# Patient Record
Sex: FEMALE | Race: BLACK OR AFRICAN AMERICAN | NOT HISPANIC OR LATINO | Employment: OTHER | URBAN - METROPOLITAN AREA
[De-identification: names, ages, dates, MRNs, and addresses within clinical notes are randomized per-mention and may not be internally consistent; named-entity substitution may affect disease eponyms.]

---

## 2017-01-01 ENCOUNTER — INFUSION (OUTPATIENT)
Dept: ONCOLOGY | Facility: HOSPITAL | Age: 77
End: 2017-01-01

## 2017-01-01 ENCOUNTER — TELEPHONE (OUTPATIENT)
Dept: ONCOLOGY | Facility: CLINIC | Age: 77
End: 2017-01-01

## 2017-01-01 ENCOUNTER — HOSPITAL ENCOUNTER (OUTPATIENT)
Dept: CARDIOLOGY | Facility: HOSPITAL | Age: 77
Discharge: HOME | End: 2017-08-09
Admitting: NURSE PRACTITIONER

## 2017-01-01 ENCOUNTER — DOCUMENTATION (OUTPATIENT)
Dept: NUTRITION | Facility: HOSPITAL | Age: 77
End: 2017-01-01

## 2017-01-01 ENCOUNTER — OFFICE VISIT (OUTPATIENT)
Dept: ONCOLOGY | Facility: CLINIC | Age: 77
End: 2017-01-01

## 2017-01-01 ENCOUNTER — HOSPITAL ENCOUNTER (OUTPATIENT)
Dept: CT IMAGING | Facility: HOSPITAL | Age: 77
Discharge: HOME | End: 2017-10-20
Attending: INTERNAL MEDICINE | Admitting: INTERNAL MEDICINE

## 2017-01-01 ENCOUNTER — APPOINTMENT (OUTPATIENT)
Dept: ONCOLOGY | Facility: HOSPITAL | Age: 77
End: 2017-01-01

## 2017-01-01 ENCOUNTER — HOSPITAL ENCOUNTER (EMERGENCY)
Facility: HOSPITAL | Age: 77
Discharge: HOME | End: 2017-10-15
Attending: EMERGENCY MEDICINE | Admitting: EMERGENCY MEDICINE

## 2017-01-01 ENCOUNTER — HOSPITAL ENCOUNTER (OUTPATIENT)
Dept: CT IMAGING | Facility: HOSPITAL | Age: 77
Discharge: HOME | End: 2017-05-26
Attending: INTERNAL MEDICINE | Admitting: INTERNAL MEDICINE

## 2017-01-01 ENCOUNTER — HOSPITAL ENCOUNTER (OUTPATIENT)
Dept: CT IMAGING | Facility: HOSPITAL | Age: 77
Discharge: HOME | End: 2017-08-09

## 2017-01-01 ENCOUNTER — APPOINTMENT (OUTPATIENT)
Dept: GENERAL RADIOLOGY | Facility: HOSPITAL | Age: 77
End: 2017-01-01

## 2017-01-01 ENCOUNTER — HOSPITAL ENCOUNTER (OUTPATIENT)
Facility: HOSPITAL | Age: 77
Setting detail: OBSERVATION
Discharge: HOME | End: 2017-07-07
Attending: EMERGENCY MEDICINE | Admitting: INTERNAL MEDICINE

## 2017-01-01 VITALS
WEIGHT: 139 LBS | DIASTOLIC BLOOD PRESSURE: 72 MMHG | HEIGHT: 64 IN | RESPIRATION RATE: 16 BRPM | HEART RATE: 97 BPM | SYSTOLIC BLOOD PRESSURE: 145 MMHG | TEMPERATURE: 97.9 F | BODY MASS INDEX: 23.73 KG/M2

## 2017-01-01 VITALS
RESPIRATION RATE: 18 BRPM | WEIGHT: 171 LBS | TEMPERATURE: 98.8 F | SYSTOLIC BLOOD PRESSURE: 134 MMHG | DIASTOLIC BLOOD PRESSURE: 60 MMHG | BODY MASS INDEX: 29.19 KG/M2 | HEART RATE: 102 BPM | HEIGHT: 64 IN

## 2017-01-01 VITALS
WEIGHT: 153 LBS | TEMPERATURE: 98.2 F | BODY MASS INDEX: 26.12 KG/M2 | DIASTOLIC BLOOD PRESSURE: 63 MMHG | SYSTOLIC BLOOD PRESSURE: 117 MMHG | HEIGHT: 64 IN | RESPIRATION RATE: 18 BRPM | HEART RATE: 106 BPM

## 2017-01-01 VITALS
WEIGHT: 172 LBS | SYSTOLIC BLOOD PRESSURE: 129 MMHG | DIASTOLIC BLOOD PRESSURE: 61 MMHG | TEMPERATURE: 98.2 F | HEART RATE: 112 BPM | BODY MASS INDEX: 29.37 KG/M2 | RESPIRATION RATE: 18 BRPM | HEIGHT: 64 IN

## 2017-01-01 VITALS
TEMPERATURE: 98.1 F | SYSTOLIC BLOOD PRESSURE: 144 MMHG | WEIGHT: 165 LBS | HEIGHT: 64 IN | HEART RATE: 102 BPM | RESPIRATION RATE: 18 BRPM | DIASTOLIC BLOOD PRESSURE: 75 MMHG | BODY MASS INDEX: 28.17 KG/M2

## 2017-01-01 VITALS
BODY MASS INDEX: 24.75 KG/M2 | SYSTOLIC BLOOD PRESSURE: 112 MMHG | RESPIRATION RATE: 16 BRPM | WEIGHT: 145 LBS | HEIGHT: 64 IN | DIASTOLIC BLOOD PRESSURE: 60 MMHG | HEART RATE: 108 BPM | TEMPERATURE: 98 F

## 2017-01-01 VITALS
BODY MASS INDEX: 28.32 KG/M2 | TEMPERATURE: 98.3 F | DIASTOLIC BLOOD PRESSURE: 70 MMHG | WEIGHT: 165 LBS | SYSTOLIC BLOOD PRESSURE: 149 MMHG | RESPIRATION RATE: 19 BRPM | HEART RATE: 88 BPM

## 2017-01-01 VITALS
WEIGHT: 165 LBS | SYSTOLIC BLOOD PRESSURE: 148 MMHG | BODY MASS INDEX: 28.17 KG/M2 | TEMPERATURE: 97.6 F | RESPIRATION RATE: 18 BRPM | HEART RATE: 105 BPM | DIASTOLIC BLOOD PRESSURE: 71 MMHG | HEIGHT: 64 IN

## 2017-01-01 VITALS
RESPIRATION RATE: 18 BRPM | WEIGHT: 173 LBS | TEMPERATURE: 97.8 F | HEART RATE: 110 BPM | SYSTOLIC BLOOD PRESSURE: 125 MMHG | BODY MASS INDEX: 29.53 KG/M2 | HEIGHT: 64 IN | DIASTOLIC BLOOD PRESSURE: 59 MMHG

## 2017-01-01 VITALS
DIASTOLIC BLOOD PRESSURE: 63 MMHG | BODY MASS INDEX: 28 KG/M2 | TEMPERATURE: 97.7 F | HEART RATE: 100 BPM | RESPIRATION RATE: 18 BRPM | WEIGHT: 164 LBS | SYSTOLIC BLOOD PRESSURE: 129 MMHG | HEIGHT: 64 IN

## 2017-01-01 VITALS
HEIGHT: 64 IN | WEIGHT: 146.8 LBS | TEMPERATURE: 98.7 F | DIASTOLIC BLOOD PRESSURE: 65 MMHG | RESPIRATION RATE: 16 BRPM | HEART RATE: 98 BPM | SYSTOLIC BLOOD PRESSURE: 115 MMHG | BODY MASS INDEX: 25.06 KG/M2

## 2017-01-01 VITALS
TEMPERATURE: 97.2 F | BODY MASS INDEX: 29.19 KG/M2 | RESPIRATION RATE: 16 BRPM | SYSTOLIC BLOOD PRESSURE: 135 MMHG | WEIGHT: 171 LBS | HEIGHT: 64 IN | HEART RATE: 104 BPM | DIASTOLIC BLOOD PRESSURE: 57 MMHG

## 2017-01-01 VITALS
SYSTOLIC BLOOD PRESSURE: 144 MMHG | RESPIRATION RATE: 16 BRPM | WEIGHT: 162 LBS | TEMPERATURE: 98.2 F | HEIGHT: 64 IN | DIASTOLIC BLOOD PRESSURE: 68 MMHG | BODY MASS INDEX: 27.66 KG/M2 | HEART RATE: 105 BPM

## 2017-01-01 VITALS
WEIGHT: 140 LBS | HEART RATE: 118 BPM | BODY MASS INDEX: 25.27 KG/M2 | RESPIRATION RATE: 18 BRPM | HEIGHT: 64 IN | HEART RATE: 107 BPM | RESPIRATION RATE: 22 BRPM | SYSTOLIC BLOOD PRESSURE: 133 MMHG | HEIGHT: 64 IN | BODY MASS INDEX: 23.9 KG/M2 | TEMPERATURE: 97.3 F | WEIGHT: 148 LBS | DIASTOLIC BLOOD PRESSURE: 83 MMHG | OXYGEN SATURATION: 95 % | DIASTOLIC BLOOD PRESSURE: 68 MMHG | SYSTOLIC BLOOD PRESSURE: 177 MMHG | TEMPERATURE: 99.1 F

## 2017-01-01 VITALS
DIASTOLIC BLOOD PRESSURE: 69 MMHG | SYSTOLIC BLOOD PRESSURE: 151 MMHG | RESPIRATION RATE: 20 BRPM | WEIGHT: 172 LBS | TEMPERATURE: 97.7 F | HEIGHT: 64 IN | BODY MASS INDEX: 29.37 KG/M2 | HEART RATE: 93 BPM

## 2017-01-01 VITALS
WEIGHT: 136 LBS | BODY MASS INDEX: 23.22 KG/M2 | DIASTOLIC BLOOD PRESSURE: 86 MMHG | RESPIRATION RATE: 16 BRPM | HEIGHT: 64 IN | SYSTOLIC BLOOD PRESSURE: 181 MMHG | HEART RATE: 92 BPM | TEMPERATURE: 97.6 F

## 2017-01-01 VITALS
HEART RATE: 88 BPM | BODY MASS INDEX: 28.32 KG/M2 | WEIGHT: 165 LBS | SYSTOLIC BLOOD PRESSURE: 125 MMHG | RESPIRATION RATE: 16 BRPM | TEMPERATURE: 97 F | DIASTOLIC BLOOD PRESSURE: 70 MMHG

## 2017-01-01 VITALS
WEIGHT: 158 LBS | HEIGHT: 64 IN | SYSTOLIC BLOOD PRESSURE: 173 MMHG | DIASTOLIC BLOOD PRESSURE: 85 MMHG | TEMPERATURE: 97.2 F | HEART RATE: 109 BPM | RESPIRATION RATE: 16 BRPM | BODY MASS INDEX: 26.98 KG/M2

## 2017-01-01 VITALS
HEART RATE: 104 BPM | DIASTOLIC BLOOD PRESSURE: 77 MMHG | TEMPERATURE: 97.7 F | SYSTOLIC BLOOD PRESSURE: 135 MMHG | BODY MASS INDEX: 27.46 KG/M2 | WEIGHT: 160 LBS | RESPIRATION RATE: 15 BRPM

## 2017-01-01 VITALS
HEART RATE: 111 BPM | HEIGHT: 64 IN | OXYGEN SATURATION: 95 % | SYSTOLIC BLOOD PRESSURE: 134 MMHG | TEMPERATURE: 99 F | RESPIRATION RATE: 22 BRPM | WEIGHT: 165 LBS | BODY MASS INDEX: 28.17 KG/M2 | DIASTOLIC BLOOD PRESSURE: 71 MMHG

## 2017-01-01 VITALS
HEIGHT: 64 IN | DIASTOLIC BLOOD PRESSURE: 75 MMHG | HEART RATE: 84 BPM | OXYGEN SATURATION: 88 % | TEMPERATURE: 98.2 F | BODY MASS INDEX: 28.99 KG/M2 | SYSTOLIC BLOOD PRESSURE: 148 MMHG | WEIGHT: 169.8 LBS | RESPIRATION RATE: 16 BRPM

## 2017-01-01 VITALS
HEIGHT: 64 IN | HEART RATE: 115 BPM | SYSTOLIC BLOOD PRESSURE: 127 MMHG | RESPIRATION RATE: 20 BRPM | TEMPERATURE: 97.6 F | BODY MASS INDEX: 29.53 KG/M2 | DIASTOLIC BLOOD PRESSURE: 61 MMHG | WEIGHT: 173 LBS

## 2017-01-01 DIAGNOSIS — C50.111 MALIGNANT NEOPLASM OF CENTRAL PORTION OF RIGHT BREAST IN FEMALE, ESTROGEN RECEPTOR NEGATIVE (HCC): ICD-10-CM

## 2017-01-01 DIAGNOSIS — Z17.1 MALIGNANT NEOPLASM OF CENTRAL PORTION OF RIGHT BREAST IN FEMALE, ESTROGEN RECEPTOR NEGATIVE (HCC): Primary | ICD-10-CM

## 2017-01-01 DIAGNOSIS — R91.8 LUNG NODULES: Primary | ICD-10-CM

## 2017-01-01 DIAGNOSIS — C50.111 MALIGNANT NEOPLASM OF CENTRAL PORTION OF RIGHT BREAST IN FEMALE, ESTROGEN RECEPTOR NEGATIVE (HCC): Primary | ICD-10-CM

## 2017-01-01 DIAGNOSIS — C50.111 MALIGNANT NEOPLASM OF CENTRAL PORTION OF RIGHT FEMALE BREAST, UNSPECIFIED ESTROGEN RECEPTOR STATUS (HCC): Primary | ICD-10-CM

## 2017-01-01 DIAGNOSIS — Z51.11 MAINTENANCE ANTINEOPLASTIC CHEMOTHERAPY: Primary | ICD-10-CM

## 2017-01-01 DIAGNOSIS — Z17.1 MALIGNANT NEOPLASM OF CENTRAL PORTION OF RIGHT BREAST IN FEMALE, ESTROGEN RECEPTOR NEGATIVE (HCC): ICD-10-CM

## 2017-01-01 DIAGNOSIS — R91.8 LUNG NODULES: ICD-10-CM

## 2017-01-01 DIAGNOSIS — C50.111 MALIGNANT NEOPLASM OF CENTRAL PORTION OF RIGHT FEMALE BREAST (HCC): Primary | ICD-10-CM

## 2017-01-01 DIAGNOSIS — C50.111 MALIGNANT NEOPLASM OF CENTRAL PORTION OF RIGHT FEMALE BREAST (HCC): ICD-10-CM

## 2017-01-01 DIAGNOSIS — Z51.11 MAINTENANCE ANTINEOPLASTIC CHEMOTHERAPY: ICD-10-CM

## 2017-01-01 DIAGNOSIS — R09.02 HYPOXIA: ICD-10-CM

## 2017-01-01 DIAGNOSIS — J30.2 ACUTE SEASONAL ALLERGIC RHINITIS DUE TO OTHER ALLERGEN: Primary | ICD-10-CM

## 2017-01-01 DIAGNOSIS — R11.2 NON-INTRACTABLE VOMITING WITH NAUSEA, UNSPECIFIED VOMITING TYPE: Primary | ICD-10-CM

## 2017-01-01 LAB
ALBUMIN SERPL-MCNC: 3.1 G/DL (ref 3.2–4.8)
ALBUMIN SERPL-MCNC: 3.2 G/DL (ref 3.2–4.8)
ALBUMIN SERPL-MCNC: 3.3 G/DL (ref 3.2–4.8)
ALBUMIN SERPL-MCNC: 3.3 G/DL (ref 3.2–4.8)
ALBUMIN SERPL-MCNC: 3.4 G/DL (ref 3.2–4.8)
ALBUMIN SERPL-MCNC: 3.5 G/DL (ref 3.2–4.8)
ALBUMIN SERPL-MCNC: 3.5 G/DL (ref 3.2–4.8)
ALBUMIN SERPL-MCNC: 3.6 G/DL (ref 3.2–4.8)
ALBUMIN SERPL-MCNC: 3.7 G/DL (ref 3.2–4.8)
ALBUMIN/GLOB SERPL: 0.9 G/DL (ref 1.5–2.5)
ALBUMIN/GLOB SERPL: 1 G/DL (ref 1.5–2.5)
ALBUMIN/GLOB SERPL: 1 G/DL (ref 1.5–2.5)
ALBUMIN/GLOB SERPL: 1.1 G/DL (ref 1.5–2.5)
ALBUMIN/GLOB SERPL: 1.2 G/DL (ref 1.5–2.5)
ALBUMIN/GLOB SERPL: 1.3 G/DL (ref 1.5–2.5)
ALBUMIN/GLOB SERPL: 1.4 G/DL (ref 1.5–2.5)
ALP SERPL-CCNC: 102 U/L (ref 25–100)
ALP SERPL-CCNC: 108 U/L (ref 25–100)
ALP SERPL-CCNC: 115 U/L (ref 25–100)
ALP SERPL-CCNC: 124 U/L (ref 25–100)
ALP SERPL-CCNC: 126 U/L (ref 25–100)
ALP SERPL-CCNC: 144 U/L (ref 25–100)
ALP SERPL-CCNC: 178 U/L (ref 25–100)
ALP SERPL-CCNC: 196 U/L (ref 25–100)
ALP SERPL-CCNC: 204 U/L (ref 25–100)
ALP SERPL-CCNC: 208 U/L (ref 25–100)
ALP SERPL-CCNC: 91 U/L (ref 25–100)
ALP SERPL-CCNC: 92 U/L (ref 25–100)
ALP SERPL-CCNC: 94 U/L (ref 25–100)
ALT SERPL W P-5'-P-CCNC: 15 U/L (ref 7–40)
ALT SERPL W P-5'-P-CCNC: 16 U/L (ref 7–40)
ALT SERPL W P-5'-P-CCNC: 19 U/L (ref 7–40)
ALT SERPL W P-5'-P-CCNC: 21 U/L (ref 7–40)
ALT SERPL W P-5'-P-CCNC: 26 U/L (ref 7–40)
ALT SERPL W P-5'-P-CCNC: 27 U/L (ref 7–40)
ALT SERPL W P-5'-P-CCNC: 32 U/L (ref 7–40)
ALT SERPL W P-5'-P-CCNC: 36 U/L (ref 7–40)
ALT SERPL W P-5'-P-CCNC: 37 U/L (ref 7–40)
ALT SERPL W P-5'-P-CCNC: 37 U/L (ref 7–40)
ALT SERPL W P-5'-P-CCNC: 46 U/L (ref 7–40)
ALT SERPL W P-5'-P-CCNC: 47 U/L (ref 7–40)
ALT SERPL W P-5'-P-CCNC: 47 U/L (ref 7–40)
ANION GAP SERPL CALCULATED.3IONS-SCNC: 1 MMOL/L (ref 3–11)
ANION GAP SERPL CALCULATED.3IONS-SCNC: 1 MMOL/L (ref 3–11)
ANION GAP SERPL CALCULATED.3IONS-SCNC: 11 MMOL/L (ref 3–11)
ANION GAP SERPL CALCULATED.3IONS-SCNC: 2 MMOL/L (ref 3–11)
ANION GAP SERPL CALCULATED.3IONS-SCNC: 3 MMOL/L (ref 3–11)
ANION GAP SERPL CALCULATED.3IONS-SCNC: 4 MMOL/L (ref 3–11)
ANION GAP SERPL CALCULATED.3IONS-SCNC: 4 MMOL/L (ref 3–11)
ANION GAP SERPL CALCULATED.3IONS-SCNC: 5 MMOL/L (ref 3–11)
ANION GAP SERPL CALCULATED.3IONS-SCNC: 6 MMOL/L (ref 3–11)
ANION GAP SERPL CALCULATED.3IONS-SCNC: 7 MMOL/L (ref 3–11)
ANION GAP SERPL CALCULATED.3IONS-SCNC: 7 MMOL/L (ref 3–11)
ANION GAP SERPL CALCULATED.3IONS-SCNC: 8 MMOL/L (ref 3–11)
ANION GAP SERPL CALCULATED.3IONS-SCNC: 8 MMOL/L (ref 3–11)
ANION GAP SERPL CALCULATED.3IONS-SCNC: 9 MMOL/L (ref 3–11)
AST SERPL-CCNC: 104 U/L (ref 0–33)
AST SERPL-CCNC: 116 U/L (ref 0–33)
AST SERPL-CCNC: 120 U/L (ref 0–33)
AST SERPL-CCNC: 123 U/L (ref 0–33)
AST SERPL-CCNC: 31 U/L (ref 0–33)
AST SERPL-CCNC: 38 U/L (ref 0–33)
AST SERPL-CCNC: 40 U/L (ref 0–33)
AST SERPL-CCNC: 41 U/L (ref 0–33)
AST SERPL-CCNC: 54 U/L (ref 0–33)
AST SERPL-CCNC: 61 U/L (ref 0–33)
AST SERPL-CCNC: 76 U/L (ref 0–33)
AST SERPL-CCNC: 83 U/L (ref 0–33)
AST SERPL-CCNC: 91 U/L (ref 0–33)
BACTERIA SPEC AEROBE CULT: ABNORMAL
BACTERIA SPEC AEROBE CULT: ABNORMAL
BACTERIA SPEC AEROBE CULT: NORMAL
BACTERIA UR QL AUTO: ABNORMAL /HPF
BASOPHILS # BLD AUTO: 0.03 10*3/MM3 (ref 0–0.2)
BASOPHILS # BLD AUTO: 0.03 10*3/MM3 (ref 0–0.2)
BASOPHILS NFR BLD AUTO: 0.9 % (ref 0–1)
BASOPHILS NFR BLD AUTO: 1.4 % (ref 0–1)
BH CV ECHO MEAS - AO ROOT AREA (BSA CORRECTED): 1.3
BH CV ECHO MEAS - AO ROOT AREA: 4.5 CM^2
BH CV ECHO MEAS - AO ROOT DIAM: 2.4 CM
BH CV ECHO MEAS - BSA(HAYCOCK): 1.9 M^2
BH CV ECHO MEAS - BSA: 1.8 M^2
BH CV ECHO MEAS - BZI_BMI: 28.2 KILOGRAMS/M^2
BH CV ECHO MEAS - BZI_METRIC_HEIGHT: 162.6 CM
BH CV ECHO MEAS - BZI_METRIC_WEIGHT: 74.4 KG
BH CV ECHO MEAS - EDV(CUBED): 78.4 ML
BH CV ECHO MEAS - EDV(TEICH): 82.2 ML
BH CV ECHO MEAS - EF(CUBED): 70.5 %
BH CV ECHO MEAS - EF(TEICH): 62.4 %
BH CV ECHO MEAS - ESV(CUBED): 23.1 ML
BH CV ECHO MEAS - ESV(TEICH): 30.9 ML
BH CV ECHO MEAS - FS: 33.4 %
BH CV ECHO MEAS - IVS/LVPW: 0.96
BH CV ECHO MEAS - IVSD: 1.1 CM
BH CV ECHO MEAS - LA DIMENSION: 3.1 CM
BH CV ECHO MEAS - LA/AO: 1.3
BH CV ECHO MEAS - LV MASS(C)D: 155.5 GRAMS
BH CV ECHO MEAS - LV MASS(C)DI: 86.5 GRAMS/M^2
BH CV ECHO MEAS - LVIDD: 4.3 CM
BH CV ECHO MEAS - LVIDS: 2.9 CM
BH CV ECHO MEAS - LVPWD: 1.1 CM
BH CV ECHO MEAS - MV A MAX VEL: 93.3 CM/SEC
BH CV ECHO MEAS - MV DEC SLOPE: 233 CM/SEC^2
BH CV ECHO MEAS - MV DEC TIME: 0.23 SEC
BH CV ECHO MEAS - MV E MAX VEL: 48.9 CM/SEC
BH CV ECHO MEAS - MV E/A: 0.52
BH CV ECHO MEAS - MV P1/2T MAX VEL: 62.4 CM/SEC
BH CV ECHO MEAS - MV P1/2T: 78.4 MSEC
BH CV ECHO MEAS - MVA P1/2T LCG: 3.5 CM^2
BH CV ECHO MEAS - MVA(P1/2T): 2.8 CM^2
BH CV ECHO MEAS - PA ACC SLOPE: 585 CM/SEC^2
BH CV ECHO MEAS - PA ACC TIME: 0.1 SEC
BH CV ECHO MEAS - PA PR(ACCEL): 34 MMHG
BH CV ECHO MEAS - RAP SYSTOLE: 3 MMHG
BH CV ECHO MEAS - RV MAX PG: 1 MMHG
BH CV ECHO MEAS - RV V1 MAX: 50.3 CM/SEC
BH CV ECHO MEAS - RVSP: 52 MMHG
BH CV ECHO MEAS - SI(CUBED): 30.7 ML/M^2
BH CV ECHO MEAS - SI(TEICH): 28.5 ML/M^2
BH CV ECHO MEAS - SV(CUBED): 55.3 ML
BH CV ECHO MEAS - SV(TEICH): 51.3 ML
BH CV ECHO MEAS - TAPSE (>1.6): 2 CM2
BH CV ECHO MEAS - TR MAX VEL: 351 CM/SEC
BH CV XLRA - RV BASE: 3.5 CM
BH CV XLRA - RV LENGTH: 6.5 CM
BH CV XLRA - RV MID: 2.5 CM
BH CV XLRA - TDI S': 10.4 CM/SEC
BILIRUB SERPL-MCNC: 0.2 MG/DL (ref 0.3–1.2)
BILIRUB SERPL-MCNC: 0.3 MG/DL (ref 0.3–1.2)
BILIRUB SERPL-MCNC: 0.4 MG/DL (ref 0.3–1.2)
BILIRUB UR QL STRIP: NEGATIVE
BUN BLD-MCNC: 11 MG/DL (ref 9–23)
BUN BLD-MCNC: 11 MG/DL (ref 9–23)
BUN BLD-MCNC: 12 MG/DL (ref 9–23)
BUN BLD-MCNC: 12 MG/DL (ref 9–23)
BUN BLD-MCNC: 13 MG/DL (ref 9–23)
BUN BLD-MCNC: 13 MG/DL (ref 9–23)
BUN BLD-MCNC: 14 MG/DL (ref 9–23)
BUN BLD-MCNC: 15 MG/DL (ref 9–23)
BUN BLD-MCNC: 16 MG/DL (ref 9–23)
BUN BLD-MCNC: 18 MG/DL (ref 9–23)
BUN BLD-MCNC: 19 MG/DL (ref 9–23)
BUN BLD-MCNC: 22 MG/DL (ref 9–23)
BUN BLD-MCNC: 7 MG/DL (ref 9–23)
BUN/CREAT SERPL: 10 (ref 7–25)
BUN/CREAT SERPL: 13.3 (ref 7–25)
BUN/CREAT SERPL: 13.6 (ref 7–25)
BUN/CREAT SERPL: 13.8 (ref 7–25)
BUN/CREAT SERPL: 13.8 (ref 7–25)
BUN/CREAT SERPL: 14 (ref 7–25)
BUN/CREAT SERPL: 14.4 (ref 7–25)
BUN/CREAT SERPL: 15 (ref 7–25)
BUN/CREAT SERPL: 15 (ref 7–25)
BUN/CREAT SERPL: 15.6 (ref 7–25)
BUN/CREAT SERPL: 17.5 (ref 7–25)
BUN/CREAT SERPL: 17.8 (ref 7–25)
BUN/CREAT SERPL: 18.6 (ref 7–25)
BUN/CREAT SERPL: 18.8 (ref 7–25)
BUN/CREAT SERPL: 18.8 (ref 7–25)
BUN/CREAT SERPL: 20 (ref 7–25)
BUN/CREAT SERPL: 21.1 (ref 7–25)
BUN/CREAT SERPL: 27.5 (ref 7–25)
CALCIUM SPEC-SCNC: 8.4 MG/DL (ref 8.7–10.4)
CALCIUM SPEC-SCNC: 8.5 MG/DL (ref 8.7–10.4)
CALCIUM SPEC-SCNC: 8.6 MG/DL (ref 8.7–10.4)
CALCIUM SPEC-SCNC: 8.7 MG/DL (ref 8.7–10.4)
CALCIUM SPEC-SCNC: 8.7 MG/DL (ref 8.7–10.4)
CALCIUM SPEC-SCNC: 8.8 MG/DL (ref 8.7–10.4)
CALCIUM SPEC-SCNC: 8.8 MG/DL (ref 8.7–10.4)
CALCIUM SPEC-SCNC: 8.9 MG/DL (ref 8.7–10.4)
CALCIUM SPEC-SCNC: 9 MG/DL (ref 8.7–10.4)
CALCIUM SPEC-SCNC: 9.1 MG/DL (ref 8.7–10.4)
CALCIUM SPEC-SCNC: 9.1 MG/DL (ref 8.7–10.4)
CALCIUM SPEC-SCNC: 9.2 MG/DL (ref 8.7–10.4)
CALCIUM SPEC-SCNC: 9.2 MG/DL (ref 8.7–10.4)
CALCIUM SPEC-SCNC: 9.3 MG/DL (ref 8.7–10.4)
CALCIUM SPEC-SCNC: 9.4 MG/DL (ref 8.7–10.4)
CALCIUM SPEC-SCNC: 9.8 MG/DL (ref 8.7–10.4)
CHLORIDE SERPL-SCNC: 100 MMOL/L (ref 99–109)
CHLORIDE SERPL-SCNC: 100 MMOL/L (ref 99–109)
CHLORIDE SERPL-SCNC: 101 MMOL/L (ref 99–109)
CHLORIDE SERPL-SCNC: 102 MMOL/L (ref 99–109)
CHLORIDE SERPL-SCNC: 102 MMOL/L (ref 99–109)
CHLORIDE SERPL-SCNC: 103 MMOL/L (ref 99–109)
CHLORIDE SERPL-SCNC: 104 MMOL/L (ref 99–109)
CHLORIDE SERPL-SCNC: 104 MMOL/L (ref 99–109)
CHLORIDE SERPL-SCNC: 105 MMOL/L (ref 99–109)
CHLORIDE SERPL-SCNC: 105 MMOL/L (ref 99–109)
CHLORIDE SERPL-SCNC: 106 MMOL/L (ref 99–109)
CHLORIDE SERPL-SCNC: 106 MMOL/L (ref 99–109)
CHLORIDE SERPL-SCNC: 107 MMOL/L (ref 99–109)
CHLORIDE SERPL-SCNC: 107 MMOL/L (ref 99–109)
CHLORIDE SERPL-SCNC: 108 MMOL/L (ref 99–109)
CHLORIDE SERPL-SCNC: 109 MMOL/L (ref 99–109)
CHLORIDE SERPL-SCNC: 109 MMOL/L (ref 99–109)
CHLORIDE SERPL-SCNC: 110 MMOL/L (ref 99–109)
CLARITY UR: CLEAR
CO2 SERPL-SCNC: 25 MMOL/L (ref 20–31)
CO2 SERPL-SCNC: 26 MMOL/L (ref 20–31)
CO2 SERPL-SCNC: 27 MMOL/L (ref 20–31)
CO2 SERPL-SCNC: 28 MMOL/L (ref 20–31)
CO2 SERPL-SCNC: 28 MMOL/L (ref 20–31)
CO2 SERPL-SCNC: 29 MMOL/L (ref 20–31)
CO2 SERPL-SCNC: 30 MMOL/L (ref 20–31)
CO2 SERPL-SCNC: 31 MMOL/L (ref 20–31)
CO2 SERPL-SCNC: 33 MMOL/L (ref 20–31)
CO2 SERPL-SCNC: 34 MMOL/L (ref 20–31)
CO2 SERPL-SCNC: 34 MMOL/L (ref 20–31)
COLOR UR: YELLOW
CREAT BLD-MCNC: 0.7 MG/DL (ref 0.6–1.3)
CREAT BLD-MCNC: 0.7 MG/DL (ref 0.6–1.3)
CREAT BLD-MCNC: 0.8 MG/DL (ref 0.6–1.3)
CREAT BLD-MCNC: 0.9 MG/DL (ref 0.6–1.3)
CREAT BLD-MCNC: 1 MG/DL (ref 0.6–1.3)
CREAT BLD-MCNC: 1 MG/DL (ref 0.6–1.3)
CREAT BLD-MCNC: 1.1 MG/DL (ref 0.6–1.3)
CREAT BLDA-MCNC: 0.9 MG/DL (ref 0.6–1.3)
CREAT BLDA-MCNC: 0.9 MG/DL (ref 0.6–1.3)
CREAT BLDA-MCNC: 1 MG/DL (ref 0.6–1.3)
CREAT BLDA-MCNC: 1.1 MG/DL (ref 0.6–1.3)
CREAT BLDA-MCNC: 1.2 MG/DL (ref 0.6–1.3)
CREAT BLDA-MCNC: 1.2 MG/DL (ref 0.6–1.3)
CREATINE SERPL-MCNC: 1 MG/DL
D-LACTATE SERPL-SCNC: 0.6 MMOL/L (ref 0.5–2)
D-LACTATE SERPL-SCNC: 1.1 MMOL/L (ref 0.5–2)
DEPRECATED RDW RBC AUTO: 53.9 FL (ref 37–54)
DEPRECATED RDW RBC AUTO: 57.3 FL (ref 37–54)
DEPRECATED RDW RBC AUTO: 74.3 FL (ref 37–54)
EOSINOPHIL # BLD AUTO: 0.03 10*3/MM3 (ref 0–0.3)
EOSINOPHIL # BLD AUTO: 0.05 10*3/MM3 (ref 0.1–0.3)
EOSINOPHIL NFR BLD AUTO: 0.9 % (ref 0–3)
EOSINOPHIL NFR BLD AUTO: 2.3 % (ref 0–3)
ERYTHROCYTE [DISTWIDTH] IN BLOOD BY AUTOMATED COUNT: 14.8 % (ref 11.3–14.5)
ERYTHROCYTE [DISTWIDTH] IN BLOOD BY AUTOMATED COUNT: 15.2 % (ref 11.3–14.5)
ERYTHROCYTE [DISTWIDTH] IN BLOOD BY AUTOMATED COUNT: 15.5 % (ref 11.3–14.5)
ERYTHROCYTE [DISTWIDTH] IN BLOOD BY AUTOMATED COUNT: 16.6 % (ref 11.3–14.5)
ERYTHROCYTE [DISTWIDTH] IN BLOOD BY AUTOMATED COUNT: 16.9 % (ref 11.3–14.5)
ERYTHROCYTE [DISTWIDTH] IN BLOOD BY AUTOMATED COUNT: 17.6 % (ref 11.3–14.5)
ERYTHROCYTE [DISTWIDTH] IN BLOOD BY AUTOMATED COUNT: 17.7 % (ref 11.3–14.5)
ERYTHROCYTE [DISTWIDTH] IN BLOOD BY AUTOMATED COUNT: 17.7 % (ref 11.3–14.5)
ERYTHROCYTE [DISTWIDTH] IN BLOOD BY AUTOMATED COUNT: 18.3 % (ref 11.3–14.5)
ERYTHROCYTE [DISTWIDTH] IN BLOOD BY AUTOMATED COUNT: 18.3 % (ref 11.3–14.5)
ERYTHROCYTE [DISTWIDTH] IN BLOOD BY AUTOMATED COUNT: 18.4 % (ref 11.3–14.5)
ERYTHROCYTE [DISTWIDTH] IN BLOOD BY AUTOMATED COUNT: 18.8 % (ref 11.3–14.5)
ERYTHROCYTE [DISTWIDTH] IN BLOOD BY AUTOMATED COUNT: 20 % (ref 11.3–14.5)
ERYTHROCYTE [DISTWIDTH] IN BLOOD BY AUTOMATED COUNT: 20 % (ref 11.3–14.5)
ERYTHROCYTE [DISTWIDTH] IN BLOOD BY AUTOMATED COUNT: 20.5 % (ref 11.3–14.5)
ERYTHROCYTE [DISTWIDTH] IN BLOOD BY AUTOMATED COUNT: 21.3 % (ref 11.3–14.5)
ERYTHROCYTE [DISTWIDTH] IN BLOOD BY AUTOMATED COUNT: 21.9 % (ref 11.3–14.5)
ERYTHROCYTE [DISTWIDTH] IN BLOOD BY AUTOMATED COUNT: 22.9 % (ref 11.3–14.5)
ERYTHROCYTE [DISTWIDTH] IN BLOOD BY AUTOMATED COUNT: 23.1 % (ref 11.3–14.5)
ERYTHROCYTE [DISTWIDTH] IN BLOOD BY AUTOMATED COUNT: 24.1 % (ref 11.3–14.5)
GFR SERPL CREATININE-BSD FRML MDRD: 58 ML/MIN/1.73
GFR SERPL CREATININE-BSD FRML MDRD: 65 ML/MIN/1.73
GFR SERPL CREATININE-BSD FRML MDRD: 65 ML/MIN/1.73
GFR SERPL CREATININE-BSD FRML MDRD: 73 ML/MIN/1.73
GFR SERPL CREATININE-BSD FRML MDRD: 84 ML/MIN/1.73
GFR SERPL CREATININE-BSD FRML MDRD: 98 ML/MIN/1.73
GFR SERPL CREATININE-BSD FRML MDRD: 98 ML/MIN/1.73
GLOBULIN UR ELPH-MCNC: 2.2 GM/DL
GLOBULIN UR ELPH-MCNC: 2.5 GM/DL
GLOBULIN UR ELPH-MCNC: 2.6 GM/DL
GLOBULIN UR ELPH-MCNC: 2.6 GM/DL
GLOBULIN UR ELPH-MCNC: 2.9 GM/DL
GLOBULIN UR ELPH-MCNC: 2.9 GM/DL
GLOBULIN UR ELPH-MCNC: 3.1 GM/DL
GLOBULIN UR ELPH-MCNC: 3.2 GM/DL
GLOBULIN UR ELPH-MCNC: 3.2 GM/DL
GLOBULIN UR ELPH-MCNC: 3.3 GM/DL
GLOBULIN UR ELPH-MCNC: 3.6 GM/DL
GLUCOSE BLD-MCNC: 103 MG/DL (ref 70–100)
GLUCOSE BLD-MCNC: 119 MG/DL (ref 70–100)
GLUCOSE BLD-MCNC: 120 MG/DL (ref 70–100)
GLUCOSE BLD-MCNC: 123 MG/DL (ref 70–100)
GLUCOSE BLD-MCNC: 129 MG/DL (ref 70–100)
GLUCOSE BLD-MCNC: 142 MG/DL (ref 70–100)
GLUCOSE BLD-MCNC: 144 MG/DL (ref 70–100)
GLUCOSE BLD-MCNC: 153 MG/DL (ref 70–100)
GLUCOSE BLD-MCNC: 159 MG/DL (ref 70–100)
GLUCOSE BLD-MCNC: 76 MG/DL (ref 70–100)
GLUCOSE BLD-MCNC: 78 MG/DL (ref 70–100)
GLUCOSE BLD-MCNC: 80 MG/DL (ref 70–100)
GLUCOSE BLD-MCNC: 84 MG/DL (ref 70–100)
GLUCOSE BLD-MCNC: 84 MG/DL (ref 70–100)
GLUCOSE BLD-MCNC: 85 MG/DL (ref 70–100)
GLUCOSE BLD-MCNC: 86 MG/DL (ref 70–100)
GLUCOSE BLD-MCNC: 91 MG/DL (ref 70–100)
GLUCOSE BLD-MCNC: 98 MG/DL (ref 70–100)
GLUCOSE UR STRIP-MCNC: NEGATIVE MG/DL
HCT VFR BLD AUTO: 30.5 % (ref 34.5–44)
HCT VFR BLD AUTO: 31.1 % (ref 34.5–44)
HCT VFR BLD AUTO: 32.9 % (ref 34.5–44)
HCT VFR BLD AUTO: 33 % (ref 34.5–44)
HCT VFR BLD AUTO: 33.2 % (ref 34.5–44)
HCT VFR BLD AUTO: 33.3 % (ref 34.5–44)
HCT VFR BLD AUTO: 33.5 % (ref 34.5–44)
HCT VFR BLD AUTO: 33.7 % (ref 34.5–44)
HCT VFR BLD AUTO: 33.9 % (ref 34.5–44)
HCT VFR BLD AUTO: 33.9 % (ref 34.5–44)
HCT VFR BLD AUTO: 34 % (ref 34.5–44)
HCT VFR BLD AUTO: 34.9 % (ref 34.5–44)
HCT VFR BLD AUTO: 34.9 % (ref 34.5–44)
HCT VFR BLD AUTO: 35.2 % (ref 34.5–44)
HCT VFR BLD AUTO: 35.5 % (ref 34.5–44)
HCT VFR BLD AUTO: 35.7 % (ref 34.5–44)
HCT VFR BLD AUTO: 36.2 % (ref 34.5–44)
HCT VFR BLD AUTO: 36.8 % (ref 34.5–44)
HCT VFR BLD AUTO: 37.4 % (ref 34.5–44)
HCT VFR BLD AUTO: 38.1 % (ref 34.5–44)
HGB BLD-MCNC: 10.2 G/DL (ref 11.5–15.5)
HGB BLD-MCNC: 10.2 G/DL (ref 11.5–15.5)
HGB BLD-MCNC: 10.3 G/DL (ref 11.5–15.5)
HGB BLD-MCNC: 10.3 G/DL (ref 11.5–15.5)
HGB BLD-MCNC: 10.4 G/DL (ref 11.5–15.5)
HGB BLD-MCNC: 10.5 G/DL (ref 11.5–15.5)
HGB BLD-MCNC: 10.5 G/DL (ref 11.5–15.5)
HGB BLD-MCNC: 10.6 G/DL (ref 11.5–15.5)
HGB BLD-MCNC: 10.7 G/DL (ref 11.5–15.5)
HGB BLD-MCNC: 10.8 G/DL (ref 11.5–15.5)
HGB BLD-MCNC: 10.9 G/DL (ref 11.5–15.5)
HGB BLD-MCNC: 10.9 G/DL (ref 11.5–15.5)
HGB BLD-MCNC: 11 G/DL (ref 11.5–15.5)
HGB BLD-MCNC: 11 G/DL (ref 11.5–15.5)
HGB BLD-MCNC: 11.1 G/DL (ref 11.5–15.5)
HGB BLD-MCNC: 11.6 G/DL (ref 11.5–15.5)
HGB BLD-MCNC: 11.8 G/DL (ref 11.5–15.5)
HGB BLD-MCNC: 12 G/DL (ref 11.5–15.5)
HGB BLD-MCNC: 9.4 G/DL (ref 11.5–15.5)
HGB BLD-MCNC: 9.7 G/DL (ref 11.5–15.5)
HGB UR QL STRIP.AUTO: NEGATIVE
HOLD SPECIMEN: NORMAL
HOLD SPECIMEN: NORMAL
HYALINE CASTS UR QL AUTO: ABNORMAL /LPF
IMM GRANULOCYTES # BLD: 0.01 10*3/MM3 (ref 0–0.03)
IMM GRANULOCYTES # BLD: 0.01 10*3/MM3 (ref 0–0.03)
IMM GRANULOCYTES NFR BLD: 0.3 % (ref 0–0.6)
IMM GRANULOCYTES NFR BLD: 0.5 % (ref 0–0.6)
KETONES UR QL STRIP: ABNORMAL
LEFT ATRIUM VOLUME INDEX: 12.8 ML/M2
LEFT ATRIUM VOLUME: 23 CM3
LEUKOCYTE ESTERASE UR QL STRIP.AUTO: ABNORMAL
LIPASE SERPL-CCNC: 28 U/L (ref 6–51)
LYMPHOCYTES # BLD AUTO: 0.61 10*3/MM3 (ref 0.6–4.8)
LYMPHOCYTES # BLD AUTO: 0.86 10*3/MM3 (ref 0.6–4.8)
LYMPHOCYTES # BLD AUTO: 1.2 10*3/MM3 (ref 0.6–4.8)
LYMPHOCYTES # BLD AUTO: 1.3 10*3/MM3 (ref 0.6–4.8)
LYMPHOCYTES # BLD AUTO: 1.3 10*3/MM3 (ref 0.6–4.8)
LYMPHOCYTES # BLD AUTO: 1.4 10*3/MM3 (ref 0.6–4.8)
LYMPHOCYTES # BLD AUTO: 1.4 10*3/MM3 (ref 0.6–4.8)
LYMPHOCYTES # BLD AUTO: 1.5 10*3/MM3 (ref 0.6–4.8)
LYMPHOCYTES # BLD AUTO: 1.6 10*3/MM3 (ref 0.6–4.8)
LYMPHOCYTES # BLD AUTO: 1.7 10*3/MM3 (ref 0.6–4.8)
LYMPHOCYTES # BLD AUTO: 1.8 10*3/MM3 (ref 0.6–4.8)
LYMPHOCYTES # BLD AUTO: 1.9 10*3/MM3 (ref 0.6–4.8)
LYMPHOCYTES # BLD AUTO: 2 10*3/MM3 (ref 0.6–4.8)
LYMPHOCYTES NFR BLD AUTO: 16.8 % (ref 24–44)
LYMPHOCYTES NFR BLD AUTO: 17.6 % (ref 24–44)
LYMPHOCYTES NFR BLD AUTO: 28.3 % (ref 24–44)
LYMPHOCYTES NFR BLD AUTO: 29.3 % (ref 24–44)
LYMPHOCYTES NFR BLD AUTO: 30.5 % (ref 24–44)
LYMPHOCYTES NFR BLD AUTO: 32.3 % (ref 24–44)
LYMPHOCYTES NFR BLD AUTO: 32.9 % (ref 24–44)
LYMPHOCYTES NFR BLD AUTO: 34.5 % (ref 24–44)
LYMPHOCYTES NFR BLD AUTO: 36.8 % (ref 24–44)
LYMPHOCYTES NFR BLD AUTO: 38.8 % (ref 24–44)
LYMPHOCYTES NFR BLD AUTO: 39.1 % (ref 24–44)
LYMPHOCYTES NFR BLD AUTO: 39.9 % (ref 24–44)
LYMPHOCYTES NFR BLD AUTO: 40.9 % (ref 24–44)
LYMPHOCYTES NFR BLD AUTO: 42.1 % (ref 24–44)
LYMPHOCYTES NFR BLD AUTO: 45.7 % (ref 24–44)
LYMPHOCYTES NFR BLD AUTO: 48.8 % (ref 24–44)
LYMPHOCYTES NFR BLD AUTO: 49.2 % (ref 24–44)
LYMPHOCYTES NFR BLD AUTO: 53.7 % (ref 24–44)
LYMPHOCYTES NFR BLD AUTO: 54.9 % (ref 24–44)
MAGNESIUM SERPL-MCNC: 1.5 MG/DL (ref 1.3–2.7)
MAGNESIUM SERPL-MCNC: 1.6 MG/DL (ref 1.3–2.7)
MAGNESIUM SERPL-MCNC: 1.7 MG/DL (ref 1.3–2.7)
MAGNESIUM SERPL-MCNC: 1.7 MG/DL (ref 1.3–2.7)
MAGNESIUM SERPL-MCNC: 1.8 MG/DL (ref 1.3–2.7)
MAGNESIUM SERPL-MCNC: 1.9 MG/DL (ref 1.3–2.7)
MCH RBC QN AUTO: 27.1 PG (ref 27–31)
MCH RBC QN AUTO: 28.1 PG (ref 27–31)
MCH RBC QN AUTO: 28.4 PG (ref 27–31)
MCH RBC QN AUTO: 28.4 PG (ref 27–31)
MCH RBC QN AUTO: 28.7 PG (ref 27–31)
MCH RBC QN AUTO: 29 PG (ref 27–31)
MCH RBC QN AUTO: 29.4 PG (ref 27–31)
MCH RBC QN AUTO: 29.8 PG (ref 27–31)
MCH RBC QN AUTO: 30 PG (ref 27–31)
MCH RBC QN AUTO: 30.1 PG (ref 27–31)
MCH RBC QN AUTO: 30.2 PG (ref 27–31)
MCH RBC QN AUTO: 30.3 PG (ref 27–31)
MCH RBC QN AUTO: 30.4 PG (ref 27–31)
MCH RBC QN AUTO: 30.4 PG (ref 27–31)
MCH RBC QN AUTO: 30.7 PG (ref 27–31)
MCH RBC QN AUTO: 31.1 PG (ref 27–31)
MCH RBC QN AUTO: 31.1 PG (ref 27–31)
MCH RBC QN AUTO: 31.3 PG (ref 27–31)
MCHC RBC AUTO-ENTMCNC: 30 G/DL (ref 32–36)
MCHC RBC AUTO-ENTMCNC: 30.1 G/DL (ref 32–36)
MCHC RBC AUTO-ENTMCNC: 30.3 G/DL (ref 32–36)
MCHC RBC AUTO-ENTMCNC: 30.5 G/DL (ref 32–36)
MCHC RBC AUTO-ENTMCNC: 30.5 G/DL (ref 32–36)
MCHC RBC AUTO-ENTMCNC: 30.8 G/DL (ref 32–36)
MCHC RBC AUTO-ENTMCNC: 30.9 G/DL (ref 32–36)
MCHC RBC AUTO-ENTMCNC: 31.1 G/DL (ref 32–36)
MCHC RBC AUTO-ENTMCNC: 31.4 G/DL (ref 32–36)
MCHC RBC AUTO-ENTMCNC: 31.4 G/DL (ref 32–36)
MCHC RBC AUTO-ENTMCNC: 31.5 G/DL (ref 32–36)
MCHC RBC AUTO-ENTMCNC: 31.6 G/DL (ref 32–36)
MCHC RBC AUTO-ENTMCNC: 32.1 G/DL (ref 32–36)
MCHC RBC AUTO-ENTMCNC: 32.2 G/DL (ref 32–36)
MCHC RBC AUTO-ENTMCNC: 32.4 G/DL (ref 32–36)
MCV RBC AUTO: 100.1 FL (ref 80–99)
MCV RBC AUTO: 100.9 FL (ref 80–99)
MCV RBC AUTO: 103.1 FL (ref 80–99)
MCV RBC AUTO: 89.5 FL (ref 80–99)
MCV RBC AUTO: 91.2 FL (ref 80–99)
MCV RBC AUTO: 91.3 FL (ref 80–99)
MCV RBC AUTO: 91.4 FL (ref 80–99)
MCV RBC AUTO: 92.9 FL (ref 80–99)
MCV RBC AUTO: 93.9 FL (ref 80–99)
MCV RBC AUTO: 94.3 FL (ref 80–99)
MCV RBC AUTO: 94.5 FL (ref 80–99)
MCV RBC AUTO: 94.9 FL (ref 80–99)
MCV RBC AUTO: 95 FL (ref 80–99)
MCV RBC AUTO: 95.5 FL (ref 80–99)
MCV RBC AUTO: 95.5 FL (ref 80–99)
MCV RBC AUTO: 95.7 FL (ref 80–99)
MCV RBC AUTO: 97.1 FL (ref 80–99)
MCV RBC AUTO: 97.9 FL (ref 80–99)
MCV RBC AUTO: 98.4 FL (ref 80–99)
MCV RBC AUTO: 99.2 FL (ref 80–99)
MONOCYTES # BLD AUTO: 0.1 10*3/MM3 (ref 0–1)
MONOCYTES # BLD AUTO: 0.1 10*3/MM3 (ref 0–1)
MONOCYTES # BLD AUTO: 0.2 10*3/MM3 (ref 0–1)
MONOCYTES # BLD AUTO: 0.26 10*3/MM3 (ref 0–1)
MONOCYTES # BLD AUTO: 0.3 10*3/MM3 (ref 0–1)
MONOCYTES # BLD AUTO: 0.4 10*3/MM3 (ref 0–1)
MONOCYTES # BLD AUTO: 0.4 10*3/MM3 (ref 0–1)
MONOCYTES # BLD AUTO: 0.41 10*3/MM3 (ref 0–1)
MONOCYTES # BLD AUTO: 0.5 10*3/MM3 (ref 0–1)
MONOCYTES NFR BLD AUTO: 10.1 % (ref 0–12)
MONOCYTES NFR BLD AUTO: 10.1 % (ref 0–12)
MONOCYTES NFR BLD AUTO: 10.2 % (ref 0–12)
MONOCYTES NFR BLD AUTO: 11.8 % (ref 0–12)
MONOCYTES NFR BLD AUTO: 11.8 % (ref 0–12)
MONOCYTES NFR BLD AUTO: 12 % (ref 0–12)
MONOCYTES NFR BLD AUTO: 12.4 % (ref 0–12)
MONOCYTES NFR BLD AUTO: 12.7 % (ref 0–12)
MONOCYTES NFR BLD AUTO: 13.4 % (ref 0–12)
MONOCYTES NFR BLD AUTO: 2.6 % (ref 0–12)
MONOCYTES NFR BLD AUTO: 2.8 % (ref 0–12)
MONOCYTES NFR BLD AUTO: 2.8 % (ref 0–12)
MONOCYTES NFR BLD AUTO: 4.6 % (ref 0–12)
MONOCYTES NFR BLD AUTO: 4.7 % (ref 0–12)
MONOCYTES NFR BLD AUTO: 5.2 % (ref 0–12)
MONOCYTES NFR BLD AUTO: 5.8 % (ref 0–12)
MONOCYTES NFR BLD AUTO: 6.8 % (ref 0–12)
MONOCYTES NFR BLD AUTO: 7 % (ref 0–12)
MONOCYTES NFR BLD AUTO: 9.2 % (ref 0–12)
NEUTROPHILS # BLD AUTO: 0.7 10*3/MM3 (ref 1.5–8.3)
NEUTROPHILS # BLD AUTO: 0.9 10*3/MM3 (ref 1.5–8.3)
NEUTROPHILS # BLD AUTO: 0.99 10*3/MM3 (ref 1.5–8.3)
NEUTROPHILS # BLD AUTO: 1 10*3/MM3 (ref 1.5–8.3)
NEUTROPHILS # BLD AUTO: 1.2 10*3/MM3 (ref 1.5–8.3)
NEUTROPHILS # BLD AUTO: 1.2 10*3/MM3 (ref 1.5–8.3)
NEUTROPHILS # BLD AUTO: 1.6 10*3/MM3 (ref 1.5–8.3)
NEUTROPHILS # BLD AUTO: 2.2 10*3/MM3 (ref 1.5–8.3)
NEUTROPHILS # BLD AUTO: 2.3 10*3/MM3 (ref 1.5–8.3)
NEUTROPHILS # BLD AUTO: 2.37 10*3/MM3 (ref 1.5–8.3)
NEUTROPHILS # BLD AUTO: 2.4 10*3/MM3 (ref 1.5–8.3)
NEUTROPHILS # BLD AUTO: 2.7 10*3/MM3 (ref 1.5–8.3)
NEUTROPHILS # BLD AUTO: 3.8 10*3/MM3 (ref 1.5–8.3)
NEUTROPHILS # BLD AUTO: 4.7 10*3/MM3 (ref 1.5–8.3)
NEUTROPHILS # BLD AUTO: 8 10*3/MM3 (ref 1.5–8.3)
NEUTROPHILS NFR BLD AUTO: 35 % (ref 41–71)
NEUTROPHILS NFR BLD AUTO: 37.8 % (ref 41–71)
NEUTROPHILS NFR BLD AUTO: 38.8 % (ref 41–71)
NEUTROPHILS NFR BLD AUTO: 39.3 % (ref 41–71)
NEUTROPHILS NFR BLD AUTO: 44.9 % (ref 41–71)
NEUTROPHILS NFR BLD AUTO: 45.1 % (ref 41–71)
NEUTROPHILS NFR BLD AUTO: 47.8 % (ref 41–71)
NEUTROPHILS NFR BLD AUTO: 50.8 % (ref 41–71)
NEUTROPHILS NFR BLD AUTO: 53.3 % (ref 41–71)
NEUTROPHILS NFR BLD AUTO: 54.4 % (ref 41–71)
NEUTROPHILS NFR BLD AUTO: 56.6 % (ref 41–71)
NEUTROPHILS NFR BLD AUTO: 57.3 % (ref 41–71)
NEUTROPHILS NFR BLD AUTO: 60.5 % (ref 41–71)
NEUTROPHILS NFR BLD AUTO: 62.7 % (ref 41–71)
NEUTROPHILS NFR BLD AUTO: 62.7 % (ref 41–71)
NEUTROPHILS NFR BLD AUTO: 63 % (ref 41–71)
NEUTROPHILS NFR BLD AUTO: 66.5 % (ref 41–71)
NEUTROPHILS NFR BLD AUTO: 68.5 % (ref 41–71)
NEUTROPHILS NFR BLD AUTO: 80.6 % (ref 41–71)
NITRITE UR QL STRIP: NEGATIVE
PH UR STRIP.AUTO: 6 [PH] (ref 5–8)
PLATELET # BLD AUTO: 206 10*3/MM3 (ref 150–450)
PLATELET # BLD AUTO: 214 10*3/MM3 (ref 150–450)
PLATELET # BLD AUTO: 220 10*3/MM3 (ref 150–450)
PLATELET # BLD AUTO: 220 10*3/MM3 (ref 150–450)
PLATELET # BLD AUTO: 221 10*3/MM3 (ref 150–450)
PLATELET # BLD AUTO: 231 10*3/MM3 (ref 150–450)
PLATELET # BLD AUTO: 239 10*3/MM3 (ref 150–450)
PLATELET # BLD AUTO: 269 10*3/MM3 (ref 150–450)
PLATELET # BLD AUTO: 270 10*3/MM3 (ref 150–450)
PLATELET # BLD AUTO: 275 10*3/MM3 (ref 150–450)
PLATELET # BLD AUTO: 278 10*3/MM3 (ref 150–450)
PLATELET # BLD AUTO: 279 10*3/MM3 (ref 150–450)
PLATELET # BLD AUTO: 288 10*3/MM3 (ref 150–450)
PLATELET # BLD AUTO: 291 10*3/MM3 (ref 150–450)
PLATELET # BLD AUTO: 295 10*3/MM3 (ref 150–450)
PLATELET # BLD AUTO: 295 10*3/MM3 (ref 150–450)
PLATELET # BLD AUTO: 328 10*3/MM3 (ref 150–450)
PLATELET # BLD AUTO: 332 10*3/MM3 (ref 150–450)
PLATELET # BLD AUTO: 368 10*3/MM3 (ref 150–450)
PLATELET # BLD AUTO: 383 10*3/MM3 (ref 150–450)
PMV BLD AUTO: 10 FL (ref 6–12)
PMV BLD AUTO: 10.4 FL (ref 6–12)
PMV BLD AUTO: 6.9 FL (ref 6–12)
PMV BLD AUTO: 7 FL (ref 6–12)
PMV BLD AUTO: 7 FL (ref 6–12)
PMV BLD AUTO: 7.1 FL (ref 6–12)
PMV BLD AUTO: 7.2 FL (ref 6–12)
PMV BLD AUTO: 7.2 FL (ref 6–12)
PMV BLD AUTO: 7.4 FL (ref 6–12)
PMV BLD AUTO: 7.5 FL (ref 6–12)
PMV BLD AUTO: 7.5 FL (ref 6–12)
PMV BLD AUTO: 7.7 FL (ref 6–12)
PMV BLD AUTO: 9.6 FL (ref 6–12)
POTASSIUM BLD-SCNC: 3.4 MMOL/L (ref 3.5–5.5)
POTASSIUM BLD-SCNC: 3.4 MMOL/L (ref 3.5–5.5)
POTASSIUM BLD-SCNC: 3.5 MMOL/L (ref 3.5–5.5)
POTASSIUM BLD-SCNC: 3.6 MMOL/L (ref 3.5–5.5)
POTASSIUM BLD-SCNC: 3.8 MMOL/L (ref 3.5–5.5)
POTASSIUM BLD-SCNC: 3.9 MMOL/L (ref 3.5–5.5)
POTASSIUM BLD-SCNC: 4 MMOL/L (ref 3.5–5.5)
POTASSIUM BLD-SCNC: 4.1 MMOL/L (ref 3.5–5.5)
POTASSIUM BLD-SCNC: 4.2 MMOL/L (ref 3.5–5.5)
POTASSIUM BLD-SCNC: 4.2 MMOL/L (ref 3.5–5.5)
POTASSIUM BLD-SCNC: 4.3 MMOL/L (ref 3.5–5.5)
PROT SERPL-MCNC: 5.3 G/DL (ref 5.7–8.2)
PROT SERPL-MCNC: 6 G/DL (ref 5.7–8.2)
PROT SERPL-MCNC: 6.1 G/DL (ref 5.7–8.2)
PROT SERPL-MCNC: 6.2 G/DL (ref 5.7–8.2)
PROT SERPL-MCNC: 6.3 G/DL (ref 5.7–8.2)
PROT SERPL-MCNC: 6.5 G/DL (ref 5.7–8.2)
PROT SERPL-MCNC: 6.6 G/DL (ref 5.7–8.2)
PROT SERPL-MCNC: 6.8 G/DL (ref 5.7–8.2)
PROT SERPL-MCNC: 6.8 G/DL (ref 5.7–8.2)
PROT SERPL-MCNC: 6.9 G/DL (ref 5.7–8.2)
PROT SERPL-MCNC: 7 G/DL (ref 5.7–8.2)
PROT UR QL STRIP: ABNORMAL
RBC # BLD AUTO: 3.33 10*6/MM3 (ref 3.89–5.14)
RBC # BLD AUTO: 3.34 10*6/MM3 (ref 3.89–5.14)
RBC # BLD AUTO: 3.41 10*6/MM3 (ref 3.89–5.14)
RBC # BLD AUTO: 3.47 10*6/MM3 (ref 3.89–5.14)
RBC # BLD AUTO: 3.48 10*6/MM3 (ref 3.89–5.14)
RBC # BLD AUTO: 3.49 10*6/MM3 (ref 3.89–5.14)
RBC # BLD AUTO: 3.51 10*6/MM3 (ref 3.89–5.14)
RBC # BLD AUTO: 3.55 10*6/MM3 (ref 3.89–5.14)
RBC # BLD AUTO: 3.55 10*6/MM3 (ref 3.89–5.14)
RBC # BLD AUTO: 3.57 10*6/MM3 (ref 3.89–5.14)
RBC # BLD AUTO: 3.6 10*6/MM3 (ref 3.89–5.14)
RBC # BLD AUTO: 3.61 10*6/MM3 (ref 3.89–5.14)
RBC # BLD AUTO: 3.61 10*6/MM3 (ref 3.89–5.14)
RBC # BLD AUTO: 3.64 10*6/MM3 (ref 3.89–5.14)
RBC # BLD AUTO: 3.66 10*6/MM3 (ref 3.89–5.14)
RBC # BLD AUTO: 3.68 10*6/MM3 (ref 3.89–5.14)
RBC # BLD AUTO: 3.71 10*6/MM3 (ref 3.89–5.14)
RBC # BLD AUTO: 3.89 10*6/MM3 (ref 3.89–5.14)
RBC # BLD AUTO: 3.92 10*6/MM3 (ref 3.89–5.14)
RBC # BLD AUTO: 3.94 10*6/MM3 (ref 3.89–5.14)
RBC # UR: ABNORMAL /HPF
REF LAB TEST METHOD: ABNORMAL
SODIUM BLD-SCNC: 136 MMOL/L (ref 132–146)
SODIUM BLD-SCNC: 136 MMOL/L (ref 132–146)
SODIUM BLD-SCNC: 137 MMOL/L (ref 132–146)
SODIUM BLD-SCNC: 138 MMOL/L (ref 132–146)
SODIUM BLD-SCNC: 138 MMOL/L (ref 132–146)
SODIUM BLD-SCNC: 139 MMOL/L (ref 132–146)
SODIUM BLD-SCNC: 141 MMOL/L (ref 132–146)
SODIUM BLD-SCNC: 141 MMOL/L (ref 132–146)
SODIUM BLD-SCNC: 142 MMOL/L (ref 132–146)
SODIUM BLD-SCNC: 143 MMOL/L (ref 132–146)
SODIUM BLD-SCNC: 144 MMOL/L (ref 132–146)
SODIUM BLD-SCNC: 145 MMOL/L (ref 132–146)
SP GR UR STRIP: 1.02 (ref 1–1.03)
SQUAMOUS #/AREA URNS HPF: ABNORMAL /HPF
STREP GROUPING: ABNORMAL
TROPONIN I SERPL-MCNC: 0.03 NG/ML (ref 0–0.07)
TROPONIN I SERPL-MCNC: 0.03 NG/ML (ref 0–0.07)
UROBILINOGEN UR QL STRIP: ABNORMAL
WBC NRBC COR # BLD: 2.1 10*3/MM3 (ref 3.5–10.8)
WBC NRBC COR # BLD: 2.2 10*3/MM3 (ref 3.5–10.8)
WBC NRBC COR # BLD: 2.5 10*3/MM3 (ref 3.5–10.8)
WBC NRBC COR # BLD: 2.5 10*3/MM3 (ref 3.5–10.8)
WBC NRBC COR # BLD: 2.6 10*3/MM3 (ref 3.5–10.8)
WBC NRBC COR # BLD: 3.1 10*3/MM3 (ref 3.5–10.8)
WBC NRBC COR # BLD: 3.4 10*3/MM3 (ref 3.5–10.8)
WBC NRBC COR # BLD: 3.46 10*3/MM3 (ref 3.5–10.8)
WBC NRBC COR # BLD: 3.49 10*3/MM3 (ref 3.5–10.8)
WBC NRBC COR # BLD: 3.6 10*3/MM3 (ref 3.5–10.8)
WBC NRBC COR # BLD: 3.8 10*3/MM3 (ref 3.5–10.8)
WBC NRBC COR # BLD: 4.3 10*3/MM3 (ref 3.5–10.8)
WBC NRBC COR # BLD: 4.3 10*3/MM3 (ref 3.5–10.8)
WBC NRBC COR # BLD: 4.4 10*3/MM3 (ref 3.5–10.8)
WBC NRBC COR # BLD: 4.5 10*3/MM3 (ref 3.5–10.8)
WBC NRBC COR # BLD: 6 10*3/MM3 (ref 3.5–10.8)
WBC NRBC COR # BLD: 7 10*3/MM3 (ref 3.5–10.8)
WBC NRBC COR # BLD: 9.9 10*3/MM3 (ref 3.5–10.8)
WBC UR QL AUTO: ABNORMAL /HPF
WHOLE BLOOD HOLD SPECIMEN: NORMAL
WHOLE BLOOD HOLD SPECIMEN: NORMAL

## 2017-01-01 PROCEDURE — 96409 CHEMO IV PUSH SNGL DRUG: CPT

## 2017-01-01 PROCEDURE — 96372 THER/PROPH/DIAG INJ SC/IM: CPT

## 2017-01-01 PROCEDURE — 96413 CHEMO IV INFUSION 1 HR: CPT

## 2017-01-01 PROCEDURE — 83690 ASSAY OF LIPASE: CPT | Performed by: EMERGENCY MEDICINE

## 2017-01-01 PROCEDURE — 25010000002 DEXAMETHASONE PER 1 MG: Performed by: INTERNAL MEDICINE

## 2017-01-01 PROCEDURE — 80048 BASIC METABOLIC PNL TOTAL CA: CPT

## 2017-01-01 PROCEDURE — 25010000002 PALONOSETRON PER 25 MCG: Performed by: INTERNAL MEDICINE

## 2017-01-01 PROCEDURE — 96375 TX/PRO/DX INJ NEW DRUG ADDON: CPT

## 2017-01-01 PROCEDURE — 25010000003 HEPARIN LOCK FLUCH PER 10 UNITS: Performed by: RADIOLOGY

## 2017-01-01 PROCEDURE — 25010000002 DOXORUBICIN LIPOSOMAL PER 10 MG: Performed by: NURSE PRACTITIONER

## 2017-01-01 PROCEDURE — 25810000003 SODIUM CHLORIDE 0.9 % WITH KCL 20 MEQ 20-0.9 MEQ/L-% SOLUTION: Performed by: INTERNAL MEDICINE

## 2017-01-01 PROCEDURE — 85025 COMPLETE CBC W/AUTO DIFF WBC: CPT

## 2017-01-01 PROCEDURE — 25010000002 ENOXAPARIN PER 10 MG: Performed by: INTERNAL MEDICINE

## 2017-01-01 PROCEDURE — 99215 OFFICE O/P EST HI 40 MIN: CPT | Performed by: INTERNAL MEDICINE

## 2017-01-01 PROCEDURE — G0378 HOSPITAL OBSERVATION PER HR: HCPCS

## 2017-01-01 PROCEDURE — 82565 ASSAY OF CREATININE: CPT

## 2017-01-01 PROCEDURE — 83735 ASSAY OF MAGNESIUM: CPT

## 2017-01-01 PROCEDURE — 25010000002 DOXORUBICIN PER 10 MG: Performed by: INTERNAL MEDICINE

## 2017-01-01 PROCEDURE — 99214 OFFICE O/P EST MOD 30 MIN: CPT | Performed by: NURSE PRACTITIONER

## 2017-01-01 PROCEDURE — 25010000002 HEPARIN FLUSH (PORCINE) 100 UNIT/ML SOLUTION: Performed by: INTERNAL MEDICINE

## 2017-01-01 PROCEDURE — 25010000002 ERIBULIN 1 MG/2ML SOLUTION: Performed by: INTERNAL MEDICINE

## 2017-01-01 PROCEDURE — 80053 COMPREHEN METABOLIC PANEL: CPT

## 2017-01-01 PROCEDURE — 25010000002 ALTEPLASE 2 MG RECONSTITUTED SOLUTION 1 EACH VIAL: Performed by: INTERNAL MEDICINE

## 2017-01-01 PROCEDURE — 84484 ASSAY OF TROPONIN QUANT: CPT

## 2017-01-01 PROCEDURE — 71010 HC CHEST PA OR AP: CPT

## 2017-01-01 PROCEDURE — 83605 ASSAY OF LACTIC ACID: CPT | Performed by: EMERGENCY MEDICINE

## 2017-01-01 PROCEDURE — 96367 TX/PROPH/DG ADDL SEQ IV INF: CPT

## 2017-01-01 PROCEDURE — 85025 COMPLETE CBC W/AUTO DIFF WBC: CPT | Performed by: INTERNAL MEDICINE

## 2017-01-01 PROCEDURE — 25010000002 PROMETHAZINE PER 50 MG: Performed by: EMERGENCY MEDICINE

## 2017-01-01 PROCEDURE — 87040 BLOOD CULTURE FOR BACTERIA: CPT | Performed by: EMERGENCY MEDICINE

## 2017-01-01 PROCEDURE — 93306 TTE W/DOPPLER COMPLETE: CPT | Performed by: INTERNAL MEDICINE

## 2017-01-01 PROCEDURE — 25010000002 DIPHENHYDRAMINE PER 50 MG: Performed by: NURSE PRACTITIONER

## 2017-01-01 PROCEDURE — 25010000002 ERIBULIN 1 MG/2ML SOLUTION: Performed by: NURSE PRACTITIONER

## 2017-01-01 PROCEDURE — 99225 PR SBSQ OBSERVATION CARE/DAY 25 MINUTES: CPT | Performed by: INTERNAL MEDICINE

## 2017-01-01 PROCEDURE — 99220 PR INITIAL OBSERVATION CARE/DAY 70 MINUTES: CPT | Performed by: INTERNAL MEDICINE

## 2017-01-01 PROCEDURE — 85027 COMPLETE CBC AUTOMATED: CPT | Performed by: INTERNAL MEDICINE

## 2017-01-01 PROCEDURE — 99214 OFFICE O/P EST MOD 30 MIN: CPT | Performed by: INTERNAL MEDICINE

## 2017-01-01 PROCEDURE — 96411 CHEMO IV PUSH ADDL DRUG: CPT

## 2017-01-01 PROCEDURE — 25010000002 DEXAMETHASONE PER 1 MG: Performed by: NURSE PRACTITIONER

## 2017-01-01 PROCEDURE — 36591 DRAW BLOOD OFF VENOUS DEVICE: CPT

## 2017-01-01 PROCEDURE — 25010000002 ONDANSETRON PER 1 MG: Performed by: EMERGENCY MEDICINE

## 2017-01-01 PROCEDURE — 74177 CT ABD & PELVIS W/CONTRAST: CPT

## 2017-01-01 PROCEDURE — 71260 CT THORAX DX C+: CPT

## 2017-01-01 PROCEDURE — 87086 URINE CULTURE/COLONY COUNT: CPT | Performed by: EMERGENCY MEDICINE

## 2017-01-01 PROCEDURE — 96365 THER/PROPH/DIAG IV INF INIT: CPT

## 2017-01-01 PROCEDURE — 0 IOPAMIDOL 61 % SOLUTION: Performed by: INTERNAL MEDICINE

## 2017-01-01 PROCEDURE — 25010000003 HEPARIN LOCK FLUCH PER 10 UNITS: Performed by: INTERNAL MEDICINE

## 2017-01-01 PROCEDURE — 99284 EMERGENCY DEPT VISIT MOD MDM: CPT

## 2017-01-01 PROCEDURE — 80053 COMPREHEN METABOLIC PANEL: CPT | Performed by: EMERGENCY MEDICINE

## 2017-01-01 PROCEDURE — 93005 ELECTROCARDIOGRAM TRACING: CPT | Performed by: EMERGENCY MEDICINE

## 2017-01-01 PROCEDURE — 80053 COMPREHEN METABOLIC PANEL: CPT | Performed by: INTERNAL MEDICINE

## 2017-01-01 PROCEDURE — 81001 URINALYSIS AUTO W/SCOPE: CPT | Performed by: EMERGENCY MEDICINE

## 2017-01-01 PROCEDURE — 36415 COLL VENOUS BLD VENIPUNCTURE: CPT

## 2017-01-01 PROCEDURE — 36593 DECLOT VASCULAR DEVICE: CPT

## 2017-01-01 PROCEDURE — 87147 CULTURE TYPE IMMUNOLOGIC: CPT | Performed by: EMERGENCY MEDICINE

## 2017-01-01 PROCEDURE — 96374 THER/PROPH/DIAG INJ IV PUSH: CPT

## 2017-01-01 PROCEDURE — 96361 HYDRATE IV INFUSION ADD-ON: CPT

## 2017-01-01 PROCEDURE — 93005 ELECTROCARDIOGRAM TRACING: CPT

## 2017-01-01 PROCEDURE — 99282 EMERGENCY DEPT VISIT SF MDM: CPT

## 2017-01-01 PROCEDURE — 93306 TTE W/DOPPLER COMPLETE: CPT

## 2017-01-01 PROCEDURE — 83605 ASSAY OF LACTIC ACID: CPT | Performed by: INTERNAL MEDICINE

## 2017-01-01 PROCEDURE — 96376 TX/PRO/DX INJ SAME DRUG ADON: CPT

## 2017-01-01 PROCEDURE — 0 IOPAMIDOL 61 % SOLUTION: Performed by: NURSE PRACTITIONER

## 2017-01-01 PROCEDURE — 99217 PR OBSERVATION CARE DISCHARGE MANAGEMENT: CPT | Performed by: INTERNAL MEDICINE

## 2017-01-01 PROCEDURE — 85025 COMPLETE CBC W/AUTO DIFF WBC: CPT | Performed by: EMERGENCY MEDICINE

## 2017-01-01 RX ORDER — SODIUM CHLORIDE 9 MG/ML
250 INJECTION, SOLUTION INTRAVENOUS ONCE
Status: COMPLETED | OUTPATIENT
Start: 2017-01-01 | End: 2017-01-01

## 2017-01-01 RX ORDER — SODIUM CHLORIDE 0.9 % (FLUSH) 0.9 %
10 SYRINGE (ML) INJECTION AS NEEDED
Status: CANCELLED | OUTPATIENT
Start: 2017-01-01

## 2017-01-01 RX ORDER — SODIUM CHLORIDE AND POTASSIUM CHLORIDE 150; 900 MG/100ML; MG/100ML
100 INJECTION, SOLUTION INTRAVENOUS CONTINUOUS
Status: DISCONTINUED | OUTPATIENT
Start: 2017-01-01 | End: 2017-01-01 | Stop reason: HOSPADM

## 2017-01-01 RX ORDER — HYDROCODONE BITARTRATE AND ACETAMINOPHEN 5; 325 MG/1; MG/1
1 TABLET ORAL EVERY 6 HOURS PRN
Qty: 120 TABLET | Refills: 0 | Status: SHIPPED | OUTPATIENT
Start: 2017-01-01 | End: 2017-01-01 | Stop reason: SDUPTHER

## 2017-01-01 RX ORDER — SODIUM CHLORIDE 9 MG/ML
250 INJECTION, SOLUTION INTRAVENOUS ONCE
Status: CANCELLED | OUTPATIENT
Start: 2017-01-01

## 2017-01-01 RX ORDER — DEXTROSE MONOHYDRATE 50 MG/ML
250 INJECTION, SOLUTION INTRAVENOUS ONCE
Status: CANCELLED | OUTPATIENT
Start: 2017-01-01

## 2017-01-01 RX ORDER — FLUTICASONE PROPIONATE 50 MCG
1 SPRAY, SUSPENSION (ML) NASAL DAILY
Qty: 15.8 ML | Refills: 0 | Status: ON HOLD | OUTPATIENT
Start: 2017-01-01 | End: 2018-01-01

## 2017-01-01 RX ORDER — DOXORUBICIN HYDROCHLORIDE 2 MG/ML
20 INJECTION, SOLUTION INTRAVENOUS ONCE
Status: COMPLETED | OUTPATIENT
Start: 2017-01-01 | End: 2017-01-01

## 2017-01-01 RX ORDER — SODIUM CHLORIDE 9 MG/ML
250 INJECTION, SOLUTION INTRAVENOUS ONCE
Status: CANCELLED | OUTPATIENT
Start: 2018-01-01

## 2017-01-01 RX ORDER — SODIUM CHLORIDE 0.9 % (FLUSH) 0.9 %
1-10 SYRINGE (ML) INJECTION AS NEEDED
Status: DISCONTINUED | OUTPATIENT
Start: 2017-01-01 | End: 2017-01-01 | Stop reason: HOSPADM

## 2017-01-01 RX ORDER — PALONOSETRON 0.05 MG/ML
0.25 INJECTION, SOLUTION INTRAVENOUS ONCE
Status: COMPLETED | OUTPATIENT
Start: 2017-01-01 | End: 2017-01-01

## 2017-01-01 RX ORDER — ERYTHROMYCIN 250 MG/1
250 TABLET, COATED ORAL 4 TIMES DAILY
COMMUNITY
End: 2017-01-01

## 2017-01-01 RX ORDER — HEPARIN SODIUM (PORCINE) LOCK FLUSH IV SOLN 100 UNIT/ML 100 UNIT/ML
500 SOLUTION INTRAVENOUS AS NEEDED
Status: DISCONTINUED | OUTPATIENT
Start: 2017-01-01 | End: 2017-01-01 | Stop reason: HOSPADM

## 2017-01-01 RX ORDER — HYDRALAZINE HYDROCHLORIDE 25 MG/1
25 TABLET, FILM COATED ORAL NIGHTLY PRN
COMMUNITY
End: 2017-01-01

## 2017-01-01 RX ORDER — DEXTROSE MONOHYDRATE 50 MG/ML
250 INJECTION, SOLUTION INTRAVENOUS ONCE
Status: COMPLETED | OUTPATIENT
Start: 2017-01-01 | End: 2017-01-01

## 2017-01-01 RX ORDER — QUINAPRIL 5 1/1
10 TABLET ORAL DAILY
Status: DISCONTINUED | OUTPATIENT
Start: 2017-01-01 | End: 2017-01-01 | Stop reason: HOSPADM

## 2017-01-01 RX ORDER — AMLODIPINE BESYLATE 10 MG/1
10 TABLET ORAL DAILY
Status: DISCONTINUED | OUTPATIENT
Start: 2017-01-01 | End: 2017-01-01 | Stop reason: HOSPADM

## 2017-01-01 RX ORDER — SODIUM CHLORIDE 9 MG/ML
250 INJECTION, SOLUTION INTRAVENOUS ONCE
Status: DISCONTINUED | OUTPATIENT
Start: 2017-01-01 | End: 2017-01-01 | Stop reason: HOSPADM

## 2017-01-01 RX ORDER — CETIRIZINE HYDROCHLORIDE 5 MG/1
5 TABLET, CHEWABLE ORAL DAILY
Qty: 30 TABLET | Refills: 0 | Status: SHIPPED | OUTPATIENT
Start: 2017-01-01 | End: 2017-01-01

## 2017-01-01 RX ORDER — DOXORUBICIN HYDROCHLORIDE 2 MG/ML
20 INJECTION, SOLUTION INTRAVENOUS ONCE
Status: CANCELLED | OUTPATIENT
Start: 2017-01-01

## 2017-01-01 RX ORDER — SODIUM CHLORIDE 9 MG/ML
100 INJECTION, SOLUTION INTRAVENOUS CONTINUOUS
Status: DISCONTINUED | OUTPATIENT
Start: 2017-01-01 | End: 2017-01-01

## 2017-01-01 RX ORDER — POLYETHYLENE GLYCOL 3350 17 G/17G
17 POWDER, FOR SOLUTION ORAL DAILY
Qty: 225 G | Refills: 1 | Status: SHIPPED | OUTPATIENT
Start: 2017-01-01 | End: 2018-01-01

## 2017-01-01 RX ORDER — ONDANSETRON 2 MG/ML
4 INJECTION INTRAMUSCULAR; INTRAVENOUS EVERY 6 HOURS PRN
Status: DISCONTINUED | OUTPATIENT
Start: 2017-01-01 | End: 2017-01-01 | Stop reason: HOSPADM

## 2017-01-01 RX ORDER — SODIUM CHLORIDE 0.9 % (FLUSH) 0.9 %
10 SYRINGE (ML) INJECTION AS NEEDED
Status: DISCONTINUED | OUTPATIENT
Start: 2017-01-01 | End: 2017-01-01 | Stop reason: HOSPADM

## 2017-01-01 RX ORDER — PROMETHAZINE HYDROCHLORIDE 25 MG/ML
6.25 INJECTION, SOLUTION INTRAMUSCULAR; INTRAVENOUS ONCE
Status: COMPLETED | OUTPATIENT
Start: 2017-01-01 | End: 2017-01-01

## 2017-01-01 RX ORDER — MEGESTROL ACETATE 40 MG/ML
800 SUSPENSION ORAL DAILY
Qty: 480 ML | Refills: 5 | Status: SHIPPED | OUTPATIENT
Start: 2017-01-01 | End: 2018-01-01

## 2017-01-01 RX ORDER — PALONOSETRON 0.05 MG/ML
0.25 INJECTION, SOLUTION INTRAVENOUS ONCE
Status: CANCELLED | OUTPATIENT
Start: 2017-01-01

## 2017-01-01 RX ORDER — DEXTROMETHORPHAN HYDROBROMIDE AND PROMETHAZINE HYDROCHLORIDE 15; 6.25 MG/5ML; MG/5ML
SYRUP ORAL
Refills: 0 | COMMUNITY
Start: 2017-01-01 | End: 2017-01-01

## 2017-01-01 RX ORDER — ONDANSETRON 4 MG/1
TABLET, ORALLY DISINTEGRATING ORAL
Refills: 0 | COMMUNITY
Start: 2017-01-01 | End: 2018-01-01

## 2017-01-01 RX ORDER — DOXORUBICIN HYDROCHLORIDE 2 MG/ML
10 INJECTION, SOLUTION INTRAVENOUS ONCE
Status: CANCELLED | OUTPATIENT
Start: 2017-01-01

## 2017-01-01 RX ORDER — PROMETHAZINE HYDROCHLORIDE 25 MG/1
TABLET ORAL
Refills: 0 | COMMUNITY
Start: 2017-01-01 | End: 2018-01-01

## 2017-01-01 RX ORDER — ONDANSETRON 2 MG/ML
4 INJECTION INTRAMUSCULAR; INTRAVENOUS ONCE
Status: COMPLETED | OUTPATIENT
Start: 2017-01-01 | End: 2017-01-01

## 2017-01-01 RX ORDER — HYDRALAZINE HYDROCHLORIDE 25 MG/1
25 TABLET, FILM COATED ORAL NIGHTLY PRN
Status: DISCONTINUED | OUTPATIENT
Start: 2017-01-01 | End: 2017-01-01 | Stop reason: HOSPADM

## 2017-01-01 RX ORDER — QUINAPRIL 5 1/1
10 TABLET ORAL DAILY
Refills: 0 | COMMUNITY
Start: 2017-01-01 | End: 2018-01-01 | Stop reason: SDUPTHER

## 2017-01-01 RX ORDER — HYDROCODONE BITARTRATE AND ACETAMINOPHEN 5; 325 MG/1; MG/1
1 TABLET ORAL EVERY 6 HOURS PRN
Qty: 120 TABLET | Refills: 0 | Status: SHIPPED | OUTPATIENT
Start: 2017-01-01 | End: 2018-01-01 | Stop reason: SDUPTHER

## 2017-01-01 RX ORDER — ONDANSETRON HYDROCHLORIDE 8 MG/1
8 TABLET, FILM COATED ORAL 3 TIMES DAILY PRN
Qty: 30 TABLET | Refills: 5 | Status: SHIPPED | OUTPATIENT
Start: 2017-01-01 | End: 2018-01-01

## 2017-01-01 RX ORDER — DOXORUBICIN HYDROCHLORIDE 2 MG/ML
20 INJECTION, SOLUTION INTRAVENOUS ONCE
Status: DISCONTINUED | OUTPATIENT
Start: 2017-01-01 | End: 2017-01-01

## 2017-01-01 RX ORDER — DOXORUBICIN HYDROCHLORIDE 2 MG/ML
10 INJECTION, SOLUTION INTRAVENOUS ONCE
Status: COMPLETED | OUTPATIENT
Start: 2017-01-01 | End: 2017-01-01

## 2017-01-01 RX ORDER — LACTULOSE 10 G/15ML
10 SOLUTION ORAL DAILY
Status: DISCONTINUED | OUTPATIENT
Start: 2017-01-01 | End: 2017-01-01 | Stop reason: HOSPADM

## 2017-01-01 RX ORDER — LORATADINE 10 MG/1
10 TABLET ORAL DAILY
Status: ON HOLD | COMMUNITY
End: 2017-01-01

## 2017-01-01 RX ORDER — PROMETHAZINE HYDROCHLORIDE 25 MG/ML
12.5 INJECTION, SOLUTION INTRAMUSCULAR; INTRAVENOUS ONCE
Status: DISCONTINUED | OUTPATIENT
Start: 2017-01-01 | End: 2017-01-01

## 2017-01-01 RX ORDER — HYDRALAZINE HYDROCHLORIDE 50 MG/1
50 TABLET, FILM COATED ORAL DAILY
Status: DISCONTINUED | OUTPATIENT
Start: 2017-01-01 | End: 2017-01-01 | Stop reason: HOSPADM

## 2017-01-01 RX ORDER — DOCUSATE SODIUM 100 MG/1
100 CAPSULE, LIQUID FILLED ORAL 2 TIMES DAILY
Status: DISCONTINUED | OUTPATIENT
Start: 2017-01-01 | End: 2017-01-01 | Stop reason: HOSPADM

## 2017-01-01 RX ORDER — POLYETHYLENE GLYCOL 3350 17 G/17G
17 POWDER, FOR SOLUTION ORAL DAILY
Status: DISCONTINUED | OUTPATIENT
Start: 2017-01-01 | End: 2017-01-01 | Stop reason: HOSPADM

## 2017-01-01 RX ORDER — HEPARIN SODIUM (PORCINE) LOCK FLUSH IV SOLN 100 UNIT/ML 100 UNIT/ML
500 SOLUTION INTRAVENOUS ONCE
Status: COMPLETED | OUTPATIENT
Start: 2017-01-01 | End: 2017-01-01

## 2017-01-01 RX ADMIN — POTASSIUM CHLORIDE AND SODIUM CHLORIDE 100 ML/HR: 900; 150 INJECTION, SOLUTION INTRAVENOUS at 06:17

## 2017-01-01 RX ADMIN — DEXAMETHASONE SODIUM PHOSPHATE 12 MG: 4 INJECTION, SOLUTION INTRAMUSCULAR; INTRAVENOUS at 15:27

## 2017-01-01 RX ADMIN — PALONOSETRON HYDROCHLORIDE 0.25 MG: 0.25 INJECTION INTRAVENOUS at 13:59

## 2017-01-01 RX ADMIN — POLYETHYLENE GLYCOL 3350 17 G: 17 POWDER, FOR SOLUTION ORAL at 08:20

## 2017-01-01 RX ADMIN — DOCUSATE SODIUM 100 MG: 100 CAPSULE, LIQUID FILLED ORAL at 12:15

## 2017-01-01 RX ADMIN — DIPHENHYDRAMINE HYDROCHLORIDE 50 MG: 50 INJECTION INTRAMUSCULAR; INTRAVENOUS at 14:21

## 2017-01-01 RX ADMIN — HEPARIN 500 UNITS: 100 SYRINGE at 13:13

## 2017-01-01 RX ADMIN — HEPARIN 500 UNITS: 100 SYRINGE at 15:45

## 2017-01-01 RX ADMIN — POTASSIUM CHLORIDE AND SODIUM CHLORIDE 100 ML/HR: 900; 150 INJECTION, SOLUTION INTRAVENOUS at 23:53

## 2017-01-01 RX ADMIN — ERIBULIN MESYLATE 2.52 MG: 0.5 INJECTION INTRAVENOUS at 14:57

## 2017-01-01 RX ADMIN — DEXAMETHASONE SODIUM PHOSPHATE 12 MG: 4 INJECTION, SOLUTION INTRAMUSCULAR; INTRAVENOUS at 14:41

## 2017-01-01 RX ADMIN — HEPARIN 500 UNITS: 100 SYRINGE at 15:12

## 2017-01-01 RX ADMIN — Medication 10 ML: at 14:31

## 2017-01-01 RX ADMIN — SODIUM CHLORIDE 250 ML: 9 INJECTION, SOLUTION INTRAVENOUS at 15:50

## 2017-01-01 RX ADMIN — Medication 10 ML: at 15:31

## 2017-01-01 RX ADMIN — DIPHENHYDRAMINE HYDROCHLORIDE 50 MG: 50 INJECTION INTRAMUSCULAR; INTRAVENOUS at 15:36

## 2017-01-01 RX ADMIN — DEXAMETHASONE SODIUM PHOSPHATE 12 MG: 4 INJECTION, SOLUTION INTRAMUSCULAR; INTRAVENOUS at 14:21

## 2017-01-01 RX ADMIN — DEXAMETHASONE SODIUM PHOSPHATE 12 MG: 4 INJECTION, SOLUTION INTRAMUSCULAR; INTRAVENOUS at 14:47

## 2017-01-01 RX ADMIN — PALONOSETRON HYDROCHLORIDE 0.25 MG: 0.25 INJECTION INTRAVENOUS at 16:06

## 2017-01-01 RX ADMIN — ENOXAPARIN SODIUM 40 MG: 40 INJECTION SUBCUTANEOUS at 09:17

## 2017-01-01 RX ADMIN — DEXAMETHASONE SODIUM PHOSPHATE 12 MG: 4 INJECTION, SOLUTION INTRAMUSCULAR; INTRAVENOUS at 14:01

## 2017-01-01 RX ADMIN — DEXAMETHASONE SODIUM PHOSPHATE 12 MG: 4 INJECTION, SOLUTION INTRAMUSCULAR; INTRAVENOUS at 15:35

## 2017-01-01 RX ADMIN — ERIBULIN MESYLATE 2.52 MG: 0.5 INJECTION INTRAVENOUS at 15:53

## 2017-01-01 RX ADMIN — HEPARIN 500 UNITS: 100 SYRINGE at 17:03

## 2017-01-01 RX ADMIN — HYDRALAZINE HYDROCHLORIDE 50 MG: 50 TABLET, FILM COATED ORAL at 09:17

## 2017-01-01 RX ADMIN — ERIBULIN MESYLATE 2.52 MG: 0.5 INJECTION INTRAVENOUS at 15:07

## 2017-01-01 RX ADMIN — POLYETHYLENE GLYCOL 3350 17 G: 17 POWDER, FOR SOLUTION ORAL at 12:14

## 2017-01-01 RX ADMIN — IOPAMIDOL 95 ML: 612 INJECTION, SOLUTION INTRAVENOUS at 14:47

## 2017-01-01 RX ADMIN — DOCUSATE SODIUM 100 MG: 100 CAPSULE, LIQUID FILLED ORAL at 20:52

## 2017-01-01 RX ADMIN — Medication 10 ML: at 15:57

## 2017-01-01 RX ADMIN — POTASSIUM CHLORIDE AND SODIUM CHLORIDE 100 ML/HR: 900; 150 INJECTION, SOLUTION INTRAVENOUS at 20:53

## 2017-01-01 RX ADMIN — DEXAMETHASONE SODIUM PHOSPHATE 12 MG: 4 INJECTION, SOLUTION INTRAMUSCULAR; INTRAVENOUS at 14:35

## 2017-01-01 RX ADMIN — POTASSIUM CHLORIDE AND SODIUM CHLORIDE 100 ML/HR: 900; 150 INJECTION, SOLUTION INTRAVENOUS at 15:22

## 2017-01-01 RX ADMIN — QUINAPRIL 10 MG: 5 TABLET ORAL at 16:49

## 2017-01-01 RX ADMIN — HEPARIN 500 UNITS: 100 SYRINGE at 14:43

## 2017-01-01 RX ADMIN — HEPARIN 500 UNITS: 100 SYRINGE at 15:01

## 2017-01-01 RX ADMIN — DEXTROSE MONOHYDRATE 250 ML: 5 INJECTION, SOLUTION INTRAVENOUS at 14:41

## 2017-01-01 RX ADMIN — HEPARIN 500 UNITS: 100 SYRINGE at 12:05

## 2017-01-01 RX ADMIN — DOXORUBICIN HYDROCHLORIDE 92 MG: 2 INJECTION, SUSPENSION, LIPOSOMAL INTRAVENOUS at 14:41

## 2017-01-01 RX ADMIN — HYDRALAZINE HYDROCHLORIDE 50 MG: 50 TABLET, FILM COATED ORAL at 16:50

## 2017-01-01 RX ADMIN — DEXAMETHASONE SODIUM PHOSPHATE 12 MG: 4 INJECTION, SOLUTION INTRAMUSCULAR; INTRAVENOUS at 14:46

## 2017-01-01 RX ADMIN — DEXAMETHASONE SODIUM PHOSPHATE 12 MG: 4 INJECTION, SOLUTION INTRAMUSCULAR; INTRAVENOUS at 13:38

## 2017-01-01 RX ADMIN — HEPARIN 500 UNITS: 100 SYRINGE at 15:57

## 2017-01-01 RX ADMIN — ENOXAPARIN SODIUM 40 MG: 40 INJECTION SUBCUTANEOUS at 08:21

## 2017-01-01 RX ADMIN — SODIUM CHLORIDE 250 ML: 9 INJECTION, SOLUTION INTRAVENOUS at 13:38

## 2017-01-01 RX ADMIN — ERIBULIN MESYLATE 2.52 MG: 0.5 INJECTION INTRAVENOUS at 14:19

## 2017-01-01 RX ADMIN — DEXAMETHASONE SODIUM PHOSPHATE 12 MG: 4 INJECTION, SOLUTION INTRAMUSCULAR; INTRAVENOUS at 15:37

## 2017-01-01 RX ADMIN — PALONOSETRON HYDROCHLORIDE 0.25 MG: 0.25 INJECTION INTRAVENOUS at 13:38

## 2017-01-01 RX ADMIN — PROMETHAZINE HYDROCHLORIDE 6.25 MG: 25 INJECTION INTRAMUSCULAR; INTRAVENOUS at 08:50

## 2017-01-01 RX ADMIN — IOPAMIDOL 95 ML: 612 INJECTION, SOLUTION INTRAVENOUS at 17:40

## 2017-01-01 RX ADMIN — HEPARIN 500 UNITS: 100 SYRINGE at 15:02

## 2017-01-01 RX ADMIN — HEPARIN 500 UNITS: 100 SYRINGE at 15:31

## 2017-01-01 RX ADMIN — ONDANSETRON 4 MG: 2 INJECTION INTRAMUSCULAR; INTRAVENOUS at 08:46

## 2017-01-01 RX ADMIN — ONDANSETRON 4 MG: 2 INJECTION INTRAMUSCULAR; INTRAVENOUS at 05:50

## 2017-01-01 RX ADMIN — SODIUM CHLORIDE 250 ML: 9 INJECTION, SOLUTION INTRAVENOUS at 14:41

## 2017-01-01 RX ADMIN — LACTULOSE 10 G: 20 SOLUTION ORAL at 09:16

## 2017-01-01 RX ADMIN — SODIUM CHLORIDE 250 ML: 9 INJECTION, SOLUTION INTRAVENOUS at 15:45

## 2017-01-01 RX ADMIN — DEXTROSE MONOHYDRATE 250 ML: 50 INJECTION, SOLUTION INTRAVENOUS at 15:56

## 2017-01-01 RX ADMIN — QUINAPRIL 10 MG: 5 TABLET ORAL at 08:26

## 2017-01-01 RX ADMIN — QUINAPRIL 10 MG: 5 TABLET ORAL at 09:20

## 2017-01-01 RX ADMIN — SODIUM CHLORIDE 125 ML/HR: 9 INJECTION, SOLUTION INTRAVENOUS at 10:37

## 2017-01-01 RX ADMIN — HEPARIN 500 UNITS: 100 SYRINGE at 16:00

## 2017-01-01 RX ADMIN — DOXORUBICIN HYDROCHLORIDE 36 MG: 2 INJECTION, SOLUTION INTRAVENOUS at 13:57

## 2017-01-01 RX ADMIN — DEXAMETHASONE SODIUM PHOSPHATE 12 MG: 4 INJECTION, SOLUTION INTRAMUSCULAR; INTRAVENOUS at 16:09

## 2017-01-01 RX ADMIN — DEXAMETHASONE SODIUM PHOSPHATE 12 MG: 4 INJECTION, SOLUTION INTRAMUSCULAR; INTRAVENOUS at 15:36

## 2017-01-01 RX ADMIN — AMLODIPINE BESYLATE 10 MG: 10 TABLET ORAL at 08:21

## 2017-01-01 RX ADMIN — HEPARIN 500 UNITS: 100 SYRINGE at 14:32

## 2017-01-01 RX ADMIN — HEPARIN 500 UNITS: 100 SYRINGE at 14:06

## 2017-01-01 RX ADMIN — DOXORUBICIN HYDROCHLORIDE 36 MG: 2 INJECTION, SOLUTION INTRAVENOUS at 14:19

## 2017-01-01 RX ADMIN — SODIUM CHLORIDE 500 ML: 9 INJECTION, SOLUTION INTRAVENOUS at 08:34

## 2017-01-01 RX ADMIN — ERIBULIN MESYLATE 2.33 MG: 0.5 INJECTION INTRAVENOUS at 15:25

## 2017-01-01 RX ADMIN — HEPARIN 500 UNITS: 100 SYRINGE at 16:29

## 2017-01-01 RX ADMIN — DOXORUBICIN HYDROCHLORIDE 18 MG: 2 INJECTION, SOLUTION INTRAVENOUS at 16:25

## 2017-01-01 RX ADMIN — ALTEPLASE 2 MG: 2.2 INJECTION, POWDER, LYOPHILIZED, FOR SOLUTION INTRAVENOUS at 16:15

## 2017-01-01 RX ADMIN — Medication 10 ML: at 14:32

## 2017-01-01 RX ADMIN — HEPARIN 500 UNITS: 100 SYRINGE at 16:04

## 2017-01-01 RX ADMIN — SODIUM CHLORIDE 250 ML: 9 INJECTION, SOLUTION INTRAVENOUS at 13:42

## 2017-01-01 RX ADMIN — HEPARIN 500 UNITS: 100 SYRINGE at 16:15

## 2017-01-01 RX ADMIN — DOCUSATE SODIUM 100 MG: 100 CAPSULE, LIQUID FILLED ORAL at 09:17

## 2017-01-01 RX ADMIN — Medication 10 ML: at 15:02

## 2017-01-01 RX ADMIN — HYDRALAZINE HYDROCHLORIDE 50 MG: 50 TABLET, FILM COATED ORAL at 08:20

## 2017-01-01 RX ADMIN — Medication 10 ML: at 15:12

## 2017-01-01 RX ADMIN — DEXAMETHASONE SODIUM PHOSPHATE 12 MG: 4 INJECTION, SOLUTION INTRAMUSCULAR; INTRAVENOUS at 14:45

## 2017-01-01 RX ADMIN — LACTULOSE 10 G: 20 SOLUTION ORAL at 12:31

## 2017-01-01 RX ADMIN — ENOXAPARIN SODIUM 40 MG: 40 INJECTION SUBCUTANEOUS at 12:14

## 2017-01-01 RX ADMIN — SODIUM CHLORIDE 250 ML: 9 INJECTION, SOLUTION INTRAVENOUS at 14:47

## 2017-01-01 RX ADMIN — POLYETHYLENE GLYCOL 3350 17 G: 17 POWDER, FOR SOLUTION ORAL at 09:17

## 2017-01-01 RX ADMIN — SODIUM CHLORIDE 250 ML: 9 INJECTION, SOLUTION INTRAVENOUS at 13:59

## 2017-01-01 RX ADMIN — AMLODIPINE BESYLATE 10 MG: 10 TABLET ORAL at 09:17

## 2017-01-01 RX ADMIN — HEPARIN 500 UNITS: 100 SYRINGE at 15:05

## 2017-01-01 RX ADMIN — DOCUSATE SODIUM 100 MG: 100 CAPSULE, LIQUID FILLED ORAL at 08:21

## 2017-01-01 RX ADMIN — DOXORUBICIN HYDROCHLORIDE 92 MG: 2 INJECTION, SUSPENSION, LIPOSOMAL INTRAVENOUS at 15:57

## 2017-01-01 RX ADMIN — DEXAMETHASONE SODIUM PHOSPHATE 12 MG: 4 INJECTION, SOLUTION INTRAMUSCULAR; INTRAVENOUS at 13:52

## 2017-01-01 RX ADMIN — ERIBULIN MESYLATE 2.52 MG: 0.5 INJECTION INTRAVENOUS at 14:40

## 2017-01-01 RX ADMIN — SODIUM CHLORIDE 250 ML: 9 INJECTION, SOLUTION INTRAVENOUS at 13:52

## 2017-01-01 RX ADMIN — POTASSIUM CHLORIDE AND SODIUM CHLORIDE 100 ML/HR: 900; 150 INJECTION, SOLUTION INTRAVENOUS at 12:04

## 2017-01-01 RX ADMIN — ERIBULIN MESYLATE 2.52 MG: 0.5 INJECTION INTRAVENOUS at 14:01

## 2017-01-01 RX ADMIN — IOPAMIDOL 100 ML: 612 INJECTION, SOLUTION INTRAVENOUS at 14:10

## 2017-01-01 RX ADMIN — ERIBULIN MESYLATE 2.52 MG: 0.5 INJECTION INTRAVENOUS at 15:45

## 2017-01-01 RX ADMIN — HEPARIN 500 UNITS: 100 SYRINGE at 14:05

## 2017-01-01 RX ADMIN — ERIBULIN MESYLATE 2.52 MG: 0.5 INJECTION INTRAVENOUS at 15:57

## 2017-01-01 RX ADMIN — SODIUM CHLORIDE 250 ML: 9 INJECTION, SOLUTION INTRAVENOUS at 14:46

## 2017-01-01 RX ADMIN — Medication 10 ML: at 13:13

## 2017-01-01 RX ADMIN — ERIBULIN MESYLATE 2.52 MG: 0.5 INJECTION INTRAVENOUS at 14:56

## 2017-01-01 RX ADMIN — AMLODIPINE BESYLATE 10 MG: 10 TABLET ORAL at 16:50

## 2017-01-03 ENCOUNTER — INFUSION (OUTPATIENT)
Dept: ONCOLOGY | Facility: HOSPITAL | Age: 77
End: 2017-01-03

## 2017-01-03 VITALS
HEIGHT: 63 IN | DIASTOLIC BLOOD PRESSURE: 63 MMHG | WEIGHT: 173 LBS | HEART RATE: 106 BPM | BODY MASS INDEX: 30.65 KG/M2 | SYSTOLIC BLOOD PRESSURE: 143 MMHG | RESPIRATION RATE: 24 BRPM | TEMPERATURE: 98.3 F

## 2017-01-03 DIAGNOSIS — R91.8 LUNG NODULES: Primary | ICD-10-CM

## 2017-01-03 DIAGNOSIS — C50.111 MALIGNANT NEOPLASM OF CENTRAL PORTION OF RIGHT FEMALE BREAST (HCC): ICD-10-CM

## 2017-01-03 LAB
ERYTHROCYTE [DISTWIDTH] IN BLOOD BY AUTOMATED COUNT: 18.5 % (ref 11.3–14.5)
ERYTHROCYTE [DISTWIDTH] IN BLOOD BY AUTOMATED COUNT: 18.7 % (ref 11.3–14.5)
HCT VFR BLD AUTO: 20.4 % (ref 34.5–44)
HCT VFR BLD AUTO: 35.3 % (ref 34.5–44)
HGB BLD-MCNC: 10.8 G/DL (ref 11.5–15.5)
HGB BLD-MCNC: 6.2 G/DL (ref 11.5–15.5)
LYMPHOCYTES # BLD AUTO: 1.3 10*3/MM3 (ref 0.6–4.8)
LYMPHOCYTES # BLD AUTO: 1.9 10*3/MM3 (ref 0.6–4.8)
LYMPHOCYTES NFR BLD AUTO: 35.2 % (ref 24–44)
LYMPHOCYTES NFR BLD AUTO: 38.5 % (ref 24–44)
MCH RBC QN AUTO: 28.5 PG (ref 27–31)
MCH RBC QN AUTO: 28.6 PG (ref 27–31)
MCHC RBC AUTO-ENTMCNC: 30.3 G/DL (ref 32–36)
MCHC RBC AUTO-ENTMCNC: 30.6 G/DL (ref 32–36)
MCV RBC AUTO: 93 FL (ref 80–99)
MCV RBC AUTO: 94.3 FL (ref 80–99)
MONOCYTES # BLD AUTO: 0.2 10*3/MM3 (ref 0–1)
MONOCYTES # BLD AUTO: 0.4 10*3/MM3 (ref 0–1)
MONOCYTES NFR BLD AUTO: 4.8 % (ref 0–12)
MONOCYTES NFR BLD AUTO: 8 % (ref 0–12)
NEUTROPHILS # BLD AUTO: 1.9 10*3/MM3 (ref 1.5–8.3)
NEUTROPHILS # BLD AUTO: 3 10*3/MM3 (ref 1.5–8.3)
NEUTROPHILS NFR BLD AUTO: 56.7 % (ref 41–71)
NEUTROPHILS NFR BLD AUTO: 56.8 % (ref 41–71)
PLATELET # BLD AUTO: 150 10*3/MM3 (ref 150–450)
PLATELET # BLD AUTO: 272 10*3/MM3 (ref 150–450)
PMV BLD AUTO: 6.5 FL (ref 6–12)
PMV BLD AUTO: 6.7 FL (ref 6–12)
RBC # BLD AUTO: 2.16 10*6/MM3 (ref 3.89–5.14)
RBC # BLD AUTO: 3.79 10*6/MM3 (ref 3.89–5.14)
WBC NRBC COR # BLD: 3.3 10*3/MM3 (ref 3.5–10.8)
WBC NRBC COR # BLD: 5.3 10*3/MM3 (ref 3.5–10.8)

## 2017-01-03 PROCEDURE — 96375 TX/PRO/DX INJ NEW DRUG ADDON: CPT

## 2017-01-03 PROCEDURE — 25010000002 GEMCITABINE PER 200 MG: Performed by: INTERNAL MEDICINE

## 2017-01-03 PROCEDURE — 25010000002 DEXAMETHASONE PER 1 MG: Performed by: INTERNAL MEDICINE

## 2017-01-03 PROCEDURE — 25010000002 HEPARIN FLUSH (PORCINE) 100 UNIT/ML SOLUTION: Performed by: INTERNAL MEDICINE

## 2017-01-03 PROCEDURE — 96413 CHEMO IV INFUSION 1 HR: CPT

## 2017-01-03 RX ORDER — SODIUM CHLORIDE 9 MG/ML
250 INJECTION, SOLUTION INTRAVENOUS ONCE
Status: COMPLETED | OUTPATIENT
Start: 2017-01-03 | End: 2017-01-03

## 2017-01-03 RX ORDER — SODIUM CHLORIDE 0.9 % (FLUSH) 0.9 %
10 SYRINGE (ML) INJECTION AS NEEDED
Status: CANCELLED | OUTPATIENT
Start: 2017-01-03

## 2017-01-03 RX ADMIN — HEPARIN 500 UNITS: 100 SYRINGE at 15:23

## 2017-01-03 RX ADMIN — GEMCITABINE 1850 MG: 1 INJECTION, POWDER, LYOPHILIZED, FOR SOLUTION INTRAVENOUS at 14:49

## 2017-01-03 RX ADMIN — DEXAMETHASONE SODIUM PHOSPHATE 12 MG: 4 INJECTION, SOLUTION INTRAMUSCULAR; INTRAVENOUS at 14:26

## 2017-01-03 RX ADMIN — SODIUM CHLORIDE 250 ML: 9 INJECTION, SOLUTION INTRAVENOUS at 14:49

## 2017-01-17 ENCOUNTER — INFUSION (OUTPATIENT)
Dept: ONCOLOGY | Facility: HOSPITAL | Age: 77
End: 2017-01-17

## 2017-01-17 ENCOUNTER — APPOINTMENT (OUTPATIENT)
Dept: ONCOLOGY | Facility: HOSPITAL | Age: 77
End: 2017-01-17

## 2017-01-17 ENCOUNTER — OFFICE VISIT (OUTPATIENT)
Dept: ONCOLOGY | Facility: CLINIC | Age: 77
End: 2017-01-17

## 2017-01-17 VITALS
HEIGHT: 63 IN | RESPIRATION RATE: 22 BRPM | DIASTOLIC BLOOD PRESSURE: 59 MMHG | HEART RATE: 115 BPM | WEIGHT: 173 LBS | BODY MASS INDEX: 30.65 KG/M2 | SYSTOLIC BLOOD PRESSURE: 127 MMHG | TEMPERATURE: 98.7 F

## 2017-01-17 VITALS
BODY MASS INDEX: 30.65 KG/M2 | RESPIRATION RATE: 28 BRPM | HEIGHT: 63 IN | SYSTOLIC BLOOD PRESSURE: 150 MMHG | WEIGHT: 173 LBS | DIASTOLIC BLOOD PRESSURE: 57 MMHG | OXYGEN SATURATION: 91 % | HEART RATE: 107 BPM | TEMPERATURE: 97.3 F

## 2017-01-17 DIAGNOSIS — R91.8 LUNG NODULES: ICD-10-CM

## 2017-01-17 DIAGNOSIS — C50.111 MALIGNANT NEOPLASM OF CENTRAL PORTION OF RIGHT FEMALE BREAST (HCC): Primary | ICD-10-CM

## 2017-01-17 LAB
ALBUMIN SERPL-MCNC: 3.8 G/DL (ref 3.2–4.8)
ALBUMIN/GLOB SERPL: 1.2 G/DL (ref 1.5–2.5)
ALP SERPL-CCNC: 108 U/L (ref 25–100)
ALT SERPL W P-5'-P-CCNC: 16 U/L (ref 7–40)
ANION GAP SERPL CALCULATED.3IONS-SCNC: 9 MMOL/L (ref 3–11)
AST SERPL-CCNC: 29 U/L (ref 0–33)
BILIRUB SERPL-MCNC: 0.3 MG/DL (ref 0.3–1.2)
BUN BLD-MCNC: 15 MG/DL (ref 9–23)
BUN/CREAT SERPL: 18.8 (ref 7–25)
CALCIUM SPEC-SCNC: 9.6 MG/DL (ref 8.7–10.4)
CHLORIDE SERPL-SCNC: 103 MMOL/L (ref 99–109)
CO2 SERPL-SCNC: 33 MMOL/L (ref 20–31)
CREAT BLD-MCNC: 0.8 MG/DL (ref 0.6–1.3)
ERYTHROCYTE [DISTWIDTH] IN BLOOD BY AUTOMATED COUNT: 20.1 % (ref 11.3–14.5)
GFR SERPL CREATININE-BSD FRML MDRD: 84 ML/MIN/1.73
GLOBULIN UR ELPH-MCNC: 3.2 GM/DL
GLUCOSE BLD-MCNC: 103 MG/DL (ref 70–100)
HCT VFR BLD AUTO: 35.3 % (ref 34.5–44)
HGB BLD-MCNC: 10.5 G/DL (ref 11.5–15.5)
LYMPHOCYTES # BLD AUTO: 2.1 10*3/MM3 (ref 0.6–4.8)
LYMPHOCYTES NFR BLD AUTO: 29 % (ref 24–44)
MCH RBC QN AUTO: 28.3 PG (ref 27–31)
MCHC RBC AUTO-ENTMCNC: 29.8 G/DL (ref 32–36)
MCV RBC AUTO: 94.9 FL (ref 80–99)
MONOCYTES # BLD AUTO: 0.4 10*3/MM3 (ref 0–1)
MONOCYTES NFR BLD AUTO: 5.1 % (ref 0–12)
NEUTROPHILS # BLD AUTO: 4.7 10*3/MM3 (ref 1.5–8.3)
NEUTROPHILS NFR BLD AUTO: 65.9 % (ref 41–71)
PLATELET # BLD AUTO: 403 10*3/MM3 (ref 150–450)
PMV BLD AUTO: 7.6 FL (ref 6–12)
POTASSIUM BLD-SCNC: 4.4 MMOL/L (ref 3.5–5.5)
PROT SERPL-MCNC: 7 G/DL (ref 5.7–8.2)
RBC # BLD AUTO: 3.72 10*6/MM3 (ref 3.89–5.14)
SODIUM BLD-SCNC: 145 MMOL/L (ref 132–146)
WBC NRBC COR # BLD: 7.1 10*3/MM3 (ref 3.5–10.8)

## 2017-01-17 PROCEDURE — 96413 CHEMO IV INFUSION 1 HR: CPT

## 2017-01-17 PROCEDURE — 80053 COMPREHEN METABOLIC PANEL: CPT | Performed by: INTERNAL MEDICINE

## 2017-01-17 PROCEDURE — 25010000002 HEPARIN FLUSH (PORCINE) 100 UNIT/ML SOLUTION: Performed by: INTERNAL MEDICINE

## 2017-01-17 PROCEDURE — 99214 OFFICE O/P EST MOD 30 MIN: CPT | Performed by: INTERNAL MEDICINE

## 2017-01-17 PROCEDURE — 25010000002 GEMCITABINE PER 200 MG: Performed by: INTERNAL MEDICINE

## 2017-01-17 PROCEDURE — 25010000002 DEXAMETHASONE PER 1 MG: Performed by: INTERNAL MEDICINE

## 2017-01-17 PROCEDURE — 96375 TX/PRO/DX INJ NEW DRUG ADDON: CPT

## 2017-01-17 RX ORDER — HYDROCODONE BITARTRATE AND ACETAMINOPHEN 5; 325 MG/1; MG/1
1 TABLET ORAL EVERY 8 HOURS PRN
Qty: 90 TABLET | Refills: 0 | Status: SHIPPED | OUTPATIENT
Start: 2017-01-17 | End: 2017-03-14 | Stop reason: SDUPTHER

## 2017-01-17 RX ORDER — SODIUM CHLORIDE 9 MG/ML
250 INJECTION, SOLUTION INTRAVENOUS ONCE
Status: COMPLETED | OUTPATIENT
Start: 2017-01-17 | End: 2017-01-17

## 2017-01-17 RX ORDER — SODIUM CHLORIDE 0.9 % (FLUSH) 0.9 %
10 SYRINGE (ML) INJECTION AS NEEDED
Status: DISCONTINUED | OUTPATIENT
Start: 2017-01-17 | End: 2017-01-17 | Stop reason: HOSPADM

## 2017-01-17 RX ORDER — SODIUM CHLORIDE 0.9 % (FLUSH) 0.9 %
10 SYRINGE (ML) INJECTION AS NEEDED
Status: CANCELLED | OUTPATIENT
Start: 2017-01-17

## 2017-01-17 RX ADMIN — DEXAMETHASONE SODIUM PHOSPHATE 12 MG: 4 INJECTION, SOLUTION INTRAMUSCULAR; INTRAVENOUS at 13:41

## 2017-01-17 RX ADMIN — SODIUM CHLORIDE 250 ML: 9 INJECTION, SOLUTION INTRAVENOUS at 13:41

## 2017-01-17 RX ADMIN — SODIUM CHLORIDE, PRESERVATIVE FREE 10 ML: 5 INJECTION INTRAVENOUS at 14:46

## 2017-01-17 RX ADMIN — HEPARIN 500 UNITS: 100 SYRINGE at 14:46

## 2017-01-17 RX ADMIN — GEMCITABINE 1850 MG: 1 INJECTION, POWDER, LYOPHILIZED, FOR SOLUTION INTRAVENOUS at 14:06

## 2017-01-17 NOTE — MR AVS SNAPSHOT
Nava GOYAL Son   1/17/2017 1:00 PM   Office Visit    Dept Phone:  [phone]   Encounter #:  [id number]    Provider:  France Chun MD   Department:  [facility] HEMATOLOGY  AND ONCOLOGY                Your Full Care Plan              Your Updated Medication List          This list is accurate as of: 1/17/17  1:29 PM.  Always use your most recent med list.                amLODIPine 10 MG tablet   Commonly known as:  NORVASC       hydrALAZINE 25 MG tablet   Commonly known as:  APRESOLINE       HYDROcodone-acetaminophen 5-325 MG per tablet   Commonly known as:  NORCO   Take 1 tablet by mouth Every 8 (Eight) Hours As Needed for moderate pain (4-6).       lidocaine-prilocaine 2.5-2.5 % cream   Commonly known as:  EMLA   Apply  topically As Needed for mild pain (1-3) (prior to port access).       quinapril 10 MG tablet   Commonly known as:  ACCUPRIL       TAB-A-TESS tablet       VENTOLIN  (90 BASE) MCG/ACT inhaler   Generic drug:  albuterol               We Performed the Following     Physician Communication Order       Instructions     None    Patient Instructions History      MyChart Signup     Our records indicate that your [facility] account has been deactivated. If you would like to reactivate your account, please email [email or web address] or call [phone] to talk to our [employer] staff.             Other Info from Your Visit           Your appointments     Date & Time Provider Appointment Department    Jan 17, 2017  1:30 PM EST CHAIR 3 INFUSION [facility] OUTPATIENT ONCOLOGY    Feb 02, 2017  1:30 PM EST JOYCE CT 1 CT joyce abd pelvis w contrast [facility] CT    If ordered with oral contrast, patient must arrive 1 hour prior to the appointment to drink contrast. NPO 2 hrs prior    Feb 07, 2017  1:15 PM EST France Chun MD FOLLOW UP [facility] HEMATOLOGY  AND ONCOLOGY    Feb 07, 2017  2:00 PM EST  "CHAIR 1 [facility] OUTPATIENT ONCOLOGY        [facility]  [address] [facility] [address] [phone] [facility] OUTPATIENT ONCOLOGY  [facility]  [address]  [facility] [address] [phone] [facility] HEMATOLOGY  AND ONCOLOGY  [place]  [address] [place] [address] [address]  [facility] [address] [phone]              Vital Signs     Blood Pressure Pulse Temperature Respirations Height Weight    150/57 107 97.3 °F (36.3 °C) (Temporal Artery ) 28 63\" (160 cm) 173 lb (78.5 kg)    Oxygen Saturation Body Mass Index Smoking Status             91% 30.65 kg/m2 Former Smoker         Problems and Diagnoses Noted     Breast cancer        "

## 2017-01-17 NOTE — LETTER
January 17, 2017     Alvino CORNELIUS MD  [address]  [facility] [address]    Patient: Nava Cline   YOB: 1940   Date of Visit: 1/17/2017       Dear Dr. Una MD:    Nava Cline was in my office today. Below is a copy of my note.    If you have questions, please do not hesitate to call me. I look forward to following Nava along with you.         Sincerely,        France Chun MD        CC: No Recipients    DATE OF VISIT: 1/17/2017    REASON FOR VISIT: Followup for   1. Breast cancer:   a) Initially presenting as stage IIB disease, T2N1M0, estrogen receptor  strongly positive, progesterone receptor intermediate, HER-2/jean marie negative.   b) Status post right mastectomy followed by adjuvant chemotherapy using  Taxotere and Cytoxan 4 cycles.   c) Took Arimidex 1 mg p.o. daily from 01/10/2013 until 04/28/2015.   2. Locally relapsed disease with right chest wall as well as axillary lymph  nodes biopsy proven metastatic disease done 04/17/2015.  3. Enrolled on clinical trial [employer]  randomizing patients for 2nd line  hormone treatment with Aromasin with placebo versus Aromasin with Entinostat.   4. Currently on Aromasin single agent.   5. Completed adjuvant radiation treatment by Dr. Benavides to the right chest  wall as well as right axillary lymph nodes 12/09/2015.   6. Currently metastatic disease with new liver metastases documented on CAT  scan 04/22/2016.   7. Started Faslodex Ibrance 05/02/2016.   8. Switched to Carbo/gemzar weekly secondary to progressive disease from 07/22/2016 till 10/11/2016.  9. Started gemzar single agent weekly 2 weeks on and 1 week off 10/25/2016.     HISTORY OF PRESENT ILLNESS: The patient is a very pleasant 75-year-old female  with past medical history significant for invasive ductal carcinoma of the  right breast diagnosed 06/21/2012. The patient is status post right mastectomy  on 07/13/2012 with lymph node dissection, tumor greatest dimension was 2.5  cm,  2 out of 8 positive axial lymph nodes, clear surgical margins. The patient was  started on adjuvant chemotherapy using Taxotere and Cytoxan on 10/08/2012. The  patient completed her treatment on 12/10/2012. The patient was started on  adjuvant hormone treatment using Arimidex 1 mg p.o. daily 01/10/2013. The  patient presented with right chest wall mass. Biopsy done on 04/17/2015  revealed relapsed high-grade ductal carcinoma. She had CAT scan of the chest,  abdomen, and pelvis that revealed disease in the right chest wall as well as  right axilla. The patient was enrolled with ECoG  Aromasin with or without  Entinostat on 05/08/2015. The patient had progressive disease documented on  scans done 04/22/2016 with liver metastases. The patient was started on  Faslodex Ibrance on 05/02/2016. Repeat CT scan done 7/22/2016 showed continued progression of disease, and the patient's treatment was changed to carbo/gemzar 2 weeks on, 1 week off. Completed 5 cycles on 10/11/2016. shw started Gemzar single agent 10/25/2016. The patient is here today for cycle #4, day 1.      SUBJECTIVE: The patient has been doing fairly well. She complains of some pain in her right chest wall that seems to worsen with coughing. She also notes discomfort related to her arthritis, mostly in her left hip. This is improved with use of Norco.  She got her port placed by Dr. Heart with no difficulties. She is eating and drinking without difficulty. Her bowels and bladder are working well.     PAST MEDICAL HISTORY/SOCIAL HISTORY/FAMILY HISTORY: Unchanged from my prior documentation done on 10/2/2012.     Review of Systems   Constitutional: Negative for activity change, appetite change, chills, fatigue, fever and unexpected weight change.   HENT: Negative for hearing loss, mouth sores, nosebleeds, sore throat and trouble swallowing.    Eyes: Negative for visual disturbance.   Respiratory: Positive for cough. Negative for chest tightness,  "shortness of breath and wheezing.    Cardiovascular: Negative for chest pain, palpitations and leg swelling.   Gastrointestinal: Negative for abdominal distention, abdominal pain, blood in stool, constipation, diarrhea, nausea, rectal pain and vomiting.   Endocrine: Negative for cold intolerance and heat intolerance.   Genitourinary: Negative for difficulty urinating, dysuria, frequency and urgency.   Musculoskeletal: Positive for arthralgias and joint swelling. Negative for back pain, gait problem and myalgias.   Skin: Negative for rash.   Neurological: Negative for dizziness, tremors, syncope, weakness, light-headedness, numbness and headaches.   Hematological: Negative for adenopathy. Does not bruise/bleed easily.   Psychiatric/Behavioral: Negative for confusion, sleep disturbance and suicidal ideas. The patient is not nervous/anxious.          Current Outpatient Prescriptions:   •  amLODIPine (NORVASC) 10 MG tablet, Take 10 mg by mouth daily., Disp: , Rfl:   •  hydrALAZINE (APRESOLINE) 25 MG tablet, Take 25 mg by mouth 3 (three) times a day., Disp: , Rfl:   •  HYDROcodone-acetaminophen (NORCO) 5-325 MG per tablet, Take 1 tablet by mouth Every 8 (Eight) Hours As Needed for moderate pain (4-6)., Disp: 90 tablet, Rfl: 0  •  lidocaine-prilocaine (EMLA) 2.5-2.5 % cream, Apply  topically As Needed for mild pain (1-3) (prior to port access)., Disp: 30 g, Rfl: 2  •  Multiple Vitamin (TAB-A-TESS) tablet, Take 1 tablet by mouth daily., Disp: , Rfl: 0  •  quinapril (ACCUPRIL) 10 MG tablet, Take 10 mg by mouth every night., Disp: , Rfl:   •  VENTOLIN  (90 BASE) MCG/ACT inhaler, Inhale 2 puffs every 6 (six) hours as needed., Disp: , Rfl: 0    PHYSICAL EXAMINATION:   Visit Vitals   • /57   • Pulse 107   • Temp 97.3 °F (36.3 °C) (Temporal Artery )   • Resp 28   • Ht 63\" (160 cm)   • Wt 173 lb (78.5 kg)   • SpO2 91%   • BMI 30.65 kg/m2      ECOG Performance Status: 1  General Appearance:  alert, cooperative, no " apparent distress and appears stated age   Neurologic/Psychiatric: A&O x 3, gait steady, appropriate affect, strength 5/5 in all muscle groups   HEENT:  Normocephalic, without obvious abnormality, mucous membranes moist   Neck: Supple, symmetrical, trachea midline, no adenopathy;  No thyromegaly, masses, or tenderness   Lungs:   Clear to auscultation bilaterally; respirations regular, even, and unlabored bilaterally   Heart:  Regular rate and rhythm, no murmurs appreciated   Abdomen:   Soft, non-tender, non-distended and no organomegaly   Lymph nodes: No cervical, supraclavicular, inguinal or axillary adenopathy noted   Extremities: Normal, atraumatic; no clubbing, cyanosis, or edema    Skin: No rashes, ulcers, or suspicious lesions noted     Infusion on 01/17/2017   Component Date Value Ref Range Status   • WBC 01/17/2017 7.10  3.50 - 10.80 10*3/mm3 Final   • RBC 01/17/2017 3.72* 3.89 - 5.14 10*6/mm3 Final   • Hemoglobin 01/17/2017 10.5* 11.5 - 15.5 g/dL Final   • Hematocrit 01/17/2017 35.3  34.5 - 44.0 % Final   • RDW 01/17/2017 20.1* 11.3 - 14.5 % Final   • MCV 01/17/2017 94.9  80.0 - 99.0 fL Final   • MCH 01/17/2017 28.3  27.0 - 31.0 pg Final   • MCHC 01/17/2017 29.8* 32.0 - 36.0 g/dL Final   • MPV 01/17/2017 7.6  6.0 - 12.0 fL Final   • Platelets 01/17/2017 403  150 - 450 10*3/mm3 Final   • Neutrophil % 01/17/2017 65.9  41.0 - 71.0 % Final   • Lymphocyte % 01/17/2017 29.0  24.0 - 44.0 % Final   • Monocyte % 01/17/2017 5.1  0.0 - 12.0 % Final   • Neutrophils, Absolute 01/17/2017 4.70  1.50 - 8.30 10*3/mm3 Final   • Lymphocytes, Absolute 01/17/2017 2.10  0.60 - 4.80 10*3/mm3 Final   • Monocytes, Absolute 01/17/2017 0.40  0.00 - 1.00 10*3/mm3 Final        No results found.    ASSESSMENT: The patient is a very pleasant 75-year-old female with right breast  cancer.     PROBLEM LIST:  1. T2N1M0 invasive ductal carcinoma of the right breast, tumor greatest  dimension 2.5 cm, estrogen receptor strongly positive,  progesterone receptor  intermediate, HER-2/jean marie negative by IHC score of 0, 2 out of 8 positive  axillary lymph nodes.   2. Status post right mastectomy with axillary lymph node dissection done by  Dr. Heart on 07/30/2012.   3. Status post 4 cycles of adjuvant chemotherapy using Taxotere and Cytoxan  from 10/08/2012 until 12/10/2012.   4. Took Arimidex 1 mg p.o. daily from 01/10/2013 until 04/28/2015.   5. Relapsed disease documented on CAT scan of the chest, abdomen, and pelvis  done 04/27/2015. Revealed right chest wall mass as well as right axillary  lymphadenopathy. Biopsy done 04/17/2015 revealed invasive ductal carcinoma.   6. Enrolled on ECoG  protocol, Aromasin with placebo versus Aromasin and  Entinostat, 05/08/2015.   7. Stopped Aromasin 04/25/2016 secondary to new liver metastases documented on  CAT scan 04/22/2016.   8. Completed adjuvant radiation treatment to the right chest wall 12/09/2015.  9. Status post right axillary dissection done by Dr. Heart 03/14/2016.   10. Progressive disease documented on CAT scan done 04/22/2016 with new liver  metastases.   11. Started Faslodex Ibrance 05/02/2016.  12. Neutropenia secondary Ibrance. The patient is on 100 mg, 3 weeks on and 1  week off.   13. Rheumatoid arthritis.   14. Worsening metastatic disease documented on CT scans done on 07/25/2016.  15. Started on carboplatin AUC 2 with gemzar given two weeks on, one week off. She is status post three cycle of treatment.   16. Mixed response to treatment documented on CAT scans done 11/15/2016.  17. ON gemzar single agent, status post 3 cycles.    PLAN:  1. We will proceed with treatment today with gemzar weekly 2 weeks on and 1 week off.   2. She will come back to see us in 3 weeks prior to her next cycle of treatment.   3. We sent in a prescription for EMLA cream to be used prior to port access.  4. We will continue Gemzar alone until unacceptable toxicity or disease progression.   5. The patient will  continue to hold her methotrexate as she is on active cancer treatment.  6. We will continue to monitor the patient's blood counts, kidney function, and liver function throughout treatment.   7. We will continue Norco 5/325 mg 1 tablet taken every 6 hours as needed for cancer-related pain. She did not need a refill on this prescription today.   8.  I'll repeat her CAT scans prior to return this would be in 3 months follow-up study.       Eliza Heart, APRN  1/17/2017

## 2017-01-17 NOTE — PROGRESS NOTES
DATE OF VISIT: 1/17/2017    REASON FOR VISIT: Followup for   1. Breast cancer:   a) Initially presenting as stage IIB disease, T2N1M0, estrogen receptor  strongly positive, progesterone receptor intermediate, HER-2/jean marie negative.   b) Status post right mastectomy followed by adjuvant chemotherapy using  Taxotere and Cytoxan 4 cycles.   c) Took Arimidex 1 mg p.o. daily from 01/10/2013 until 04/28/2015.   2. Locally relapsed disease with right chest wall as well as axillary lymph  nodes biopsy proven metastatic disease done 04/17/2015.  3. Enrolled on clinical trial [employer]  randomizing patients for 2nd line  hormone treatment with Aromasin with placebo versus Aromasin with Entinostat.   4. Currently on Aromasin single agent.   5. Completed adjuvant radiation treatment by Dr. Benavides to the right chest  wall as well as right axillary lymph nodes 12/09/2015.   6. Currently metastatic disease with new liver metastases documented on CAT  scan 04/22/2016.   7. Started Faslodex Ibrance 05/02/2016.   8. Switched to Carbo/gemzar weekly secondary to progressive disease from 07/22/2016 till 10/11/2016.  9. Started gemzar single agent weekly 2 weeks on and 1 week off 10/25/2016.     HISTORY OF PRESENT ILLNESS: The patient is a very pleasant 75-year-old female  with past medical history significant for invasive ductal carcinoma of the  right breast diagnosed 06/21/2012. The patient is status post right mastectomy  on 07/13/2012 with lymph node dissection, tumor greatest dimension was 2.5 cm,  2 out of 8 positive axial lymph nodes, clear surgical margins. The patient was  started on adjuvant chemotherapy using Taxotere and Cytoxan on 10/08/2012. The  patient completed her treatment on 12/10/2012. The patient was started on  adjuvant hormone treatment using Arimidex 1 mg p.o. daily 01/10/2013. The  patient presented with right chest wall mass. Biopsy done on 04/17/2015  revealed relapsed high-grade ductal carcinoma. She had CAT  scan of the chest,  abdomen, and pelvis that revealed disease in the right chest wall as well as  right axilla. The patient was enrolled with ECoG  Aromasin with or without  Entinostat on 05/08/2015. The patient had progressive disease documented on  scans done 04/22/2016 with liver metastases. The patient was started on  Faslodex Ibrance on 05/02/2016. Repeat CT scan done 7/22/2016 showed continued progression of disease, and the patient's treatment was changed to carbo/gemzar 2 weeks on, 1 week off. Completed 5 cycles on 10/11/2016. shw started Gemzar single agent 10/25/2016. The patient is here today for cycle #4, day 1.      SUBJECTIVE: The patient has been doing fairly well. She complains of some pain in her right chest wall that seems to worsen with coughing. She also notes discomfort related to her arthritis, mostly in her left hip. This is improved with use of Norco.  She got her port placed by Dr. Heart with no difficulties. She is eating and drinking without difficulty. Her bowels and bladder are working well.     PAST MEDICAL HISTORY/SOCIAL HISTORY/FAMILY HISTORY: Unchanged from my prior documentation done on 10/2/2012.     Review of Systems   Constitutional: Negative for activity change, appetite change, chills, fatigue, fever and unexpected weight change.   HENT: Negative for hearing loss, mouth sores, nosebleeds, sore throat and trouble swallowing.    Eyes: Negative for visual disturbance.   Respiratory: Positive for cough. Negative for chest tightness, shortness of breath and wheezing.    Cardiovascular: Negative for chest pain, palpitations and leg swelling.   Gastrointestinal: Negative for abdominal distention, abdominal pain, blood in stool, constipation, diarrhea, nausea, rectal pain and vomiting.   Endocrine: Negative for cold intolerance and heat intolerance.   Genitourinary: Negative for difficulty urinating, dysuria, frequency and urgency.   Musculoskeletal: Positive for arthralgias and  "joint swelling. Negative for back pain, gait problem and myalgias.   Skin: Negative for rash.   Neurological: Negative for dizziness, tremors, syncope, weakness, light-headedness, numbness and headaches.   Hematological: Negative for adenopathy. Does not bruise/bleed easily.   Psychiatric/Behavioral: Negative for confusion, sleep disturbance and suicidal ideas. The patient is not nervous/anxious.          Current Outpatient Prescriptions:   •  amLODIPine (NORVASC) 10 MG tablet, Take 10 mg by mouth daily., Disp: , Rfl:   •  hydrALAZINE (APRESOLINE) 25 MG tablet, Take 25 mg by mouth 3 (three) times a day., Disp: , Rfl:   •  HYDROcodone-acetaminophen (NORCO) 5-325 MG per tablet, Take 1 tablet by mouth Every 8 (Eight) Hours As Needed for moderate pain (4-6)., Disp: 90 tablet, Rfl: 0  •  lidocaine-prilocaine (EMLA) 2.5-2.5 % cream, Apply  topically As Needed for mild pain (1-3) (prior to port access)., Disp: 30 g, Rfl: 2  •  Multiple Vitamin (TAB-A-TESS) tablet, Take 1 tablet by mouth daily., Disp: , Rfl: 0  •  quinapril (ACCUPRIL) 10 MG tablet, Take 10 mg by mouth every night., Disp: , Rfl:   •  VENTOLIN  (90 BASE) MCG/ACT inhaler, Inhale 2 puffs every 6 (six) hours as needed., Disp: , Rfl: 0    PHYSICAL EXAMINATION:   Visit Vitals   • /57   • Pulse 107   • Temp 97.3 °F (36.3 °C) (Temporal Artery )   • Resp 28   • Ht 63\" (160 cm)   • Wt 173 lb (78.5 kg)   • SpO2 91%   • BMI 30.65 kg/m2      ECOG Performance Status: 1  General Appearance:  alert, cooperative, no apparent distress and appears stated age   Neurologic/Psychiatric: A&O x 3, gait steady, appropriate affect, strength 5/5 in all muscle groups   HEENT:  Normocephalic, without obvious abnormality, mucous membranes moist   Neck: Supple, symmetrical, trachea midline, no adenopathy;  No thyromegaly, masses, or tenderness   Lungs:   Clear to auscultation bilaterally; respirations regular, even, and unlabored bilaterally   Heart:  Regular rate and rhythm, " no murmurs appreciated   Abdomen:   Soft, non-tender, non-distended and no organomegaly   Lymph nodes: No cervical, supraclavicular, inguinal or axillary adenopathy noted   Extremities: Normal, atraumatic; no clubbing, cyanosis, or edema    Skin: No rashes, ulcers, or suspicious lesions noted     Infusion on 01/17/2017   Component Date Value Ref Range Status   • WBC 01/17/2017 7.10  3.50 - 10.80 10*3/mm3 Final   • RBC 01/17/2017 3.72* 3.89 - 5.14 10*6/mm3 Final   • Hemoglobin 01/17/2017 10.5* 11.5 - 15.5 g/dL Final   • Hematocrit 01/17/2017 35.3  34.5 - 44.0 % Final   • RDW 01/17/2017 20.1* 11.3 - 14.5 % Final   • MCV 01/17/2017 94.9  80.0 - 99.0 fL Final   • MCH 01/17/2017 28.3  27.0 - 31.0 pg Final   • MCHC 01/17/2017 29.8* 32.0 - 36.0 g/dL Final   • MPV 01/17/2017 7.6  6.0 - 12.0 fL Final   • Platelets 01/17/2017 403  150 - 450 10*3/mm3 Final   • Neutrophil % 01/17/2017 65.9  41.0 - 71.0 % Final   • Lymphocyte % 01/17/2017 29.0  24.0 - 44.0 % Final   • Monocyte % 01/17/2017 5.1  0.0 - 12.0 % Final   • Neutrophils, Absolute 01/17/2017 4.70  1.50 - 8.30 10*3/mm3 Final   • Lymphocytes, Absolute 01/17/2017 2.10  0.60 - 4.80 10*3/mm3 Final   • Monocytes, Absolute 01/17/2017 0.40  0.00 - 1.00 10*3/mm3 Final        No results found.    ASSESSMENT: The patient is a very pleasant 75-year-old female with right breast  cancer.     PROBLEM LIST:  1. T2N1M0 invasive ductal carcinoma of the right breast, tumor greatest  dimension 2.5 cm, estrogen receptor strongly positive, progesterone receptor  intermediate, HER-2/jean marie negative by IHC score of 0, 2 out of 8 positive  axillary lymph nodes.   2. Status post right mastectomy with axillary lymph node dissection done by  Dr. Heart on 07/30/2012.   3. Status post 4 cycles of adjuvant chemotherapy using Taxotere and Cytoxan  from 10/08/2012 until 12/10/2012.   4. Took Arimidex 1 mg p.o. daily from 01/10/2013 until 04/28/2015.   5. Relapsed disease documented on CAT scan of the chest,  abdomen, and pelvis  done 04/27/2015. Revealed right chest wall mass as well as right axillary  lymphadenopathy. Biopsy done 04/17/2015 revealed invasive ductal carcinoma.   6. Enrolled on ECoG  protocol, Aromasin with placebo versus Aromasin and  Entinostat, 05/08/2015.   7. Stopped Aromasin 04/25/2016 secondary to new liver metastases documented on  CAT scan 04/22/2016.   8. Completed adjuvant radiation treatment to the right chest wall 12/09/2015.  9. Status post right axillary dissection done by Dr. Heart 03/14/2016.   10. Progressive disease documented on CAT scan done 04/22/2016 with new liver  metastases.   11. Started Faslodex Ibrance 05/02/2016.  12. Neutropenia secondary Ibrance. The patient is on 100 mg, 3 weeks on and 1  week off.   13. Rheumatoid arthritis.   14. Worsening metastatic disease documented on CT scans done on 07/25/2016.  15. Started on carboplatin AUC 2 with gemzar given two weeks on, one week off. She is status post three cycle of treatment.   16. Mixed response to treatment documented on CAT scans done 11/15/2016.  17. ON gemzar single agent, status post 3 cycles.    PLAN:  1. We will proceed with treatment today with gemzar weekly 2 weeks on and 1 week off.   2. She will come back to see us in 3 weeks prior to her next cycle of treatment.   3. We sent in a prescription for EMLA cream to be used prior to port access.  4. We will continue Gemzar alone until unacceptable toxicity or disease progression.   5. The patient will continue to hold her methotrexate as she is on active cancer treatment.  6. We will continue to monitor the patient's blood counts, kidney function, and liver function throughout treatment.   7. We will continue Norco 5/325 mg 1 tablet taken every 6 hours as needed for cancer-related pain. She did not need a refill on this prescription today.   8.  I'll repeat her CAT scans prior to return this would be in 3 months follow-up study.       France Chun,  M.D.  1/17/2017

## 2017-01-24 ENCOUNTER — INFUSION (OUTPATIENT)
Dept: ONCOLOGY | Facility: HOSPITAL | Age: 77
End: 2017-01-24

## 2017-01-24 VITALS
TEMPERATURE: 97.6 F | WEIGHT: 172 LBS | BODY MASS INDEX: 30.48 KG/M2 | RESPIRATION RATE: 22 BRPM | HEART RATE: 118 BPM | SYSTOLIC BLOOD PRESSURE: 137 MMHG | HEIGHT: 63 IN | DIASTOLIC BLOOD PRESSURE: 67 MMHG

## 2017-01-24 DIAGNOSIS — R91.8 LUNG NODULES: ICD-10-CM

## 2017-01-24 DIAGNOSIS — C50.111 MALIGNANT NEOPLASM OF CENTRAL PORTION OF RIGHT FEMALE BREAST (HCC): Primary | ICD-10-CM

## 2017-01-24 LAB
ERYTHROCYTE [DISTWIDTH] IN BLOOD BY AUTOMATED COUNT: 20.5 % (ref 11.3–14.5)
HCT VFR BLD AUTO: 31.3 % (ref 34.5–44)
HGB BLD-MCNC: 9.6 G/DL (ref 11.5–15.5)
LYMPHOCYTES # BLD AUTO: 1.5 10*3/MM3 (ref 0.6–4.8)
LYMPHOCYTES NFR BLD AUTO: 24.6 % (ref 24–44)
MCH RBC QN AUTO: 28 PG (ref 27–31)
MCHC RBC AUTO-ENTMCNC: 30.7 G/DL (ref 32–36)
MCV RBC AUTO: 91.2 FL (ref 80–99)
MONOCYTES # BLD AUTO: 0.4 10*3/MM3 (ref 0–1)
MONOCYTES NFR BLD AUTO: 7 % (ref 0–12)
NEUTROPHILS # BLD AUTO: 4.2 10*3/MM3 (ref 1.5–8.3)
NEUTROPHILS NFR BLD AUTO: 68.4 % (ref 41–71)
PLATELET # BLD AUTO: 307 10*3/MM3 (ref 150–450)
PMV BLD AUTO: 6.7 FL (ref 6–12)
RBC # BLD AUTO: 3.43 10*6/MM3 (ref 3.89–5.14)
WBC NRBC COR # BLD: 6.1 10*3/MM3 (ref 3.5–10.8)

## 2017-01-24 PROCEDURE — 25010000002 GEMCITABINE PER 200 MG: Performed by: INTERNAL MEDICINE

## 2017-01-24 PROCEDURE — 25010000002 HEPARIN FLUSH (PORCINE) 100 UNIT/ML SOLUTION: Performed by: INTERNAL MEDICINE

## 2017-01-24 PROCEDURE — 96413 CHEMO IV INFUSION 1 HR: CPT

## 2017-01-24 PROCEDURE — 36415 COLL VENOUS BLD VENIPUNCTURE: CPT

## 2017-01-24 PROCEDURE — 25010000002 DEXAMETHASONE PER 1 MG: Performed by: INTERNAL MEDICINE

## 2017-01-24 PROCEDURE — 96375 TX/PRO/DX INJ NEW DRUG ADDON: CPT

## 2017-01-24 RX ORDER — SODIUM CHLORIDE 0.9 % (FLUSH) 0.9 %
10 SYRINGE (ML) INJECTION AS NEEDED
Status: DISCONTINUED | OUTPATIENT
Start: 2017-01-24 | End: 2017-01-24 | Stop reason: HOSPADM

## 2017-01-24 RX ORDER — SODIUM CHLORIDE 9 MG/ML
250 INJECTION, SOLUTION INTRAVENOUS ONCE
Status: COMPLETED | OUTPATIENT
Start: 2017-01-24 | End: 2017-01-24

## 2017-01-24 RX ORDER — SODIUM CHLORIDE 0.9 % (FLUSH) 0.9 %
10 SYRINGE (ML) INJECTION AS NEEDED
Status: CANCELLED | OUTPATIENT
Start: 2017-01-24

## 2017-01-24 RX ADMIN — DEXAMETHASONE SODIUM PHOSPHATE 12 MG: 4 INJECTION, SOLUTION INTRAMUSCULAR; INTRAVENOUS at 12:58

## 2017-01-24 RX ADMIN — HEPARIN 500 UNITS: 100 SYRINGE at 13:57

## 2017-01-24 RX ADMIN — SODIUM CHLORIDE 250 ML: 9 INJECTION, SOLUTION INTRAVENOUS at 12:59

## 2017-01-24 RX ADMIN — GEMCITABINE 1850 MG: 1 INJECTION, POWDER, LYOPHILIZED, FOR SOLUTION INTRAVENOUS at 13:13

## 2017-01-24 RX ADMIN — Medication 10 ML: at 13:57

## 2017-02-02 ENCOUNTER — HOSPITAL ENCOUNTER (OUTPATIENT)
Dept: CT IMAGING | Facility: HOSPITAL | Age: 77
Discharge: HOME | End: 2017-02-02
Attending: INTERNAL MEDICINE | Admitting: INTERNAL MEDICINE

## 2017-02-02 DIAGNOSIS — C50.111 MALIGNANT NEOPLASM OF CENTRAL PORTION OF RIGHT FEMALE BREAST (HCC): ICD-10-CM

## 2017-02-02 PROCEDURE — 74177 CT ABD & PELVIS W/CONTRAST: CPT

## 2017-02-02 PROCEDURE — 0 IOPAMIDOL 61 % SOLUTION: Performed by: INTERNAL MEDICINE

## 2017-02-02 PROCEDURE — 71260 CT THORAX DX C+: CPT

## 2017-02-02 RX ADMIN — IOPAMIDOL 99 ML: 612 INJECTION, SOLUTION INTRAVENOUS at 13:35

## 2017-02-07 ENCOUNTER — INFUSION (OUTPATIENT)
Dept: ONCOLOGY | Facility: HOSPITAL | Age: 77
End: 2017-02-07

## 2017-02-07 ENCOUNTER — OFFICE VISIT (OUTPATIENT)
Dept: ONCOLOGY | Facility: CLINIC | Age: 77
End: 2017-02-07

## 2017-02-07 ENCOUNTER — EDUCATION (OUTPATIENT)
Dept: ONCOLOGY | Facility: HOSPITAL | Age: 77
End: 2017-02-07

## 2017-02-07 ENCOUNTER — LAB (OUTPATIENT)
Dept: ONCOLOGY | Facility: HOSPITAL | Age: 77
End: 2017-02-07

## 2017-02-07 VITALS
TEMPERATURE: 98.2 F | BODY MASS INDEX: 30.12 KG/M2 | HEART RATE: 111 BPM | RESPIRATION RATE: 20 BRPM | HEIGHT: 63 IN | DIASTOLIC BLOOD PRESSURE: 72 MMHG | WEIGHT: 170 LBS | SYSTOLIC BLOOD PRESSURE: 128 MMHG

## 2017-02-07 VITALS
HEIGHT: 63 IN | DIASTOLIC BLOOD PRESSURE: 60 MMHG | TEMPERATURE: 99.3 F | HEART RATE: 107 BPM | RESPIRATION RATE: 18 BRPM | SYSTOLIC BLOOD PRESSURE: 129 MMHG | WEIGHT: 173.5 LBS | BODY MASS INDEX: 30.74 KG/M2

## 2017-02-07 DIAGNOSIS — C50.111 MALIGNANT NEOPLASM OF CENTRAL PORTION OF RIGHT FEMALE BREAST (HCC): Primary | ICD-10-CM

## 2017-02-07 DIAGNOSIS — C78.7 LIVER METASTASIS: ICD-10-CM

## 2017-02-07 DIAGNOSIS — T45.1X5S ADVERSE REACTION TO ANTINEOPLASTIC DRUG, SEQUELA: ICD-10-CM

## 2017-02-07 DIAGNOSIS — R91.8 LUNG NODULES: ICD-10-CM

## 2017-02-07 LAB
ALBUMIN SERPL-MCNC: 3.7 G/DL (ref 3.2–4.8)
ALBUMIN/GLOB SERPL: 1.3 G/DL (ref 1.5–2.5)
ALP SERPL-CCNC: 118 U/L (ref 25–100)
ALT SERPL W P-5'-P-CCNC: 24 U/L (ref 7–40)
ANION GAP SERPL CALCULATED.3IONS-SCNC: 6 MMOL/L (ref 3–11)
AST SERPL-CCNC: 42 U/L (ref 0–33)
BILIRUB SERPL-MCNC: 0.3 MG/DL (ref 0.3–1.2)
BUN BLD-MCNC: 12 MG/DL (ref 9–23)
BUN/CREAT SERPL: 17.1 (ref 7–25)
CALCIUM SPEC-SCNC: 9.3 MG/DL (ref 8.7–10.4)
CHLORIDE SERPL-SCNC: 103 MMOL/L (ref 99–109)
CO2 SERPL-SCNC: 30 MMOL/L (ref 20–31)
CREAT BLD-MCNC: 0.7 MG/DL (ref 0.6–1.3)
ERYTHROCYTE [DISTWIDTH] IN BLOOD BY AUTOMATED COUNT: 20.7 % (ref 11.3–14.5)
GFR SERPL CREATININE-BSD FRML MDRD: 98 ML/MIN/1.73
GLOBULIN UR ELPH-MCNC: 2.9 GM/DL
GLUCOSE BLD-MCNC: 109 MG/DL (ref 70–100)
HCT VFR BLD AUTO: 31.8 % (ref 34.5–44)
HGB BLD-MCNC: 9.8 G/DL (ref 11.5–15.5)
LYMPHOCYTES # BLD AUTO: 1.9 10*3/MM3 (ref 0.6–4.8)
LYMPHOCYTES NFR BLD AUTO: 23.4 % (ref 24–44)
MCH RBC QN AUTO: 27.2 PG (ref 27–31)
MCHC RBC AUTO-ENTMCNC: 30.8 G/DL (ref 32–36)
MCV RBC AUTO: 88.4 FL (ref 80–99)
MONOCYTES # BLD AUTO: 0.1 10*3/MM3 (ref 0–1)
MONOCYTES NFR BLD AUTO: 1.1 % (ref 0–12)
NEUTROPHILS # BLD AUTO: 6 10*3/MM3 (ref 1.5–8.3)
NEUTROPHILS NFR BLD AUTO: 75.5 % (ref 41–71)
PLATELET # BLD AUTO: 430 10*3/MM3 (ref 150–450)
PMV BLD AUTO: 7.5 FL (ref 6–12)
POTASSIUM BLD-SCNC: 4.5 MMOL/L (ref 3.5–5.5)
PROT SERPL-MCNC: 6.6 G/DL (ref 5.7–8.2)
RBC # BLD AUTO: 3.6 10*6/MM3 (ref 3.89–5.14)
SODIUM BLD-SCNC: 139 MMOL/L (ref 132–146)
WBC NRBC COR # BLD: 8 10*3/MM3 (ref 3.5–10.8)

## 2017-02-07 PROCEDURE — 99215 OFFICE O/P EST HI 40 MIN: CPT | Performed by: INTERNAL MEDICINE

## 2017-02-07 PROCEDURE — 80053 COMPREHEN METABOLIC PANEL: CPT | Performed by: INTERNAL MEDICINE

## 2017-02-07 PROCEDURE — 25010000002 HEPARIN FLUSH (PORCINE) 100 UNIT/ML SOLUTION: Performed by: INTERNAL MEDICINE

## 2017-02-07 PROCEDURE — 36591 DRAW BLOOD OFF VENOUS DEVICE: CPT

## 2017-02-07 RX ORDER — SODIUM CHLORIDE 0.9 % (FLUSH) 0.9 %
10 SYRINGE (ML) INJECTION AS NEEDED
Status: DISCONTINUED | OUTPATIENT
Start: 2017-02-07 | End: 2017-02-07 | Stop reason: HOSPADM

## 2017-02-07 RX ORDER — SODIUM CHLORIDE 0.9 % (FLUSH) 0.9 %
10 SYRINGE (ML) INJECTION AS NEEDED
Status: CANCELLED | OUTPATIENT
Start: 2017-02-07

## 2017-02-07 RX ADMIN — Medication 10 ML: at 14:07

## 2017-02-07 RX ADMIN — HEPARIN 500 UNITS: 100 SYRINGE at 14:07

## 2017-02-07 NOTE — PROGRESS NOTES
DATE OF VISIT: 2/7/2017    REASON FOR VISIT: Followup for   1. Breast cancer:   a) Initially presenting as stage IIB disease, T2N1M0, estrogen receptor  strongly positive, progesterone receptor intermediate, HER-2/jean marie negative.   b) Status post right mastectomy followed by adjuvant chemotherapy using  Taxotere and Cytoxan 4 cycles.   c) Took Arimidex 1 mg p.o. daily from 01/10/2013 until 04/28/2015.   2. Locally relapsed disease with right chest wall as well as axillary lymph  nodes biopsy proven metastatic disease done 04/17/2015.  3. Enrolled on clinical trial [employer]  randomizing patients for 2nd line  hormone treatment with Aromasin with placebo versus Aromasin with Entinostat.   4. Currently on Aromasin single agent.   5. Completed adjuvant radiation treatment by Dr. Benavides to the right chest  wall as well as right axillary lymph nodes 12/09/2015.   6. Currently metastatic disease with new liver metastases documented on CAT  scan 04/22/2016.   7. Started Faslodex Ibrance 05/02/2016.   8. Switched to Carbo/gemzar weekly secondary to progressive disease from 07/22/2016 till 10/11/2016.  9. Started gemzar single agent weekly 2 weeks on and 1 week off 10/25/2016.     HISTORY OF PRESENT ILLNESS: The patient is a very pleasant 75-year-old female  with past medical history significant for invasive ductal carcinoma of the  right breast diagnosed 06/21/2012. The patient is status post right mastectomy  on 07/13/2012 with lymph node dissection, tumor greatest dimension was 2.5 cm,  2 out of 8 positive axial lymph nodes, clear surgical margins. The patient was  started on adjuvant chemotherapy using Taxotere and Cytoxan on 10/08/2012. The  patient completed her treatment on 12/10/2012. The patient was started on  adjuvant hormone treatment using Arimidex 1 mg p.o. daily 01/10/2013. The  patient presented with right chest wall mass. Biopsy done on 04/17/2015  revealed relapsed high-grade ductal carcinoma. She had CAT  scan of the chest,  abdomen, and pelvis that revealed disease in the right chest wall as well as  right axilla. The patient was enrolled with [employer]Co[employer]  Aromasin with or without  Entinostat on 05/08/2015. The patient had progressive disease documented on  scans done 04/22/2016 with liver metastases. The patient was started on  Faslodex Ibrance on 05/02/2016. Repeat CT scan done 7/22/2016 showed continued progression of disease, and the patient's treatment was changed to carbo/gemzar 2 weeks on, 1 week off. Completed 5 cycles on 10/11/2016. shw started Gemzar single agent 10/25/2016. The patient is here today for cycle #6, day 1.      SUBJECTIVE: The patient has been doing fairly well. She notes that her appetite has declined some. She has not lost any significant amount of weight, but notes she is eating less. She has no nausea or vomiting. She has not had much pain or discomfort, with only rare use of her Norco. She denies cough or shortness of breath.     PAST MEDICAL HISTORY/SOCIAL HISTORY/FAMILY HISTORY: Unchanged from my prior documentation done on 10/2/2012.     Review of Systems   Constitutional: Positive for appetite change and fatigue. Negative for activity change, chills, fever and unexpected weight change.   HENT: Negative for hearing loss, mouth sores, nosebleeds, sore throat and trouble swallowing.    Eyes: Negative for visual disturbance.   Respiratory: Negative for chest tightness, shortness of breath and wheezing.    Cardiovascular: Negative for chest pain, palpitations and leg swelling.   Gastrointestinal: Negative for abdominal distention, abdominal pain, blood in stool, constipation, diarrhea, nausea, rectal pain and vomiting.   Endocrine: Negative for cold intolerance and heat intolerance.   Genitourinary: Negative for difficulty urinating, dysuria, frequency and urgency.   Musculoskeletal: Positive for arthralgias. Negative for back pain, gait problem and myalgias.   Skin: Negative for rash.    Neurological: Negative for dizziness, tremors, syncope, weakness, light-headedness, numbness and headaches.   Hematological: Negative for adenopathy. Does not bruise/bleed easily.   Psychiatric/Behavioral: Negative for confusion, sleep disturbance and suicidal ideas. The patient is not nervous/anxious.          Current Outpatient Prescriptions:   •  amLODIPine (NORVASC) 10 MG tablet, Take 10 mg by mouth daily., Disp: , Rfl:   •  hydrALAZINE (APRESOLINE) 25 MG tablet, Take 25 mg by mouth 3 (three) times a day., Disp: , Rfl:   •  HYDROcodone-acetaminophen (NORCO) 5-325 MG per tablet, Take 1 tablet by mouth Every 8 (Eight) Hours As Needed for moderate pain (4-6)., Disp: 90 tablet, Rfl: 0  •  lidocaine-prilocaine (EMLA) 2.5-2.5 % cream, Apply  topically As Needed for mild pain (1-3) (prior to port access)., Disp: 30 g, Rfl: 2  •  Multiple Vitamin (TAB-A-TESS) tablet, Take 1 tablet by mouth daily., Disp: , Rfl: 0  •  quinapril (ACCUPRIL) 10 MG tablet, Take 10 mg by mouth every night., Disp: , Rfl:   •  VENTOLIN  (90 BASE) MCG/ACT inhaler, Inhale 2 puffs every 6 (six) hours as needed., Disp: , Rfl: 0    PHYSICAL EXAMINATION:   There were no vitals taken for this visit.   ECOG Performance Status: 1  General Appearance:  alert, cooperative, no apparent distress and appears stated age   Neurologic/Psychiatric: A&O x 3, gait steady, appropriate affect, strength 5/5 in all muscle groups   HEENT:  Normocephalic, without obvious abnormality, mucous membranes moist   Neck: Supple, symmetrical, trachea midline, no adenopathy;  No thyromegaly, masses, or tenderness   Lungs:   Clear to auscultation bilaterally; respirations regular, even, and unlabored bilaterally   Heart:  Regular rate and rhythm, no murmurs appreciated   Abdomen:   Soft, non-tender, non-distended and no organomegaly   Lymph nodes: No cervical, supraclavicular, inguinal or axillary adenopathy noted   Extremities: Normal, atraumatic; no clubbing, cyanosis,  or edema    Skin: No rashes, ulcers, or suspicious lesions noted     No visits with results within 2 Week(s) from this visit.  Latest known visit with results is:    Infusion on 01/24/2017   Component Date Value Ref Range Status   • WBC 01/24/2017 6.10  3.50 - 10.80 10*3/mm3 Final   • RBC 01/24/2017 3.43* 3.89 - 5.14 10*6/mm3 Final   • Hemoglobin 01/24/2017 9.6* 11.5 - 15.5 g/dL Final   • Hematocrit 01/24/2017 31.3* 34.5 - 44.0 % Final   • RDW 01/24/2017 20.5* 11.3 - 14.5 % Final   • MCV 01/24/2017 91.2  80.0 - 99.0 fL Final   • MCH 01/24/2017 28.0  27.0 - 31.0 pg Final   • MCHC 01/24/2017 30.7* 32.0 - 36.0 g/dL Final   • MPV 01/24/2017 6.7  6.0 - 12.0 fL Final   • Platelets 01/24/2017 307  150 - 450 10*3/mm3 Final    Verified by repeat analysis.    • Neutrophil % 01/24/2017 68.4  41.0 - 71.0 % Final   • Lymphocyte % 01/24/2017 24.6  24.0 - 44.0 % Final   • Monocyte % 01/24/2017 7.0  0.0 - 12.0 % Final   • Neutrophils, Absolute 01/24/2017 4.20  1.50 - 8.30 10*3/mm3 Final   • Lymphocytes, Absolute 01/24/2017 1.50  0.60 - 4.80 10*3/mm3 Final   • Monocytes, Absolute 01/24/2017 0.40  0.00 - 1.00 10*3/mm3 Final        Ct Chest With Contrast    Result Date: 2/3/2017  Narrative: EXAMINATION: CT CHEST W CONTRAST-, CT ABDOMEN AND PELVIS W CONTRAST-  INDICATION: C50.111-Malignant neoplasm of central portion of right female breast; followup right breast cancer.  TECHNIQUE: Multiaxial CT imaging was obtained of the chest, abdomen and pelvis following the administration of intravenous contrast.  The radiation dose reduction device was turned on for each scan per the ALARA (As Low as Reasonably Achievable) protocol.  COMPARISON: 11/15/2016.  FINDINGS: CHEST: Thyroid is heterogeneous in appearance. There is no change seen in the size of the left axillary adenopathy. The largest lymph node measures today 3.7 x 1.7 cm.  There is a smaller lymph node measuring 1.8 x 1.2 cm. The lymph nodes overall have not significantly changed in  size when compared to the prior study.  A surgical clip is seen within the right axillary region. There is a small postoperative seroma or hematoma which is stable on the right. Patient again is status post right mastectomy. Vascular calcification is seen within the thoracic aorta. There is a small precarinal lymph node identified today measuring 1.8 cm and previously measuring 1.9 cm. The overall size and appearance is stable. There is no bulky hilar adenopathy. The cardiac chambers within normal limits. No pericardial effusion.  The lung parenchyma reveals some scarring identified anteriorly. There is evidence of a small pulmonary nodule seen anteriorly within the left lung which is slightly increasing in size when compared to the prior study today measuring approximately 9 mm and previously measuring 7 mm. The remainder of the lung fields are clear. Degenerative changes seen within the spine.  ABDOMEN: The liver is heterogeneous in appearance with multiple low-density lesions suggesting hepatic metastatic disease. These low-density lesions appear to have slightly increased in size when compared to the prior study for example a larger lesion seen in the periphery of the inferior aspect of the right lobe liver today measures 4.7 cm in its largest dimension and previously measured 3.2 cm. The kidneys and adrenal glands are both unremarkable. The spleen is unremarkable. The pancreas is within normal limits. No abdominal or retroperitoneal lymphadenopathy. Vascular calcification is seen within the abdominal aorta and iliac vessels. No free fluid or free air.  PELVIS: Pelvic organs are unremarkable. The pelvic portion of the gastrointestinal tract is within normal limits. No pelvic adenopathy. No abnormal mass or fluid collection is identified. Bony structures reveal minimal degenerative changes identified within the spine and pelvis. Degenerative change is identified as well within the thoracic spine.      Impression:  Slight interval progression of disease with interval increase seen in size of the liver lesions. There is also slight interval increase seen in size of the pulmonary nodule within the anterior medial aspect of the left upper lobe. The lymphadenopathy within the left axillary region is stable.  D:  02/03/2017 E:  02/03/2017  This report was finalized on 2/3/2017 11:24 AM by Dr. Gilda Lambert MD.      Ct Abdomen Pelvis With Contrast    Result Date: 2/3/2017  Narrative: EXAMINATION: CT CHEST W CONTRAST-, CT ABDOMEN AND PELVIS W CONTRAST-  INDICATION: C50.111-Malignant neoplasm of central portion of right female breast; followup right breast cancer.  TECHNIQUE: Multiaxial CT imaging was obtained of the chest, abdomen and pelvis following the administration of intravenous contrast.  The radiation dose reduction device was turned on for each scan per the ALARA (As Low as Reasonably Achievable) protocol.  COMPARISON: 11/15/2016.  FINDINGS: CHEST: Thyroid is heterogeneous in appearance. There is no change seen in the size of the left axillary adenopathy. The largest lymph node measures today 3.7 x 1.7 cm.  There is a smaller lymph node measuring 1.8 x 1.2 cm. The lymph nodes overall have not significantly changed in size when compared to the prior study.  A surgical clip is seen within the right axillary region. There is a small postoperative seroma or hematoma which is stable on the right. Patient again is status post right mastectomy. Vascular calcification is seen within the thoracic aorta. There is a small precarinal lymph node identified today measuring 1.8 cm and previously measuring 1.9 cm. The overall size and appearance is stable. There is no bulky hilar adenopathy. The cardiac chambers within normal limits. No pericardial effusion.  The lung parenchyma reveals some scarring identified anteriorly. There is evidence of a small pulmonary nodule seen anteriorly within the left lung which is slightly increasing in  size when compared to the prior study today measuring approximately 9 mm and previously measuring 7 mm. The remainder of the lung fields are clear. Degenerative changes seen within the spine.  ABDOMEN: The liver is heterogeneous in appearance with multiple low-density lesions suggesting hepatic metastatic disease. These low-density lesions appear to have slightly increased in size when compared to the prior study for example a larger lesion seen in the periphery of the inferior aspect of the right lobe liver today measures 4.7 cm in its largest dimension and previously measured 3.2 cm. The kidneys and adrenal glands are both unremarkable. The spleen is unremarkable. The pancreas is within normal limits. No abdominal or retroperitoneal lymphadenopathy. Vascular calcification is seen within the abdominal aorta and iliac vessels. No free fluid or free air.  PELVIS: Pelvic organs are unremarkable. The pelvic portion of the gastrointestinal tract is within normal limits. No pelvic adenopathy. No abnormal mass or fluid collection is identified. Bony structures reveal minimal degenerative changes identified within the spine and pelvis. Degenerative change is identified as well within the thoracic spine.      Impression: Slight interval progression of disease with interval increase seen in size of the liver lesions. There is also slight interval increase seen in size of the pulmonary nodule within the anterior medial aspect of the left upper lobe. The lymphadenopathy within the left axillary region is stable.  D:  02/03/2017 E:  02/03/2017  This report was finalized on 2/3/2017 11:24 AM by Dr. Gilda Lambert MD.        ASSESSMENT: The patient is a very pleasant 75-year-old female with right breast  cancer.     PROBLEM LIST:  1. T2N1M0 invasive ductal carcinoma of the right breast, tumor greatest  dimension 2.5 cm, estrogen receptor strongly positive, progesterone receptor  intermediate, HER-2/jean marie negative by IHC score  of 0, 2 out of 8 positive  axillary lymph nodes.   2. Status post right mastectomy with axillary lymph node dissection done by  Dr. Heart on 07/30/2012.   3. Status post 4 cycles of adjuvant chemotherapy using Taxotere and Cytoxan  from 10/08/2012 until 12/10/2012.   4. Took Arimidex 1 mg p.o. daily from 01/10/2013 until 04/28/2015.   5. Relapsed disease documented on CAT scan of the chest, abdomen, and pelvis  done 04/27/2015. Revealed right chest wall mass as well as right axillary  lymphadenopathy. Biopsy done 04/17/2015 revealed invasive ductal carcinoma.   6. Enrolled on [employer]CoG  protocol, Aromasin with placebo versus Aromasin and  Entinostat, 05/08/2015.   7. Stopped Aromasin 04/25/2016 secondary to new liver metastases documented on  CAT scan 04/22/2016.   8. Completed adjuvant radiation treatment to the right chest wall 12/09/2015.  9. Status post right axillary dissection done by Dr. Heart 03/14/2016.   10. Progressive disease documented on CAT scan done 04/22/2016 with new liver  metastases.   11. Started Faslodex Ibrance 05/02/2016.  12. Neutropenia secondary Ibrance. The patient is on 100 mg, 3 weeks on and 1  week off.   13. Rheumatoid arthritis.   14. Worsening metastatic disease documented on CT scans done on 07/25/2016.  15. Started on carboplatin AUC 2 with gemzar given two weeks on, one week off. She is status post three cycle of treatment.   16. Mixed response to treatment documented on CAT scans done 11/15/2016.  17. On gemzar single agent, status post 5 cycles.    PLAN:  1. I reviewed the CAT scan results with the patient. I reviewed the images myself. I told the patient that there is evidence of slight disease progression in the liver as well as the pulmonary nodule.   2. We will change the patient's treatment to Adriamycin given IV weekly. This will begin in 2 weeks. I reviewed potential side effects with the patient and she received formal education from our oncology pharmacist.   3. We will  get cardiac echo prior to initiating treatment with adriamycin.   4. We sent in a prescription for EMLA cream to be used prior to port access.  5. The patient will continue to hold her methotrexate as she is on active cancer treatment.  6. We will continue to monitor the patient's blood counts, kidney function, and liver function throughout treatment.   7. We will continue Norco 5/325 mg 1 tablet taken every 6 hours as needed for cancer-related pain. She did not need a refill on this prescription today.       Scribed for France Chun MD by LIDIA Chirinos. 2/7/2017  1:24 PM  France Chun MD  2/7/2017   I have reviewed the notes, assessments, and/or procedures performed by LIDIA Chirinos, I concur with her/his documentation of Nava Cline.

## 2017-02-07 NOTE — CONSULTS
Outpatient Infusion • [address] [facility] • [address] • [place] [id number] • [phone]      CHEMOTHERAPY EDUCATION SHEET    NAME:  Nava Cline      : 1940           DATE: 17    Booklets Given: Chemotherapy and You []  Eating Hints []    Sexuality/Fertility Books []     Chemotherapy/Biotherapy Education Sheets: (list all that apply)  Adriamycin                                                                                                                                                                Chemotherapy Regimen:  Adriamycin Weekly     TOPICS EDUCATION PROVIDED EDUCATION REINFORCED COMMENTS   ANEMIA:  role of RBC, cause, s/s, ways to manage, role of transfusion [] []    THROMBOCYTOPENIA:  role of platelet, cause, s/s, ways to prevent bleeding, things to avoid, when to seek help [] []    NEUTROPENIA:  role of WBC, cause, infection precautions, s/s of infection, when to call MD [x] [] Explained the risk of infection and the importance of calling the provider office if she developed fever.   NUTRITION & APPETITE CHANGES:  importance of maintaining healthy diet & weight, ways to manage to improve intake, dietary consult, exercise regimen [] []    DIARRHEA:  causes, s/s of dehydration, ways to manage, dietary changes, when to call MD [] []    CONSTIPATION:  causes, ways to manage, dietary changes, when to call MD [] []    NAUSEA & VOMITING:  cause, use of antiemetics, dietary changes, when to call MD [x] []    MOUTH SORES:  causes, oral care, ways to manage [x] []    ALOPECIA:  cause, ways to manage, resources [x] []    INFERTILITY & SEXUALITY:  causes, fertility preservation options, sexuality changes, ways to manage, importance of birth control [] []    NERVOUS SYSTEM CHANGES:  causes, s/s, neuropathies, cognitive changes, ways to manage [] []    PAIN:  causes, ways to manage [] [] ????   SKIN & NAIL CHANGES:  cause, s/s, ways to manage [] []    ORGAN TOXICITIES:  cause, s/s, need for  diagnostic tests, labs, when to notify MD [x] [] Explained the risk of cardiotoxicity and the importance of ECHO to monitor heart function.    SURVIVORSHIP:  distress, distress assessment, secondary malignancies, early/late effects, follow-up, social issues, social support [] []    HOME CARE:  use of spill kits, storing of PO chemo, how to manage bodily fluids [] []    MISCELLANEOUS:  drug interactions, administration, vesicant, et [x] [] Explained urine discoloration as a  common side effect of this medication.          Notes:  Talked to the patient and explained the side effects associated with this medication. Pt was very understanding and pleasant to interact with.   Thank you,  Jessica Caldwell, PharmD.  2/7/2017  2:48 PM

## 2017-02-09 ENCOUNTER — HOSPITAL ENCOUNTER (OUTPATIENT)
Dept: CARDIOLOGY | Facility: HOSPITAL | Age: 77
Discharge: HOME | End: 2017-02-09
Admitting: NURSE PRACTITIONER

## 2017-02-09 VITALS
BODY MASS INDEX: 29.37 KG/M2 | DIASTOLIC BLOOD PRESSURE: 80 MMHG | HEIGHT: 64 IN | SYSTOLIC BLOOD PRESSURE: 140 MMHG | WEIGHT: 172 LBS

## 2017-02-09 DIAGNOSIS — T45.1X5S ADVERSE REACTION TO ANTINEOPLASTIC DRUG, SEQUELA: ICD-10-CM

## 2017-02-09 LAB
BH CV ECHO MEAS - AO MAX PG (FULL): 23 MMHG
BH CV ECHO MEAS - AO MAX PG: 19.4 MMHG
BH CV ECHO MEAS - AO MEAN PG (FULL): 6.5 MMHG
BH CV ECHO MEAS - AO MEAN PG: 11 MMHG
BH CV ECHO MEAS - AO ROOT AREA (BSA CORRECTED): 1.6
BH CV ECHO MEAS - AO ROOT AREA: 5.7 CM^2
BH CV ECHO MEAS - AO ROOT DIAM: 2.7 CM
BH CV ECHO MEAS - AO V2 MAX: 219.8 CM/SEC
BH CV ECHO MEAS - AO V2 MEAN: 135.5 CM/SEC
BH CV ECHO MEAS - AO V2 VTI: 42.6 CM
BH CV ECHO MEAS - AVA(I,A): 1.6 CM^2
BH CV ECHO MEAS - AVA(I,D): 1.6 CM^2
BH CV ECHO MEAS - AVA(V,A): 1.6 CM^2
BH CV ECHO MEAS - AVA(V,D): 1.6 CM^2
BH CV ECHO MEAS - BSA(HAYCOCK): 1.8 M^2
BH CV ECHO MEAS - BSA: 1.7 M^2
BH CV ECHO MEAS - BZI_BMI: 26.9 KILOGRAMS/M^2
BH CV ECHO MEAS - BZI_METRIC_HEIGHT: 160 CM
BH CV ECHO MEAS - BZI_METRIC_WEIGHT: 68.9 KG
BH CV ECHO MEAS - EDV(CUBED): 122 ML
BH CV ECHO MEAS - EDV(TEICH): 116.1 ML
BH CV ECHO MEAS - EF(CUBED): 84.2 %
BH CV ECHO MEAS - EF(TEICH): 77.1 %
BH CV ECHO MEAS - ESV(CUBED): 19.2 ML
BH CV ECHO MEAS - ESV(TEICH): 26.5 ML
BH CV ECHO MEAS - FS: 46 %
BH CV ECHO MEAS - IVS/LVPW: 0.71
BH CV ECHO MEAS - IVSD: 0.65 CM
BH CV ECHO MEAS - LA DIMENSION: 2.9 CM
BH CV ECHO MEAS - LA/AO: 1.1
BH CV ECHO MEAS - LV MASS(C)D: 130.6 GRAMS
BH CV ECHO MEAS - LV MASS(C)DI: 75.9 GRAMS/M^2
BH CV ECHO MEAS - LV MAX PG: 4.9 MMHG
BH CV ECHO MEAS - LV MEAN PG: 2.5 MMHG
BH CV ECHO MEAS - LV V1 MAX: 110.5 CM/SEC
BH CV ECHO MEAS - LV V1 MEAN: 65.5 CM/SEC
BH CV ECHO MEAS - LV V1 VTI: 21.4 CM
BH CV ECHO MEAS - LVIDD: 5 CM
BH CV ECHO MEAS - LVIDS: 2.7 CM
BH CV ECHO MEAS - LVOT AREA (M): 3.1 CM^2
BH CV ECHO MEAS - LVOT AREA: 3.1 CM^2
BH CV ECHO MEAS - LVOT DIAM: 2 CM
BH CV ECHO MEAS - LVPWD: 0.92 CM
BH CV ECHO MEAS - MV A MAX VEL: 120 CM/SEC
BH CV ECHO MEAS - MV E MAX VEL: 76.8 CM/SEC
BH CV ECHO MEAS - MV E/A: 0.64
BH CV ECHO MEAS - PA MAX PG: 10.7 MMHG
BH CV ECHO MEAS - PA V2 MAX: 163.5 CM/SEC
BH CV ECHO MEAS - RAP SYSTOLE: 8 MMHG
BH CV ECHO MEAS - RVDD: 2 CM
BH CV ECHO MEAS - RVSP: 51 MMHG
BH CV ECHO MEAS - SI(AO): 141.7 ML/M^2
BH CV ECHO MEAS - SI(CUBED): 59.7 ML/M^2
BH CV ECHO MEAS - SI(LVOT): 39.1 ML/M^2
BH CV ECHO MEAS - SI(TEICH): 52 ML/M^2
BH CV ECHO MEAS - SV(AO): 243.9 ML
BH CV ECHO MEAS - SV(CUBED): 102.8 ML
BH CV ECHO MEAS - SV(LVOT): 67.2 ML
BH CV ECHO MEAS - SV(TEICH): 89.5 ML
BH CV ECHO MEAS - TAPSE (>1.6): 2.2 CM2
BH CV ECHO MEAS - TR MAX VEL: 322.2 CM/SEC
BH CV ECHO MEAS - TV MAX PG: 2.1 MMHG
BH CV ECHO MEAS - TV V2 MAX: 73.1 CM/SEC
BH CV XLRA - RV BASE: 2.7 CM
BH CV XLRA - RV LENGTH: 5.9 CM
BH CV XLRA - RV MID: 2.4 CM
BH CV XLRA - TDI S': 24.8 CM/SEC
LV EF 2D ECHO EST: 60 %

## 2017-02-09 PROCEDURE — 93306 TTE W/DOPPLER COMPLETE: CPT | Performed by: INTERNAL MEDICINE

## 2017-02-09 PROCEDURE — 93306 TTE W/DOPPLER COMPLETE: CPT

## 2017-02-14 ENCOUNTER — DOCUMENTATION (OUTPATIENT)
Dept: NUTRITION | Facility: HOSPITAL | Age: 77
End: 2017-02-14

## 2017-02-14 ENCOUNTER — INFUSION (OUTPATIENT)
Dept: ONCOLOGY | Facility: HOSPITAL | Age: 77
End: 2017-02-14

## 2017-02-14 ENCOUNTER — APPOINTMENT (OUTPATIENT)
Dept: ONCOLOGY | Facility: HOSPITAL | Age: 77
End: 2017-02-14

## 2017-02-14 VITALS
HEART RATE: 104 BPM | WEIGHT: 168 LBS | HEIGHT: 64 IN | RESPIRATION RATE: 20 BRPM | TEMPERATURE: 98.5 F | SYSTOLIC BLOOD PRESSURE: 131 MMHG | BODY MASS INDEX: 28.68 KG/M2 | DIASTOLIC BLOOD PRESSURE: 63 MMHG

## 2017-02-14 DIAGNOSIS — C50.111 MALIGNANT NEOPLASM OF CENTRAL PORTION OF RIGHT FEMALE BREAST (HCC): ICD-10-CM

## 2017-02-14 DIAGNOSIS — R91.8 LUNG NODULES: ICD-10-CM

## 2017-02-14 DIAGNOSIS — C50.111 MALIGNANT NEOPLASM OF CENTRAL PORTION OF RIGHT FEMALE BREAST (HCC): Primary | ICD-10-CM

## 2017-02-14 LAB
ALBUMIN SERPL-MCNC: 3.5 G/DL (ref 3.2–4.8)
ALBUMIN/GLOB SERPL: 1 G/DL (ref 1.5–2.5)
ALP SERPL-CCNC: 133 U/L (ref 25–100)
ALT SERPL W P-5'-P-CCNC: 27 U/L (ref 7–40)
ANION GAP SERPL CALCULATED.3IONS-SCNC: 5 MMOL/L (ref 3–11)
AST SERPL-CCNC: 56 U/L (ref 0–33)
BILIRUB SERPL-MCNC: 0.3 MG/DL (ref 0.3–1.2)
BUN BLD-MCNC: 13 MG/DL (ref 9–23)
BUN/CREAT SERPL: 16.3 (ref 7–25)
CALCIUM SPEC-SCNC: 9.2 MG/DL (ref 8.7–10.4)
CHLORIDE SERPL-SCNC: 103 MMOL/L (ref 99–109)
CO2 SERPL-SCNC: 32 MMOL/L (ref 20–31)
CREAT BLD-MCNC: 0.8 MG/DL (ref 0.6–1.3)
ERYTHROCYTE [DISTWIDTH] IN BLOOD BY AUTOMATED COUNT: 20.8 % (ref 11.3–14.5)
GFR SERPL CREATININE-BSD FRML MDRD: 84 ML/MIN/1.73
GLOBULIN UR ELPH-MCNC: 3.4 GM/DL
GLUCOSE BLD-MCNC: 92 MG/DL (ref 70–100)
HCT VFR BLD AUTO: 31 % (ref 34.5–44)
HGB BLD-MCNC: 9.8 G/DL (ref 11.5–15.5)
LYMPHOCYTES # BLD AUTO: 2.5 10*3/MM3 (ref 0.6–4.8)
LYMPHOCYTES NFR BLD AUTO: 28.8 % (ref 24–44)
MCH RBC QN AUTO: 27.6 PG (ref 27–31)
MCHC RBC AUTO-ENTMCNC: 31.6 G/DL (ref 32–36)
MCV RBC AUTO: 87.3 FL (ref 80–99)
MONOCYTES # BLD AUTO: 0.3 10*3/MM3 (ref 0–1)
MONOCYTES NFR BLD AUTO: 3.7 % (ref 0–12)
NEUTROPHILS # BLD AUTO: 5.9 10*3/MM3 (ref 1.5–8.3)
NEUTROPHILS NFR BLD AUTO: 67.5 % (ref 41–71)
PLATELET # BLD AUTO: 480 10*3/MM3 (ref 150–450)
PMV BLD AUTO: 6.9 FL (ref 6–12)
POTASSIUM BLD-SCNC: 4.3 MMOL/L (ref 3.5–5.5)
PROT SERPL-MCNC: 6.9 G/DL (ref 5.7–8.2)
RBC # BLD AUTO: 3.55 10*6/MM3 (ref 3.89–5.14)
SODIUM BLD-SCNC: 140 MMOL/L (ref 132–146)
WBC NRBC COR # BLD: 8.8 10*3/MM3 (ref 3.5–10.8)

## 2017-02-14 PROCEDURE — 25010000002 DEXAMETHASONE PER 1 MG: Performed by: NURSE PRACTITIONER

## 2017-02-14 PROCEDURE — 25010000002 PALONOSETRON PER 25 MCG: Performed by: NURSE PRACTITIONER

## 2017-02-14 PROCEDURE — 25010000002 DOXORUBICIN PER 10 MG: Performed by: NURSE PRACTITIONER

## 2017-02-14 PROCEDURE — 96409 CHEMO IV PUSH SNGL DRUG: CPT

## 2017-02-14 PROCEDURE — 96411 CHEMO IV PUSH ADDL DRUG: CPT

## 2017-02-14 PROCEDURE — 25010000002 HEPARIN FLUSH (PORCINE) 100 UNIT/ML SOLUTION: Performed by: INTERNAL MEDICINE

## 2017-02-14 PROCEDURE — 96367 TX/PROPH/DG ADDL SEQ IV INF: CPT

## 2017-02-14 PROCEDURE — 96375 TX/PRO/DX INJ NEW DRUG ADDON: CPT

## 2017-02-14 RX ORDER — PALONOSETRON 0.05 MG/ML
0.25 INJECTION, SOLUTION INTRAVENOUS ONCE
Status: CANCELLED | OUTPATIENT
Start: 2017-02-14

## 2017-02-14 RX ORDER — SODIUM CHLORIDE 0.9 % (FLUSH) 0.9 %
10 SYRINGE (ML) INJECTION AS NEEDED
Status: CANCELLED | OUTPATIENT
Start: 2017-02-14

## 2017-02-14 RX ORDER — SODIUM CHLORIDE 9 MG/ML
250 INJECTION, SOLUTION INTRAVENOUS ONCE
Status: CANCELLED | OUTPATIENT
Start: 2017-02-14

## 2017-02-14 RX ORDER — DOXORUBICIN HYDROCHLORIDE 2 MG/ML
20 INJECTION, SOLUTION INTRAVENOUS ONCE
Status: COMPLETED | OUTPATIENT
Start: 2017-02-14 | End: 2017-02-14

## 2017-02-14 RX ORDER — SODIUM CHLORIDE 9 MG/ML
250 INJECTION, SOLUTION INTRAVENOUS ONCE
Status: COMPLETED | OUTPATIENT
Start: 2017-02-14 | End: 2017-02-14

## 2017-02-14 RX ORDER — PALONOSETRON 0.05 MG/ML
0.25 INJECTION, SOLUTION INTRAVENOUS ONCE
Status: COMPLETED | OUTPATIENT
Start: 2017-02-14 | End: 2017-02-14

## 2017-02-14 RX ORDER — DOXORUBICIN HYDROCHLORIDE 2 MG/ML
20 INJECTION, SOLUTION INTRAVENOUS ONCE
Status: CANCELLED | OUTPATIENT
Start: 2017-02-14

## 2017-02-14 RX ADMIN — HEPARIN 500 UNITS: 100 SYRINGE at 14:12

## 2017-02-14 RX ADMIN — DEXAMETHASONE SODIUM PHOSPHATE 12 MG: 4 INJECTION, SOLUTION INTRAMUSCULAR; INTRAVENOUS at 13:13

## 2017-02-14 RX ADMIN — PALONOSETRON HYDROCHLORIDE 0.25 MG: 0.25 INJECTION INTRAVENOUS at 13:12

## 2017-02-14 RX ADMIN — DOXORUBICIN HYDROCHLORIDE 36 MG: 2 INJECTION, SOLUTION INTRAVENOUS at 13:50

## 2017-02-14 RX ADMIN — SODIUM CHLORIDE 250 ML: 9 INJECTION, SOLUTION INTRAVENOUS at 13:12

## 2017-02-14 NOTE — PROGRESS NOTES
Outpatient Oncology Nutrition Services    Patient Name:  Nava Cline  YOB: 1940  MRN: [id number]  Admit Date:  (Not on file)    Follow up visit with patient during her chemotherapy infusion appointment.  Note patient starting Adriamycin-weekly due to progressive disease.  Patient is without new nutritional complaints; she states her appetite continues to vary day to day.      Briefly reviewed the importance of good nutrition during her treatments.  Encouraged good oral intake focusing on increased protein intake and fluids to aid with hydration.  Also emphasized the importance of good oral care to help prevent mouth sores.      She denies having nutritional questions at this time.  RD's contact information provided and encouraged her to call if questions arise.  She voiced understanding of information discussed.  Will follow up as needed.  RD available to assist prn.  Thanks,    Electronically signed by:  Gilda Dwyer RD  02/14/17 2:30 PM

## 2017-02-16 DIAGNOSIS — C50.111 MALIGNANT NEOPLASM OF CENTRAL PORTION OF RIGHT FEMALE BREAST (HCC): ICD-10-CM

## 2017-02-21 ENCOUNTER — INFUSION (OUTPATIENT)
Dept: ONCOLOGY | Facility: HOSPITAL | Age: 77
End: 2017-02-21

## 2017-02-21 ENCOUNTER — OFFICE VISIT (OUTPATIENT)
Dept: ONCOLOGY | Facility: CLINIC | Age: 77
End: 2017-02-21

## 2017-02-21 VITALS
TEMPERATURE: 97.8 F | WEIGHT: 173 LBS | BODY MASS INDEX: 29.53 KG/M2 | HEIGHT: 64 IN | RESPIRATION RATE: 20 BRPM | SYSTOLIC BLOOD PRESSURE: 147 MMHG | HEART RATE: 98 BPM | DIASTOLIC BLOOD PRESSURE: 62 MMHG

## 2017-02-21 DIAGNOSIS — C50.111 MALIGNANT NEOPLASM OF CENTRAL PORTION OF RIGHT FEMALE BREAST (HCC): Primary | ICD-10-CM

## 2017-02-21 DIAGNOSIS — R91.8 LUNG NODULES: ICD-10-CM

## 2017-02-21 LAB
ERYTHROCYTE [DISTWIDTH] IN BLOOD BY AUTOMATED COUNT: 22.2 % (ref 11.3–14.5)
HCT VFR BLD AUTO: 32.4 % (ref 34.5–44)
HGB BLD-MCNC: 9.9 G/DL (ref 11.5–15.5)
LYMPHOCYTES # BLD AUTO: 1.7 10*3/MM3 (ref 0.6–4.8)
LYMPHOCYTES NFR BLD AUTO: 27.6 % (ref 24–44)
MCH RBC QN AUTO: 26.8 PG (ref 27–31)
MCHC RBC AUTO-ENTMCNC: 30.6 G/DL (ref 32–36)
MCV RBC AUTO: 87.5 FL (ref 80–99)
MONOCYTES # BLD AUTO: 0.3 10*3/MM3 (ref 0–1)
MONOCYTES NFR BLD AUTO: 4 % (ref 0–12)
NEUTROPHILS # BLD AUTO: 4.3 10*3/MM3 (ref 1.5–8.3)
NEUTROPHILS NFR BLD AUTO: 68.4 % (ref 41–71)
PLATELET # BLD AUTO: 334 10*3/MM3 (ref 150–450)
PMV BLD AUTO: 6.9 FL (ref 6–12)
RBC # BLD AUTO: 3.7 10*6/MM3 (ref 3.89–5.14)
WBC NRBC COR # BLD: 6.3 10*3/MM3 (ref 3.5–10.8)

## 2017-02-21 PROCEDURE — 25010000002 PALONOSETRON PER 25 MCG: Performed by: INTERNAL MEDICINE

## 2017-02-21 PROCEDURE — 96409 CHEMO IV PUSH SNGL DRUG: CPT

## 2017-02-21 PROCEDURE — 96375 TX/PRO/DX INJ NEW DRUG ADDON: CPT

## 2017-02-21 PROCEDURE — 25010000002 DEXAMETHASONE PER 1 MG: Performed by: INTERNAL MEDICINE

## 2017-02-21 PROCEDURE — 25010000002 HEPARIN FLUSH (PORCINE) 100 UNIT/ML SOLUTION: Performed by: INTERNAL MEDICINE

## 2017-02-21 PROCEDURE — 99215 OFFICE O/P EST HI 40 MIN: CPT | Performed by: INTERNAL MEDICINE

## 2017-02-21 PROCEDURE — 25010000002 DOXORUBICIN PER 10 MG: Performed by: INTERNAL MEDICINE

## 2017-02-21 PROCEDURE — 96411 CHEMO IV PUSH ADDL DRUG: CPT

## 2017-02-21 RX ORDER — DOXORUBICIN HYDROCHLORIDE 2 MG/ML
20 INJECTION, SOLUTION INTRAVENOUS ONCE
Status: CANCELLED | OUTPATIENT
Start: 2017-03-07

## 2017-02-21 RX ORDER — PALONOSETRON 0.05 MG/ML
0.25 INJECTION, SOLUTION INTRAVENOUS ONCE
Status: CANCELLED | OUTPATIENT
Start: 2017-02-21

## 2017-02-21 RX ORDER — SODIUM CHLORIDE 9 MG/ML
250 INJECTION, SOLUTION INTRAVENOUS ONCE
Status: CANCELLED | OUTPATIENT
Start: 2017-03-21

## 2017-02-21 RX ORDER — PALONOSETRON 0.05 MG/ML
0.25 INJECTION, SOLUTION INTRAVENOUS ONCE
Status: CANCELLED | OUTPATIENT
Start: 2017-04-11

## 2017-02-21 RX ORDER — DOXORUBICIN HYDROCHLORIDE 2 MG/ML
20 INJECTION, SOLUTION INTRAVENOUS ONCE
Status: COMPLETED | OUTPATIENT
Start: 2017-02-21 | End: 2017-02-21

## 2017-02-21 RX ORDER — DOXORUBICIN HYDROCHLORIDE 2 MG/ML
20 INJECTION, SOLUTION INTRAVENOUS ONCE
Status: CANCELLED | OUTPATIENT
Start: 2017-04-25

## 2017-02-21 RX ORDER — SODIUM CHLORIDE 9 MG/ML
250 INJECTION, SOLUTION INTRAVENOUS ONCE
Status: CANCELLED | OUTPATIENT
Start: 2017-04-18

## 2017-02-21 RX ORDER — PALONOSETRON 0.05 MG/ML
0.25 INJECTION, SOLUTION INTRAVENOUS ONCE
Status: CANCELLED | OUTPATIENT
Start: 2017-05-02

## 2017-02-21 RX ORDER — PALONOSETRON 0.05 MG/ML
0.25 INJECTION, SOLUTION INTRAVENOUS ONCE
Status: CANCELLED | OUTPATIENT
Start: 2017-03-14

## 2017-02-21 RX ORDER — DOXORUBICIN HYDROCHLORIDE 2 MG/ML
20 INJECTION, SOLUTION INTRAVENOUS ONCE
Status: CANCELLED | OUTPATIENT
Start: 2017-02-21

## 2017-02-21 RX ORDER — SODIUM CHLORIDE 9 MG/ML
250 INJECTION, SOLUTION INTRAVENOUS ONCE
Status: CANCELLED | OUTPATIENT
Start: 2017-03-14

## 2017-02-21 RX ORDER — PALONOSETRON 0.05 MG/ML
0.25 INJECTION, SOLUTION INTRAVENOUS ONCE
Status: CANCELLED | OUTPATIENT
Start: 2017-03-07

## 2017-02-21 RX ORDER — PALONOSETRON 0.05 MG/ML
0.25 INJECTION, SOLUTION INTRAVENOUS ONCE
Status: CANCELLED | OUTPATIENT
Start: 2017-04-25

## 2017-02-21 RX ORDER — SODIUM CHLORIDE 9 MG/ML
250 INJECTION, SOLUTION INTRAVENOUS ONCE
Status: CANCELLED | OUTPATIENT
Start: 2017-03-28

## 2017-02-21 RX ORDER — DOXORUBICIN HYDROCHLORIDE 2 MG/ML
20 INJECTION, SOLUTION INTRAVENOUS ONCE
Status: CANCELLED | OUTPATIENT
Start: 2017-04-11

## 2017-02-21 RX ORDER — DOXORUBICIN HYDROCHLORIDE 2 MG/ML
20 INJECTION, SOLUTION INTRAVENOUS ONCE
Status: CANCELLED | OUTPATIENT
Start: 2017-04-18

## 2017-02-21 RX ORDER — PALONOSETRON 0.05 MG/ML
0.25 INJECTION, SOLUTION INTRAVENOUS ONCE
Status: CANCELLED | OUTPATIENT
Start: 2017-03-28

## 2017-02-21 RX ORDER — SODIUM CHLORIDE 0.9 % (FLUSH) 0.9 %
10 SYRINGE (ML) INJECTION AS NEEDED
Status: CANCELLED | OUTPATIENT
Start: 2017-02-21

## 2017-02-21 RX ORDER — SODIUM CHLORIDE 9 MG/ML
250 INJECTION, SOLUTION INTRAVENOUS ONCE
Status: CANCELLED | OUTPATIENT
Start: 2017-04-25

## 2017-02-21 RX ORDER — DOXORUBICIN HYDROCHLORIDE 2 MG/ML
20 INJECTION, SOLUTION INTRAVENOUS ONCE
Status: CANCELLED | OUTPATIENT
Start: 2017-04-04

## 2017-02-21 RX ORDER — DOXORUBICIN HYDROCHLORIDE 2 MG/ML
20 INJECTION, SOLUTION INTRAVENOUS ONCE
Status: CANCELLED | OUTPATIENT
Start: 2017-03-14

## 2017-02-21 RX ORDER — PALONOSETRON 0.05 MG/ML
0.25 INJECTION, SOLUTION INTRAVENOUS ONCE
Status: CANCELLED | OUTPATIENT
Start: 2017-04-04

## 2017-02-21 RX ORDER — DOXORUBICIN HYDROCHLORIDE 2 MG/ML
20 INJECTION, SOLUTION INTRAVENOUS ONCE
Status: CANCELLED | OUTPATIENT
Start: 2017-05-02

## 2017-02-21 RX ORDER — DOXORUBICIN HYDROCHLORIDE 2 MG/ML
20 INJECTION, SOLUTION INTRAVENOUS ONCE
Status: CANCELLED | OUTPATIENT
Start: 2017-02-28

## 2017-02-21 RX ORDER — PALONOSETRON 0.05 MG/ML
0.25 INJECTION, SOLUTION INTRAVENOUS ONCE
Status: CANCELLED | OUTPATIENT
Start: 2017-03-21

## 2017-02-21 RX ORDER — SODIUM CHLORIDE 9 MG/ML
250 INJECTION, SOLUTION INTRAVENOUS ONCE
Status: COMPLETED | OUTPATIENT
Start: 2017-02-21 | End: 2017-02-21

## 2017-02-21 RX ORDER — SODIUM CHLORIDE 9 MG/ML
250 INJECTION, SOLUTION INTRAVENOUS ONCE
Status: CANCELLED | OUTPATIENT
Start: 2017-02-21

## 2017-02-21 RX ORDER — SODIUM CHLORIDE 9 MG/ML
250 INJECTION, SOLUTION INTRAVENOUS ONCE
Status: CANCELLED | OUTPATIENT
Start: 2017-05-02

## 2017-02-21 RX ORDER — SODIUM CHLORIDE 9 MG/ML
250 INJECTION, SOLUTION INTRAVENOUS ONCE
Status: CANCELLED | OUTPATIENT
Start: 2017-02-28

## 2017-02-21 RX ORDER — SODIUM CHLORIDE 9 MG/ML
250 INJECTION, SOLUTION INTRAVENOUS ONCE
Status: CANCELLED | OUTPATIENT
Start: 2017-03-07

## 2017-02-21 RX ORDER — SODIUM CHLORIDE 9 MG/ML
250 INJECTION, SOLUTION INTRAVENOUS ONCE
Status: CANCELLED | OUTPATIENT
Start: 2017-04-11

## 2017-02-21 RX ORDER — PALONOSETRON 0.05 MG/ML
0.25 INJECTION, SOLUTION INTRAVENOUS ONCE
Status: COMPLETED | OUTPATIENT
Start: 2017-02-21 | End: 2017-02-21

## 2017-02-21 RX ORDER — DOXORUBICIN HYDROCHLORIDE 2 MG/ML
20 INJECTION, SOLUTION INTRAVENOUS ONCE
Status: CANCELLED | OUTPATIENT
Start: 2017-03-21

## 2017-02-21 RX ORDER — DOXORUBICIN HYDROCHLORIDE 2 MG/ML
20 INJECTION, SOLUTION INTRAVENOUS ONCE
Status: CANCELLED | OUTPATIENT
Start: 2017-03-28

## 2017-02-21 RX ORDER — SODIUM CHLORIDE 9 MG/ML
250 INJECTION, SOLUTION INTRAVENOUS ONCE
Status: CANCELLED | OUTPATIENT
Start: 2017-04-04

## 2017-02-21 RX ORDER — PALONOSETRON 0.05 MG/ML
0.25 INJECTION, SOLUTION INTRAVENOUS ONCE
Status: CANCELLED | OUTPATIENT
Start: 2017-02-28

## 2017-02-21 RX ORDER — PALONOSETRON 0.05 MG/ML
0.25 INJECTION, SOLUTION INTRAVENOUS ONCE
Status: CANCELLED | OUTPATIENT
Start: 2017-04-18

## 2017-02-21 RX ADMIN — DOXORUBICIN HYDROCHLORIDE 36 MG: 2 INJECTION, SOLUTION INTRAVENOUS at 13:58

## 2017-02-21 RX ADMIN — DEXAMETHASONE SODIUM PHOSPHATE 12 MG: 4 INJECTION, SOLUTION INTRAMUSCULAR; INTRAVENOUS at 13:35

## 2017-02-21 RX ADMIN — PALONOSETRON HYDROCHLORIDE 0.25 MG: 0.25 INJECTION INTRAVENOUS at 13:35

## 2017-02-21 RX ADMIN — HEPARIN 500 UNITS: 100 SYRINGE at 14:07

## 2017-02-21 RX ADMIN — SODIUM CHLORIDE 250 ML: 9 INJECTION, SOLUTION INTRAVENOUS at 13:35

## 2017-02-21 NOTE — PROGRESS NOTES
DATE OF VISIT: 2/21/2017    REASON FOR VISIT: Followup for   1. Breast cancer:   a) Initially presenting as stage IIB disease, T2N1M0, estrogen receptor  strongly positive, progesterone receptor intermediate, HER-2/jean marie negative.   b) Status post right mastectomy followed by adjuvant chemotherapy using  Taxotere and Cytoxan 4 cycles.   c) Took Arimidex 1 mg p.o. daily from 01/10/2013 until 04/28/2015.   2. Locally relapsed disease with right chest wall as well as axillary lymph  nodes biopsy proven metastatic disease done 04/17/2015.  3. Enrolled on clinical trial ECoG  randomizing patients for 2nd line  hormone treatment with Aromasin with placebo versus Aromasin with Entinostat.   4. Currently on Aromasin single agent.   5. Completed adjuvant radiation treatment by Dr. Benavides to the right chest  wall as well as right axillary lymph nodes 12/09/2015.   6. Currently metastatic disease with new liver metastases documented on CAT  scan 04/22/2016.   7. Started Faslodex Ibrance 05/02/2016.   8. Switched to Carbo/gemzar weekly secondary to progressive disease from 07/22/2016 till 10/11/2016.  9. Started gemzar single agent weekly 2 weeks on and 1 week off 10/25/2016.  10.  Started weekly Adriamycin single agent on February 14, 2017     HISTORY OF PRESENT ILLNESS: The patient is a very pleasant 75-year-old female  with past medical history significant for invasive ductal carcinoma of the  right breast diagnosed 06/21/2012. The patient is status post right mastectomy  on 07/13/2012 with lymph node dissection, tumor greatest dimension was 2.5 cm,  2 out of 8 positive axial lymph nodes, clear surgical margins. The patient was  started on adjuvant chemotherapy using Taxotere and Cytoxan on 10/08/2012. The  patient completed her treatment on 12/10/2012. The patient was started on  adjuvant hormone treatment using Arimidex 1 mg p.o. daily 01/10/2013. The  patient presented with right chest wall mass. Biopsy done on  04/17/2015  revealed relapsed high-grade ductal carcinoma. She had CAT scan of the chest,  abdomen, and pelvis that revealed disease in the right chest wall as well as  right axilla. The patient was enrolled with ECoG  Aromasin with or without  Entinostat on 05/08/2015. The patient had progressive disease documented on  scans done 04/22/2016 with liver metastases. The patient was started on  Faslodex Ibrance on 05/02/2016. Repeat CT scan done 7/22/2016 showed continued progression of disease, and the patient's treatment was changed to carbo/gemzar 2 weeks on, 1 week off. Completed 5 cycles on 10/11/2016. shw started Gemzar single agent 10/25/2016.  Repeated CAT scan done February 3, 2017 showed progressive liver metastases.  Patient was switched to weekly Adriamycin started on February 14, 2017.  The patient is here today for cycle #1, day 8.      SUBJECTIVE: The patient has been doing fairly well. She notes that her appetite has declined some. She has not lost any significant amount of weight, but notes she is eating less. She has no nausea or vomiting. She has not had much pain or discomfort, with only rare use of her Norco. She denies cough or shortness of breath.     PAST MEDICAL HISTORY/SOCIAL HISTORY/FAMILY HISTORY: Unchanged from my prior documentation done on 10/2/2012.     Review of Systems   Constitutional: Positive for appetite change and fatigue. Negative for activity change, chills, fever and unexpected weight change.   HENT: Negative for hearing loss, mouth sores, nosebleeds, sore throat and trouble swallowing.    Eyes: Negative for visual disturbance.   Respiratory: Negative for chest tightness, shortness of breath and wheezing.    Cardiovascular: Negative for chest pain, palpitations and leg swelling.   Gastrointestinal: Negative for abdominal distention, abdominal pain, blood in stool, constipation, diarrhea, nausea, rectal pain and vomiting.   Endocrine: Negative for cold intolerance and heat  "intolerance.   Genitourinary: Negative for difficulty urinating, dysuria, frequency and urgency.   Musculoskeletal: Positive for arthralgias. Negative for back pain, gait problem and myalgias.   Skin: Negative for rash.   Neurological: Negative for dizziness, tremors, syncope, weakness, light-headedness, numbness and headaches.   Hematological: Negative for adenopathy. Does not bruise/bleed easily.   Psychiatric/Behavioral: Negative for confusion, sleep disturbance and suicidal ideas. The patient is not nervous/anxious.          Current Outpatient Prescriptions:   •  amLODIPine (NORVASC) 10 MG tablet, Take 10 mg by mouth daily., Disp: , Rfl:   •  hydrALAZINE (APRESOLINE) 25 MG tablet, Take 25 mg by mouth 3 (three) times a day., Disp: , Rfl:   •  HYDROcodone-acetaminophen (NORCO) 5-325 MG per tablet, Take 1 tablet by mouth Every 8 (Eight) Hours As Needed for moderate pain (4-6)., Disp: 90 tablet, Rfl: 0  •  lidocaine-prilocaine (EMLA) 2.5-2.5 % cream, Apply  topically As Needed for mild pain (1-3) (prior to port access)., Disp: 30 g, Rfl: 2  •  Multiple Vitamin (TAB-A-TESS) tablet, Take 1 tablet by mouth daily., Disp: , Rfl: 0  •  quinapril (ACCUPRIL) 10 MG tablet, Take 10 mg by mouth every night., Disp: , Rfl:   •  VENTOLIN  (90 BASE) MCG/ACT inhaler, Inhale 2 puffs every 6 (six) hours as needed., Disp: , Rfl: 0    PHYSICAL EXAMINATION:   Visit Vitals   • /62   • Pulse 98   • Temp 97.8 °F (36.6 °C) (Temporal Artery )   • Resp 20   • Ht 63.5\" (161.3 cm)   • Wt 173 lb (78.5 kg)   • BMI 30.16 kg/m2      ECOG Performance Status: 1  General Appearance:  alert, cooperative, no apparent distress and appears stated age   Neurologic/Psychiatric: A&O x 3, gait steady, appropriate affect, strength 5/5 in all muscle groups   HEENT:  Normocephalic, without obvious abnormality, mucous membranes moist   Neck: Supple, symmetrical, trachea midline, no adenopathy;  No thyromegaly, masses, or tenderness   Lungs:   Clear " to auscultation bilaterally; respirations regular, even, and unlabored bilaterally   Heart:  Regular rate and rhythm, no murmurs appreciated   Abdomen:   Soft, non-tender, non-distended and no organomegaly   Lymph nodes: No cervical, supraclavicular, inguinal or axillary adenopathy noted   Extremities: Normal, atraumatic; no clubbing, cyanosis, or edema    Skin: No rashes, ulcers, or suspicious lesions noted     Infusion on 02/14/2017   Component Date Value Ref Range Status   • Glucose 02/14/2017 92  70 - 100 mg/dL Final   • BUN 02/14/2017 13  9 - 23 mg/dL Final   • Creatinine 02/14/2017 0.80  0.60 - 1.30 mg/dL Final   • Sodium 02/14/2017 140  132 - 146 mmol/L Final   • Potassium 02/14/2017 4.3  3.5 - 5.5 mmol/L Final   • Chloride 02/14/2017 103  99 - 109 mmol/L Final   • CO2 02/14/2017 32.0* 20.0 - 31.0 mmol/L Final   • Calcium 02/14/2017 9.2  8.7 - 10.4 mg/dL Final   • Total Protein 02/14/2017 6.9  5.7 - 8.2 g/dL Final   • Albumin 02/14/2017 3.50  3.20 - 4.80 g/dL Final   • ALT (SGPT) 02/14/2017 27  7 - 40 U/L Final   • AST (SGOT) 02/14/2017 56* 0 - 33 U/L Final   • Alkaline Phosphatase 02/14/2017 133* 25 - 100 U/L Final   • Total Bilirubin 02/14/2017 0.3  0.3 - 1.2 mg/dL Final   • eGFR   Amer 02/14/2017 84  >60 mL/min/1.73 Final   • Globulin 02/14/2017 3.4  gm/dL Final   • A/G Ratio 02/14/2017 1.0* 1.5 - 2.5 g/dL Final   • BUN/Creatinine Ratio 02/14/2017 16.3  7.0 - 25.0 Final   • Anion Gap 02/14/2017 5.0  3.0 - 11.0 mmol/L Final   • WBC 02/14/2017 8.80  3.50 - 10.80 10*3/mm3 Final   • RBC 02/14/2017 3.55* 3.89 - 5.14 10*6/mm3 Final   • Hemoglobin 02/14/2017 9.8* 11.5 - 15.5 g/dL Final   • Hematocrit 02/14/2017 31.0* 34.5 - 44.0 % Final   • RDW 02/14/2017 20.8* 11.3 - 14.5 % Final   • MCV 02/14/2017 87.3  80.0 - 99.0 fL Final   • MCH 02/14/2017 27.6  27.0 - 31.0 pg Final   • MCHC 02/14/2017 31.6* 32.0 - 36.0 g/dL Final   • MPV 02/14/2017 6.9  6.0 - 12.0 fL Final   • Platelets 02/14/2017 480* 150 - 450  10*3/mm3 Final   • Neutrophil % 02/14/2017 67.5  41.0 - 71.0 % Final   • Lymphocyte % 02/14/2017 28.8  24.0 - 44.0 % Final   • Monocyte % 02/14/2017 3.7  0.0 - 12.0 % Final   • Neutrophils, Absolute 02/14/2017 5.90  1.50 - 8.30 10*3/mm3 Final   • Lymphocytes, Absolute 02/14/2017 2.50  0.60 - 4.80 10*3/mm3 Final   • Monocytes, Absolute 02/14/2017 0.30  0.00 - 1.00 10*3/mm3 Final   Hospital Outpatient Visit on 02/09/2017   Component Date Value Ref Range Status   • BSA 02/09/2017 1.7  m^2 Preliminary   • BH CV ECHO MILAGRO - RVDD 02/09/2017 2.0  cm Preliminary   • IVSd 02/09/2017 0.65  cm Preliminary   • LVIDd 02/09/2017 5.0  cm Preliminary   • LVIDs 02/09/2017 2.7  cm Preliminary   • LVPWd 02/09/2017 0.92  cm Preliminary   • IVS/LVPW 02/09/2017 0.71   Preliminary   • FS 02/09/2017 46.0  % Preliminary   • EDV(Teich) 02/09/2017 116.1  ml Preliminary   • ESV(Teich) 02/09/2017 26.5  ml Preliminary   • EF(Teich) 02/09/2017 77.1  % Preliminary   • EDV(cubed) 02/09/2017 122.0  ml Preliminary   • ESV(cubed) 02/09/2017 19.2  ml Preliminary   • EF(cubed) 02/09/2017 84.2  % Preliminary   • LV mass(C)d 02/09/2017 130.6  grams Preliminary   • LV mass(C)dI 02/09/2017 75.9  grams/m^2 Preliminary   • SV(Teich) 02/09/2017 89.5  ml Preliminary   • SI(Teich) 02/09/2017 52.0  ml/m^2 Preliminary   • SV(cubed) 02/09/2017 102.8  ml Preliminary   • SI(cubed) 02/09/2017 59.7  ml/m^2 Preliminary   • Ao root diam 02/09/2017 2.7  cm Preliminary   • Ao root area 02/09/2017 5.7  cm^2 Preliminary   • LA dimension 02/09/2017 2.9  cm Preliminary   • LA/Ao 02/09/2017 1.1   Preliminary   • LVOT diam 02/09/2017 2.0  cm Preliminary   • LVOT area 02/09/2017 3.1  cm^2 Preliminary   • LVOT area(traced) 02/09/2017 3.1  cm^2 Preliminary   • Ao root area (BSA corrected) 02/09/2017 1.6   Preliminary   • MV E max butch 02/09/2017 76.8  cm/sec Preliminary   • MV A max butch 02/09/2017 120.0  cm/sec Preliminary   • MV E/A 02/09/2017 0.64   Preliminary   • Ao pk butch  02/09/2017 219.8  cm/sec Preliminary   • Ao max PG 02/09/2017 19.4  mmHg Preliminary   • Ao max PG (full) 02/09/2017 23.0  mmHg Preliminary   • Ao V2 mean 02/09/2017 135.5  cm/sec Preliminary   • Ao mean PG 02/09/2017 11.0  mmHg Preliminary   • Ao mean PG (full) 02/09/2017 6.5  mmHg Preliminary   • Ao V2 VTI 02/09/2017 42.6  cm Preliminary   • EFE(I,A) 02/09/2017 1.6  cm^2 Preliminary   • EFE(I,D) 02/09/2017 1.6  cm^2 Preliminary   • EFE(V,A) 02/09/2017 1.6  cm^2 Preliminary   • EFE(V,D) 02/09/2017 1.6  cm^2 Preliminary   • LV V1 max PG 02/09/2017 4.9  mmHg Preliminary   • LV V1 mean PG 02/09/2017 2.5  mmHg Preliminary   • LV V1 max 02/09/2017 110.5  cm/sec Preliminary   • LV V1 mean 02/09/2017 65.5  cm/sec Preliminary   • LV V1 VTI 02/09/2017 21.4  cm Preliminary   • SV(Ao) 02/09/2017 243.9  ml Preliminary   • SI(Ao) 02/09/2017 141.7  ml/m^2 Preliminary   • SV(LVOT) 02/09/2017 67.2  ml Preliminary   • SI(LVOT) 02/09/2017 39.1  ml/m^2 Preliminary   • TV V2 max 02/09/2017 73.1  cm/sec Preliminary   • TV max PG 02/09/2017 2.1  mmHg Preliminary   • PA V2 max 02/09/2017 163.5  cm/sec Preliminary   • PA max PG 02/09/2017 10.7  mmHg Preliminary   • TR max butch 02/09/2017 322.2  cm/sec Preliminary   • BH CV ECHO [facility]RO - BZI_BMI 02/09/2017 26.9  kilograms/m^2 Preliminary   • BH CV ECHO [facility] - BSA([facility]) 02/09/2017 1.8  m^2 Preliminary   • BH CV ECHO [facility]RO - BZI_METRIC_WEIGHT 02/09/2017 68.9  kg Preliminary   • BH CV ECHO MIL[place]RO - BZI_METRIC_HEIGHT 02/09/2017 160.0  cm Preliminary   • TDI S' 02/09/2017 24.80  cm/sec Final   • RV Base 02/09/2017 2.70  cm Final   • RV Length 02/09/2017 5.90  cm Final   • RV Mid 02/09/2017 2.40  cm Final   • Echo EF Estimated 02/09/2017 60  % Final   • RAP systole 02/09/2017 8  mmHg Final   • RVSP(TR) 02/09/2017 51  mmHg Final   • TAPSE (>1.6) 02/09/2017 2.20  cm2 Final        Ct Chest With Contrast    Result Date: 2/3/2017  Narrative: EXAMINATION: CT CHEST W CONTRAST-, CT ABDOMEN AND PELVIS W  CONTRAST-  INDICATION: C50.111-Malignant neoplasm of central portion of right female breast; followup right breast cancer.  TECHNIQUE: Multiaxial CT imaging was obtained of the chest, abdomen and pelvis following the administration of intravenous contrast.  The radiation dose reduction device was turned on for each scan per the ALARA (As Low as Reasonably Achievable) protocol.  COMPARISON: 11/15/2016.  FINDINGS: CHEST: Thyroid is heterogeneous in appearance. There is no change seen in the size of the left axillary adenopathy. The largest lymph node measures today 3.7 x 1.7 cm.  There is a smaller lymph node measuring 1.8 x 1.2 cm. The lymph nodes overall have not significantly changed in size when compared to the prior study.  A surgical clip is seen within the right axillary region. There is a small postoperative seroma or hematoma which is stable on the right. Patient again is status post right mastectomy. Vascular calcification is seen within the thoracic aorta. There is a small precarinal lymph node identified today measuring 1.8 cm and previously measuring 1.9 cm. The overall size and appearance is stable. There is no bulky hilar adenopathy. The cardiac chambers within normal limits. No pericardial effusion.  The lung parenchyma reveals some scarring identified anteriorly. There is evidence of a small pulmonary nodule seen anteriorly within the left lung which is slightly increasing in size when compared to the prior study today measuring approximately 9 mm and previously measuring 7 mm. The remainder of the lung fields are clear. Degenerative changes seen within the spine.  ABDOMEN: The liver is heterogeneous in appearance with multiple low-density lesions suggesting hepatic metastatic disease. These low-density lesions appear to have slightly increased in size when compared to the prior study for example a larger lesion seen in the periphery of the inferior aspect of the right lobe liver today measures 4.7 cm  in its largest dimension and previously measured 3.2 cm. The kidneys and adrenal glands are both unremarkable. The spleen is unremarkable. The pancreas is within normal limits. No abdominal or retroperitoneal lymphadenopathy. Vascular calcification is seen within the abdominal aorta and iliac vessels. No free fluid or free air.  PELVIS: Pelvic organs are unremarkable. The pelvic portion of the gastrointestinal tract is within normal limits. No pelvic adenopathy. No abnormal mass or fluid collection is identified. Bony structures reveal minimal degenerative changes identified within the spine and pelvis. Degenerative change is identified as well within the thoracic spine.      Impression: Slight interval progression of disease with interval increase seen in size of the liver lesions. There is also slight interval increase seen in size of the pulmonary nodule within the anterior medial aspect of the left upper lobe. The lymphadenopathy within the left axillary region is stable.  D:  02/03/2017 E:  02/03/2017  This report was finalized on 2/3/2017 11:24 AM by Dr. Gilda Lambert MD.      Ct Abdomen Pelvis With Contrast    Result Date: 2/3/2017  Narrative: EXAMINATION: CT CHEST W CONTRAST-, CT ABDOMEN AND PELVIS W CONTRAST-  INDICATION: C50.111-Malignant neoplasm of central portion of right female breast; followup right breast cancer.  TECHNIQUE: Multiaxial CT imaging was obtained of the chest, abdomen and pelvis following the administration of intravenous contrast.  The radiation dose reduction device was turned on for each scan per the ALARA (As Low as Reasonably Achievable) protocol.  COMPARISON: 11/15/2016.  FINDINGS: CHEST: Thyroid is heterogeneous in appearance. There is no change seen in the size of the left axillary adenopathy. The largest lymph node measures today 3.7 x 1.7 cm.  There is a smaller lymph node measuring 1.8 x 1.2 cm. The lymph nodes overall have not significantly changed in size when compared  to the prior study.  A surgical clip is seen within the right axillary region. There is a small postoperative seroma or hematoma which is stable on the right. Patient again is status post right mastectomy. Vascular calcification is seen within the thoracic aorta. There is a small precarinal lymph node identified today measuring 1.8 cm and previously measuring 1.9 cm. The overall size and appearance is stable. There is no bulky hilar adenopathy. The cardiac chambers within normal limits. No pericardial effusion.  The lung parenchyma reveals some scarring identified anteriorly. There is evidence of a small pulmonary nodule seen anteriorly within the left lung which is slightly increasing in size when compared to the prior study today measuring approximately 9 mm and previously measuring 7 mm. The remainder of the lung fields are clear. Degenerative changes seen within the spine.  ABDOMEN: The liver is heterogeneous in appearance with multiple low-density lesions suggesting hepatic metastatic disease. These low-density lesions appear to have slightly increased in size when compared to the prior study for example a larger lesion seen in the periphery of the inferior aspect of the right lobe liver today measures 4.7 cm in its largest dimension and previously measured 3.2 cm. The kidneys and adrenal glands are both unremarkable. The spleen is unremarkable. The pancreas is within normal limits. No abdominal or retroperitoneal lymphadenopathy. Vascular calcification is seen within the abdominal aorta and iliac vessels. No free fluid or free air.  PELVIS: Pelvic organs are unremarkable. The pelvic portion of the gastrointestinal tract is within normal limits. No pelvic adenopathy. No abnormal mass or fluid collection is identified. Bony structures reveal minimal degenerative changes identified within the spine and pelvis. Degenerative change is identified as well within the thoracic spine.      Impression: Slight interval  progression of disease with interval increase seen in size of the liver lesions. There is also slight interval increase seen in size of the pulmonary nodule within the anterior medial aspect of the left upper lobe. The lymphadenopathy within the left axillary region is stable.  D:  02/03/2017 E:  02/03/2017  This report was finalized on 2/3/2017 11:24 AM by Dr. Gilda Lambert MD.        ASSESSMENT: The patient is a very pleasant 75-year-old female with right breast  cancer.     PROBLEM LIST:  1. T2N1M0 invasive ductal carcinoma of the right breast, tumor greatest  dimension 2.5 cm, estrogen receptor strongly positive, progesterone receptor  intermediate, HER-2/jean marie negative by IHC score of 0, 2 out of 8 positive  axillary lymph nodes.   2. Status post right mastectomy with axillary lymph node dissection done by  Dr. Heart on 07/30/2012.   3. Status post 4 cycles of adjuvant chemotherapy using Taxotere and Cytoxan  from 10/08/2012 until 12/10/2012.   4. Took Arimidex 1 mg p.o. daily from 01/10/2013 until 04/28/2015.   5. Relapsed disease documented on CAT scan of the chest, abdomen, and pelvis  done 04/27/2015. Revealed right chest wall mass as well as right axillary  lymphadenopathy. Biopsy done 04/17/2015 revealed invasive ductal carcinoma.   6. Enrolled on ECoG  protocol, Aromasin with placebo versus Aromasin and  Entinostat, 05/08/2015.   7. Stopped Aromasin 04/25/2016 secondary to new liver metastases documented on  CAT scan 04/22/2016.   8. Completed adjuvant radiation treatment to the right chest wall 12/09/2015.  9. Status post right axillary dissection done by Dr. Heart 03/14/2016.   10. Progressive disease documented on CAT scan done 04/22/2016 with new liver  metastases.   11. Started Faslodex Ibrance 05/02/2016.  12. Neutropenia secondary Ibrance. The patient is on 100 mg, 3 weeks on and 1  week off.   13. Rheumatoid arthritis.   14. Worsening metastatic disease documented on CT scans done on  07/25/2016.  15. Started on carboplatin AUC 2 with gemzar given two weeks on, one week off. She is status post three cycle of treatment.   16. Mixed response to treatment documented on CAT scans done 11/15/2016.  17. On gemzar single agent, status post 6 cycles.  18.  Progressive disease documented on CAT scan done February 4, 2017.  19.  Started weekly Adriamycin February 14, 2017.     PLAN:  1. I would proceed with treatment as scheduled today.  2. I did go over the cardiac echo results with the patient, I explained to her that her ejection fraction is normal.  We'll repeat this down the road if needed.  3.  The patient will follow-up with me in 2 weeks.  4. We sent in a prescription for EMLA cream to be used prior to port access.  5. The patient will continue to hold her methotrexate as she is on active cancer treatment.  6. We will continue to monitor the patient's blood counts, kidney function, and liver function throughout treatment.   7. We will continue Norco 5/325 mg 1 tablet taken every 6 hours as needed for cancer-related pain. She did not need a refill on this prescription today.   8.  I will do a follow-up CT scan before cycle #3.  9.  We reviewed the side effects of this regimen including nausea, fatigue, myelosuppression, infusion reaction, cardiotoxicity, myelodysplasia, alopecia, and constipation or diarrhea.      Scribed for France Chun MD by LIDIA Chirinos. 2/21/2017  11:58 AM  France Chun MD  2/21/2017   I have reviewed the notes, assessments, and/or procedures performed by LIDIA Chirinos, I concur with her/his documentation of Nava Cline.

## 2017-02-28 ENCOUNTER — INFUSION (OUTPATIENT)
Dept: ONCOLOGY | Facility: HOSPITAL | Age: 77
End: 2017-02-28

## 2017-02-28 VITALS
WEIGHT: 173 LBS | SYSTOLIC BLOOD PRESSURE: 135 MMHG | BODY MASS INDEX: 29.53 KG/M2 | TEMPERATURE: 97.9 F | HEIGHT: 64 IN | RESPIRATION RATE: 20 BRPM | DIASTOLIC BLOOD PRESSURE: 64 MMHG | HEART RATE: 103 BPM

## 2017-02-28 DIAGNOSIS — R91.8 LUNG NODULES: ICD-10-CM

## 2017-02-28 DIAGNOSIS — C50.111 MALIGNANT NEOPLASM OF CENTRAL PORTION OF RIGHT FEMALE BREAST (HCC): Primary | ICD-10-CM

## 2017-02-28 LAB
ERYTHROCYTE [DISTWIDTH] IN BLOOD BY AUTOMATED COUNT: 23.7 % (ref 11.3–14.5)
HCT VFR BLD AUTO: 31.8 % (ref 34.5–44)
HGB BLD-MCNC: 9.9 G/DL (ref 11.5–15.5)
LYMPHOCYTES # BLD AUTO: 1.5 10*3/MM3 (ref 0.6–4.8)
LYMPHOCYTES NFR BLD AUTO: 27.7 % (ref 24–44)
MCH RBC QN AUTO: 27.3 PG (ref 27–31)
MCHC RBC AUTO-ENTMCNC: 31.1 G/DL (ref 32–36)
MCV RBC AUTO: 87.7 FL (ref 80–99)
MONOCYTES # BLD AUTO: 0.4 10*3/MM3 (ref 0–1)
MONOCYTES NFR BLD AUTO: 6.4 % (ref 0–12)
NEUTROPHILS # BLD AUTO: 3.6 10*3/MM3 (ref 1.5–8.3)
NEUTROPHILS NFR BLD AUTO: 65.9 % (ref 41–71)
PLATELET # BLD AUTO: 271 10*3/MM3 (ref 150–450)
PMV BLD AUTO: 6.9 FL (ref 6–12)
RBC # BLD AUTO: 3.62 10*6/MM3 (ref 3.89–5.14)
WBC NRBC COR # BLD: 5.5 10*3/MM3 (ref 3.5–10.8)

## 2017-02-28 PROCEDURE — 25010000002 DEXAMETHASONE PER 1 MG: Performed by: INTERNAL MEDICINE

## 2017-02-28 PROCEDURE — 96409 CHEMO IV PUSH SNGL DRUG: CPT

## 2017-02-28 PROCEDURE — 25010000002 DOXORUBICIN PER 10 MG: Performed by: INTERNAL MEDICINE

## 2017-02-28 PROCEDURE — 25010000002 PALONOSETRON PER 25 MCG: Performed by: INTERNAL MEDICINE

## 2017-02-28 PROCEDURE — 96375 TX/PRO/DX INJ NEW DRUG ADDON: CPT

## 2017-02-28 PROCEDURE — 25010000002 HEPARIN FLUSH (PORCINE) 100 UNIT/ML SOLUTION: Performed by: INTERNAL MEDICINE

## 2017-02-28 RX ORDER — PALONOSETRON 0.05 MG/ML
0.25 INJECTION, SOLUTION INTRAVENOUS ONCE
Status: COMPLETED | OUTPATIENT
Start: 2017-02-28 | End: 2017-02-28

## 2017-02-28 RX ORDER — SODIUM CHLORIDE 9 MG/ML
250 INJECTION, SOLUTION INTRAVENOUS ONCE
Status: DISCONTINUED | OUTPATIENT
Start: 2017-02-28 | End: 2017-02-28 | Stop reason: HOSPADM

## 2017-02-28 RX ORDER — DOXORUBICIN HYDROCHLORIDE 2 MG/ML
20 INJECTION, SOLUTION INTRAVENOUS ONCE
Status: COMPLETED | OUTPATIENT
Start: 2017-02-28 | End: 2017-02-28

## 2017-02-28 RX ORDER — SODIUM CHLORIDE 0.9 % (FLUSH) 0.9 %
10 SYRINGE (ML) INJECTION AS NEEDED
Status: CANCELLED | OUTPATIENT
Start: 2017-02-28

## 2017-02-28 RX ADMIN — PALONOSETRON HYDROCHLORIDE 0.25 MG: 0.25 INJECTION INTRAVENOUS at 14:18

## 2017-02-28 RX ADMIN — HEPARIN 500 UNITS: 100 SYRINGE at 14:48

## 2017-02-28 RX ADMIN — DOXORUBICIN HYDROCHLORIDE 36 MG: 2 INJECTION, SOLUTION INTRAVENOUS at 14:41

## 2017-02-28 RX ADMIN — DEXAMETHASONE SODIUM PHOSPHATE 12 MG: 4 INJECTION, SOLUTION INTRAMUSCULAR; INTRAVENOUS at 14:20

## 2017-03-07 ENCOUNTER — INFUSION (OUTPATIENT)
Dept: ONCOLOGY | Facility: HOSPITAL | Age: 77
End: 2017-03-07

## 2017-03-07 ENCOUNTER — OFFICE VISIT (OUTPATIENT)
Dept: ONCOLOGY | Facility: CLINIC | Age: 77
End: 2017-03-07

## 2017-03-07 VITALS
BODY MASS INDEX: 29.71 KG/M2 | DIASTOLIC BLOOD PRESSURE: 78 MMHG | SYSTOLIC BLOOD PRESSURE: 159 MMHG | WEIGHT: 174 LBS | TEMPERATURE: 97.5 F | RESPIRATION RATE: 20 BRPM | HEART RATE: 96 BPM | HEIGHT: 64 IN

## 2017-03-07 DIAGNOSIS — C50.111 MALIGNANT NEOPLASM OF CENTRAL PORTION OF RIGHT FEMALE BREAST (HCC): ICD-10-CM

## 2017-03-07 DIAGNOSIS — C50.111 MALIGNANT NEOPLASM OF CENTRAL PORTION OF RIGHT FEMALE BREAST (HCC): Primary | ICD-10-CM

## 2017-03-07 DIAGNOSIS — R91.8 LUNG NODULES: Primary | ICD-10-CM

## 2017-03-07 LAB
ERYTHROCYTE [DISTWIDTH] IN BLOOD BY AUTOMATED COUNT: 24 % (ref 11.3–14.5)
HCT VFR BLD AUTO: 33 % (ref 34.5–44)
HGB BLD-MCNC: 10.2 G/DL (ref 11.5–15.5)
LYMPHOCYTES # BLD AUTO: 1.2 10*3/MM3 (ref 0.6–4.8)
LYMPHOCYTES NFR BLD AUTO: 37 % (ref 24–44)
MCH RBC QN AUTO: 27.6 PG (ref 27–31)
MCHC RBC AUTO-ENTMCNC: 31 G/DL (ref 32–36)
MCV RBC AUTO: 88.9 FL (ref 80–99)
MONOCYTES # BLD AUTO: 0.2 10*3/MM3 (ref 0–1)
MONOCYTES NFR BLD AUTO: 6 % (ref 0–12)
NEUTROPHILS # BLD AUTO: 1.8 10*3/MM3 (ref 1.5–8.3)
NEUTROPHILS NFR BLD AUTO: 57 % (ref 41–71)
PLATELET # BLD AUTO: 207 10*3/MM3 (ref 150–450)
PMV BLD AUTO: 7.1 FL (ref 6–12)
RBC # BLD AUTO: 3.71 10*6/MM3 (ref 3.89–5.14)
WBC NRBC COR # BLD: 3.2 10*3/MM3 (ref 3.5–10.8)

## 2017-03-07 PROCEDURE — 25010000002 DEXAMETHASONE PER 1 MG: Performed by: INTERNAL MEDICINE

## 2017-03-07 PROCEDURE — 96375 TX/PRO/DX INJ NEW DRUG ADDON: CPT

## 2017-03-07 PROCEDURE — 96409 CHEMO IV PUSH SNGL DRUG: CPT

## 2017-03-07 PROCEDURE — 25010000002 PALONOSETRON PER 25 MCG: Performed by: INTERNAL MEDICINE

## 2017-03-07 PROCEDURE — 25010000002 HEPARIN FLUSH (PORCINE) 100 UNIT/ML SOLUTION: Performed by: INTERNAL MEDICINE

## 2017-03-07 PROCEDURE — 96376 TX/PRO/DX INJ SAME DRUG ADON: CPT

## 2017-03-07 PROCEDURE — 25010000002 DOXORUBICIN PER 10 MG: Performed by: INTERNAL MEDICINE

## 2017-03-07 PROCEDURE — 99214 OFFICE O/P EST MOD 30 MIN: CPT | Performed by: NURSE PRACTITIONER

## 2017-03-07 RX ORDER — PALONOSETRON 0.05 MG/ML
0.25 INJECTION, SOLUTION INTRAVENOUS ONCE
Status: COMPLETED | OUTPATIENT
Start: 2017-03-07 | End: 2017-03-07

## 2017-03-07 RX ORDER — SODIUM CHLORIDE 0.9 % (FLUSH) 0.9 %
10 SYRINGE (ML) INJECTION AS NEEDED
Status: DISCONTINUED | OUTPATIENT
Start: 2017-03-07 | End: 2017-03-07 | Stop reason: HOSPADM

## 2017-03-07 RX ORDER — SODIUM CHLORIDE 0.9 % (FLUSH) 0.9 %
10 SYRINGE (ML) INJECTION AS NEEDED
Status: CANCELLED | OUTPATIENT
Start: 2017-03-07

## 2017-03-07 RX ORDER — DOXORUBICIN HYDROCHLORIDE 2 MG/ML
20 INJECTION, SOLUTION INTRAVENOUS ONCE
Status: COMPLETED | OUTPATIENT
Start: 2017-03-07 | End: 2017-03-07

## 2017-03-07 RX ORDER — SODIUM CHLORIDE 9 MG/ML
250 INJECTION, SOLUTION INTRAVENOUS ONCE
Status: COMPLETED | OUTPATIENT
Start: 2017-03-07 | End: 2017-03-07

## 2017-03-07 RX ADMIN — HEPARIN 500 UNITS: 100 SYRINGE at 14:24

## 2017-03-07 RX ADMIN — DOXORUBICIN HYDROCHLORIDE 36 MG: 2 INJECTION, SOLUTION INTRAVENOUS at 14:12

## 2017-03-07 RX ADMIN — SODIUM CHLORIDE 250 ML: 9 INJECTION, SOLUTION INTRAVENOUS at 13:53

## 2017-03-07 RX ADMIN — DEXAMETHASONE SODIUM PHOSPHATE 12 MG: 4 INJECTION, SOLUTION INTRAMUSCULAR; INTRAVENOUS at 13:53

## 2017-03-07 RX ADMIN — PALONOSETRON HYDROCHLORIDE 0.25 MG: 0.25 INJECTION INTRAVENOUS at 13:53

## 2017-03-07 RX ADMIN — Medication 10 ML: at 14:24

## 2017-03-07 NOTE — PROGRESS NOTES
DATE OF VISIT: 3/7/2017    REASON FOR VISIT: Followup for   1. Breast cancer:   a) Initially presenting as stage IIB disease, T2N1M0, estrogen receptor  strongly positive, progesterone receptor intermediate, HER-2/jean marie negative.   b) Status post right mastectomy followed by adjuvant chemotherapy using  Taxotere and Cytoxan 4 cycles.   c) Took Arimidex 1 mg p.o. daily from 01/10/2013 until 04/28/2015.   2. Locally relapsed disease with right chest wall as well as axillary lymph  nodes biopsy proven metastatic disease done 04/17/2015.  3. Enrolled on clinical trial [employer]  randomizing patients for 2nd line  hormone treatment with Aromasin with placebo versus Aromasin with Entinostat.   4. Currently on Aromasin single agent.   5. Completed adjuvant radiation treatment by Dr. Benavides to the right chest  wall as well as right axillary lymph nodes 12/09/2015.   6. Currently metastatic disease with new liver metastases documented on CAT  scan 04/22/2016.   7. Started Faslodex Ibrance 05/02/2016.   8. Switched to Carbo/gemzar weekly secondary to progressive disease from 07/22/2016 till 10/11/2016.  9. Started gemzar single agent weekly 2 weeks on and 1 week off 10/25/2016.  10.  Started weekly Adriamycin single agent on February 14, 2017     HISTORY OF PRESENT ILLNESS: The patient is a very pleasant 75-year-old female  with past medical history significant for invasive ductal carcinoma of the  right breast diagnosed 06/21/2012. The patient is status post right mastectomy  on 07/13/2012 with lymph node dissection, tumor greatest dimension was 2.5 cm,  2 out of 8 positive axial lymph nodes, clear surgical margins. The patient was  started on adjuvant chemotherapy using Taxotere and Cytoxan on 10/08/2012. The  patient completed her treatment on 12/10/2012. The patient was started on  adjuvant hormone treatment using Arimidex 1 mg p.o. daily 01/10/2013. The  patient presented with right chest wall mass. Biopsy done on  04/17/2015  revealed relapsed high-grade ductal carcinoma. She had CAT scan of the chest,  abdomen, and pelvis that revealed disease in the right chest wall as well as  right axilla. The patient was enrolled with [employer]  Aromasin with or without  Entinostat on 05/08/2015. The patient had progressive disease documented on  scans done 04/22/2016 with liver metastases. The patient was started on  Faslodex Ibrance on 05/02/2016. Repeat CT scan done 7/22/2016 showed continued progression of disease, and the patient's treatment was changed to carbo/gemzar 2 weeks on, 1 week off. Completed 5 cycles on 10/11/2016. shw started Gemzar single agent 10/25/2016.  Repeated CAT scan done February 3, 2017 showed progressive liver metastases.  Patient was switched to weekly Adriamycin started on February 14, 2017.  The patient is here today for cycle #1, day 22.      SUBJECTIVE: The patient has been doing fairly well. She denies abdominal pain or discomfort. She is not having to take her Norco at all. Arthritis symptoms are her biggest aggravation. She is eating and drinking well. She has no cough or shortness of breath. She denies nausea or vomiting following chemotherapy. She continues to be active, with only minimal fatigue. She denies fevers, chills, or night sweats.     PAST MEDICAL HISTORY/SOCIAL HISTORY/FAMILY HISTORY: Unchanged from my prior documentation done on 10/2/2012.     Review of Systems   Constitutional: Positive for fatigue. Negative for activity change, chills, fever and unexpected weight change.   HENT: Negative for hearing loss, mouth sores, nosebleeds, sore throat and trouble swallowing.    Eyes: Negative for visual disturbance.   Respiratory: Negative for chest tightness, shortness of breath and wheezing.    Cardiovascular: Negative for chest pain, palpitations and leg swelling.   Gastrointestinal: Negative for abdominal distention, abdominal pain, blood in stool, constipation, diarrhea, nausea, rectal pain  "and vomiting.   Endocrine: Negative for cold intolerance and heat intolerance.   Genitourinary: Negative for difficulty urinating, dysuria, frequency and urgency.   Musculoskeletal: Positive for arthralgias. Negative for back pain, gait problem and myalgias.   Skin: Negative for rash.   Neurological: Negative for dizziness, tremors, syncope, weakness, light-headedness, numbness and headaches.   Hematological: Negative for adenopathy. Does not bruise/bleed easily.   Psychiatric/Behavioral: Negative for confusion, sleep disturbance and suicidal ideas. The patient is not nervous/anxious.          Current Outpatient Prescriptions:   •  amLODIPine (NORVASC) 10 MG tablet, Take 10 mg by mouth daily., Disp: , Rfl:   •  hydrALAZINE (APRESOLINE) 25 MG tablet, Take 25 mg by mouth 3 (three) times a day., Disp: , Rfl:   •  HYDROcodone-acetaminophen (NORCO) 5-325 MG per tablet, Take 1 tablet by mouth Every 8 (Eight) Hours As Needed for moderate pain (4-6)., Disp: 90 tablet, Rfl: 0  •  lidocaine-prilocaine (EMLA) 2.5-2.5 % cream, Apply  topically As Needed for mild pain (1-3) (prior to port access)., Disp: 30 g, Rfl: 2  •  Multiple Vitamin (TAB-A-TESS) tablet, Take 1 tablet by mouth daily., Disp: , Rfl: 0  •  quinapril (ACCUPRIL) 10 MG tablet, Take 10 mg by mouth every night., Disp: , Rfl:   •  VENTOLIN  (90 BASE) MCG/ACT inhaler, Inhale 2 puffs every 6 (six) hours as needed., Disp: , Rfl: 0    PHYSICAL EXAMINATION:   Visit Vitals   • /78   • Pulse 96   • Temp 97.5 °F (36.4 °C) (Temporal Artery )   • Resp 20   • Ht 63.5\" (161.3 cm)   • Wt 174 lb (78.9 kg)   • BMI 30.34 kg/m2      ECOG Performance Status: 1  General Appearance:  alert, cooperative, no apparent distress and appears stated age   Neurologic/Psychiatric: A&O x 3, gait steady, appropriate affect, strength 5/5 in all muscle groups   HEENT:  Normocephalic, without obvious abnormality, mucous membranes moist   Neck: Supple, symmetrical, trachea midline, no " adenopathy;  No thyromegaly, masses, or tenderness   Lungs:   Clear to auscultation bilaterally; respirations regular, even, and unlabored bilaterally   Heart:  Regular rate and rhythm, no murmurs appreciated   Abdomen:   Soft, non-tender, non-distended and no organomegaly   Lymph nodes: No cervical, supraclavicular, inguinal or axillary adenopathy noted   Extremities: Normal, atraumatic; no clubbing, cyanosis, or edema    Skin: No rashes, ulcers, or suspicious lesions noted     Infusion on 02/28/2017   Component Date Value Ref Range Status   • WBC 02/28/2017 5.50  3.50 - 10.80 10*3/mm3 Final   • RBC 02/28/2017 3.62* 3.89 - 5.14 10*6/mm3 Final   • Hemoglobin 02/28/2017 9.9* 11.5 - 15.5 g/dL Final   • Hematocrit 02/28/2017 31.8* 34.5 - 44.0 % Final   • RDW 02/28/2017 23.7* 11.3 - 14.5 % Final   • MCV 02/28/2017 87.7  80.0 - 99.0 fL Final   • MCH 02/28/2017 27.3  27.0 - 31.0 pg Final   • MCHC 02/28/2017 31.1* 32.0 - 36.0 g/dL Final   • MPV 02/28/2017 6.9  6.0 - 12.0 fL Final   • Platelets 02/28/2017 271  150 - 450 10*3/mm3 Final   • Neutrophil % 02/28/2017 65.9  41.0 - 71.0 % Final   • Lymphocyte % 02/28/2017 27.7  24.0 - 44.0 % Final   • Monocyte % 02/28/2017 6.4  0.0 - 12.0 % Final   • Neutrophils, Absolute 02/28/2017 3.60  1.50 - 8.30 10*3/mm3 Final   • Lymphocytes, Absolute 02/28/2017 1.50  0.60 - 4.80 10*3/mm3 Final   • Monocytes, Absolute 02/28/2017 0.40  0.00 - 1.00 10*3/mm3 Final        No results found.    ASSESSMENT: The patient is a very pleasant 75-year-old female with right breast  cancer.     PROBLEM LIST:  1. T2N1M0 invasive ductal carcinoma of the right breast, tumor greatest  dimension 2.5 cm, estrogen receptor strongly positive, progesterone receptor  intermediate, HER-2/jean marie negative by IHC score of 0, 2 out of 8 positive  axillary lymph nodes.   2. Status post right mastectomy with axillary lymph node dissection done by  Dr. Heart on 07/30/2012.   3. Status post 4 cycles of adjuvant chemotherapy  using Taxotere and Cytoxan  from 10/08/2012 until 12/10/2012.   4. Took Arimidex 1 mg p.o. daily from 01/10/2013 until 04/28/2015.   5. Relapsed disease documented on CAT scan of the chest, abdomen, and pelvis  done 04/27/2015. Revealed right chest wall mass as well as right axillary  lymphadenopathy. Biopsy done 04/17/2015 revealed invasive ductal carcinoma.   6. Enrolled on [employer]  protocol, Aromasin with placebo versus Aromasin and  Entinostat, 05/08/2015.   7. Stopped Aromasin 04/25/2016 secondary to new liver metastases documented on  CAT scan 04/22/2016.   8. Completed adjuvant radiation treatment to the right chest wall 12/09/2015.  9. Status post right axillary dissection done by Dr. Heart 03/14/2016.   10. Progressive disease documented on CAT scan done 04/22/2016 with new liver  metastases.   11. Started Faslodex Ibrance 05/02/2016.  12. Neutropenia secondary Ibrance. The patient is on 100 mg, 3 weeks on and 1  week off.   13. Rheumatoid arthritis.   14. Worsening metastatic disease documented on CT scans done on 07/25/2016.  15. Started on carboplatin AUC 2 with gemzar given two weeks on, one week off. She is status post three cycle of treatment.   16. Mixed response to treatment documented on CAT scans done 11/15/2016.  17. On gemzar single agent, status post 6 cycles.  18.  Progressive disease documented on CAT scan done February 4, 2017.  19.  Started weekly Adriamycin February 14, 2017.     PLAN:  1. I would proceed with treatment as scheduled today, cycle # 1 day 22.  2. The patient will follow-up with me in 2 weeks to evaluate for treatment tolerance.  3. She will continue use of EMLA cream prior to port access.  4. The patient will continue to hold her methotrexate as she is on active cancer treatment.  5. We will continue to monitor the patient's blood counts, kidney function, and liver function throughout treatment.   6. We will continue Norco 5/325 mg 1 tablet taken every 6 hours as needed for  cancer-related pain. She has not been needing this medication recently, but has it to be used if needed.   7.  I will do a follow-up CT scan after treatment #8. This will be 2 month follow up scans.   8.  We reviewed the side effects of this regimen including nausea, fatigue, myelosuppression, infusion reaction, cardiotoxicity, myelodysplasia, alopecia, and constipation or diarrhea.    Eliza Heart, APRN  3/7/2017

## 2017-03-14 ENCOUNTER — INFUSION (OUTPATIENT)
Dept: ONCOLOGY | Facility: HOSPITAL | Age: 77
End: 2017-03-14

## 2017-03-14 VITALS
BODY MASS INDEX: 29.02 KG/M2 | HEART RATE: 109 BPM | WEIGHT: 170 LBS | DIASTOLIC BLOOD PRESSURE: 61 MMHG | TEMPERATURE: 98.2 F | HEIGHT: 64 IN | RESPIRATION RATE: 20 BRPM | SYSTOLIC BLOOD PRESSURE: 120 MMHG

## 2017-03-14 DIAGNOSIS — R91.8 LUNG NODULES: Primary | ICD-10-CM

## 2017-03-14 DIAGNOSIS — C50.111 MALIGNANT NEOPLASM OF CENTRAL PORTION OF RIGHT FEMALE BREAST (HCC): ICD-10-CM

## 2017-03-14 LAB
ALBUMIN SERPL-MCNC: 3.8 G/DL (ref 3.2–4.8)
ALBUMIN/GLOB SERPL: 1.2 G/DL (ref 1.5–2.5)
ALP SERPL-CCNC: 100 U/L (ref 25–100)
ALT SERPL W P-5'-P-CCNC: 24 U/L (ref 7–40)
ANION GAP SERPL CALCULATED.3IONS-SCNC: 6 MMOL/L (ref 3–11)
AST SERPL-CCNC: 49 U/L (ref 0–33)
BILIRUB SERPL-MCNC: 0.3 MG/DL (ref 0.3–1.2)
BUN BLD-MCNC: 16 MG/DL (ref 9–23)
BUN/CREAT SERPL: 16 (ref 7–25)
CALCIUM SPEC-SCNC: 9.9 MG/DL (ref 8.7–10.4)
CHLORIDE SERPL-SCNC: 104 MMOL/L (ref 99–109)
CO2 SERPL-SCNC: 31 MMOL/L (ref 20–31)
CREAT BLD-MCNC: 1 MG/DL (ref 0.6–1.3)
ERYTHROCYTE [DISTWIDTH] IN BLOOD BY AUTOMATED COUNT: 25 % (ref 11.3–14.5)
GFR SERPL CREATININE-BSD FRML MDRD: 65 ML/MIN/1.73
GLOBULIN UR ELPH-MCNC: 3.2 GM/DL
GLUCOSE BLD-MCNC: 90 MG/DL (ref 70–100)
HCT VFR BLD AUTO: 35 % (ref 34.5–44)
HGB BLD-MCNC: 10.9 G/DL (ref 11.5–15.5)
LYMPHOCYTES # BLD AUTO: 1.3 10*3/MM3 (ref 0.6–4.8)
LYMPHOCYTES NFR BLD AUTO: 47.9 % (ref 24–44)
MCH RBC QN AUTO: 27.6 PG (ref 27–31)
MCHC RBC AUTO-ENTMCNC: 31.1 G/DL (ref 32–36)
MCV RBC AUTO: 89 FL (ref 80–99)
MONOCYTES # BLD AUTO: 0.2 10*3/MM3 (ref 0–1)
MONOCYTES NFR BLD AUTO: 8.2 % (ref 0–12)
NEUTROPHILS # BLD AUTO: 1.2 10*3/MM3 (ref 1.5–8.3)
NEUTROPHILS NFR BLD AUTO: 43.9 % (ref 41–71)
PLATELET # BLD AUTO: 221 10*3/MM3 (ref 150–450)
PMV BLD AUTO: 7.4 FL (ref 6–12)
POTASSIUM BLD-SCNC: 3.8 MMOL/L (ref 3.5–5.5)
PROT SERPL-MCNC: 7 G/DL (ref 5.7–8.2)
RBC # BLD AUTO: 3.93 10*6/MM3 (ref 3.89–5.14)
SODIUM BLD-SCNC: 141 MMOL/L (ref 132–146)
WBC NRBC COR # BLD: 2.7 10*3/MM3 (ref 3.5–10.8)

## 2017-03-14 PROCEDURE — 25010000002 DEXAMETHASONE PER 1 MG: Performed by: INTERNAL MEDICINE

## 2017-03-14 PROCEDURE — 25010000002 DOXORUBICIN PER 10 MG: Performed by: INTERNAL MEDICINE

## 2017-03-14 PROCEDURE — 25010000002 PALONOSETRON PER 25 MCG: Performed by: INTERNAL MEDICINE

## 2017-03-14 PROCEDURE — 25010000002 HEPARIN FLUSH (PORCINE) 100 UNIT/ML SOLUTION: Performed by: INTERNAL MEDICINE

## 2017-03-14 PROCEDURE — 96375 TX/PRO/DX INJ NEW DRUG ADDON: CPT

## 2017-03-14 PROCEDURE — 96409 CHEMO IV PUSH SNGL DRUG: CPT

## 2017-03-14 PROCEDURE — 36415 COLL VENOUS BLD VENIPUNCTURE: CPT

## 2017-03-14 RX ORDER — DOXORUBICIN HYDROCHLORIDE 2 MG/ML
20 INJECTION, SOLUTION INTRAVENOUS ONCE
Status: COMPLETED | OUTPATIENT
Start: 2017-03-14 | End: 2017-03-14

## 2017-03-14 RX ORDER — PALONOSETRON 0.05 MG/ML
0.25 INJECTION, SOLUTION INTRAVENOUS ONCE
Status: COMPLETED | OUTPATIENT
Start: 2017-03-14 | End: 2017-03-14

## 2017-03-14 RX ORDER — SODIUM CHLORIDE 9 MG/ML
250 INJECTION, SOLUTION INTRAVENOUS ONCE
Status: DISCONTINUED | OUTPATIENT
Start: 2017-03-14 | End: 2017-03-14 | Stop reason: HOSPADM

## 2017-03-14 RX ORDER — SODIUM CHLORIDE 0.9 % (FLUSH) 0.9 %
10 SYRINGE (ML) INJECTION AS NEEDED
Status: CANCELLED | OUTPATIENT
Start: 2017-03-14

## 2017-03-14 RX ORDER — HYDROCODONE BITARTRATE AND ACETAMINOPHEN 5; 325 MG/1; MG/1
1 TABLET ORAL EVERY 8 HOURS PRN
Qty: 90 TABLET | Refills: 0 | Status: SHIPPED | OUTPATIENT
Start: 2017-03-14 | End: 2017-01-01

## 2017-03-14 RX ADMIN — DEXAMETHASONE SODIUM PHOSPHATE 12 MG: 4 INJECTION, SOLUTION INTRAMUSCULAR; INTRAVENOUS at 13:48

## 2017-03-14 RX ADMIN — DOXORUBICIN HYDROCHLORIDE 36 MG: 2 INJECTION, SOLUTION INTRAVENOUS at 14:28

## 2017-03-14 RX ADMIN — HEPARIN 500 UNITS: 100 SYRINGE at 14:37

## 2017-03-14 RX ADMIN — PALONOSETRON HYDROCHLORIDE 0.25 MG: 0.25 INJECTION INTRAVENOUS at 13:47

## 2017-03-21 ENCOUNTER — OFFICE VISIT (OUTPATIENT)
Dept: ONCOLOGY | Facility: CLINIC | Age: 77
End: 2017-03-21

## 2017-03-21 ENCOUNTER — INFUSION (OUTPATIENT)
Dept: ONCOLOGY | Facility: HOSPITAL | Age: 77
End: 2017-03-21

## 2017-03-21 VITALS
OXYGEN SATURATION: 90 % | WEIGHT: 173 LBS | HEART RATE: 105 BPM | HEIGHT: 64 IN | BODY MASS INDEX: 29.53 KG/M2 | DIASTOLIC BLOOD PRESSURE: 61 MMHG | SYSTOLIC BLOOD PRESSURE: 130 MMHG | RESPIRATION RATE: 20 BRPM | TEMPERATURE: 98.6 F

## 2017-03-21 DIAGNOSIS — R91.8 LUNG NODULES: ICD-10-CM

## 2017-03-21 DIAGNOSIS — C50.111 MALIGNANT NEOPLASM OF CENTRAL PORTION OF RIGHT FEMALE BREAST (HCC): Primary | ICD-10-CM

## 2017-03-21 LAB
ERYTHROCYTE [DISTWIDTH] IN BLOOD BY AUTOMATED COUNT: 25.4 % (ref 11.3–14.5)
HCT VFR BLD AUTO: 33.2 % (ref 34.5–44)
HGB BLD-MCNC: 9.8 G/DL (ref 11.5–15.5)
LYMPHOCYTES # BLD AUTO: 0.9 10*3/MM3 (ref 0.6–4.8)
LYMPHOCYTES NFR BLD AUTO: 46.7 % (ref 24–44)
MCH RBC QN AUTO: 27.1 PG (ref 27–31)
MCHC RBC AUTO-ENTMCNC: 29.4 G/DL (ref 32–36)
MCV RBC AUTO: 92.2 FL (ref 80–99)
MONOCYTES # BLD AUTO: 0.2 10*3/MM3 (ref 0–1)
MONOCYTES NFR BLD AUTO: 8 % (ref 0–12)
NEUTROPHILS # BLD AUTO: 0.9 10*3/MM3 (ref 1.5–8.3)
NEUTROPHILS NFR BLD AUTO: 45.3 % (ref 41–71)
PLATELET # BLD AUTO: 166 10*3/MM3 (ref 150–450)
PMV BLD AUTO: 7.2 FL (ref 6–12)
RBC # BLD AUTO: 3.61 10*6/MM3 (ref 3.89–5.14)
WBC NRBC COR # BLD: 2 10*3/MM3 (ref 3.5–10.8)

## 2017-03-21 PROCEDURE — 25010000002 HEPARIN FLUSH (PORCINE) 100 UNIT/ML SOLUTION: Performed by: INTERNAL MEDICINE

## 2017-03-21 PROCEDURE — 25010000002 PALONOSETRON PER 25 MCG: Performed by: INTERNAL MEDICINE

## 2017-03-21 PROCEDURE — 96367 TX/PROPH/DG ADDL SEQ IV INF: CPT

## 2017-03-21 PROCEDURE — 25010000002 DEXAMETHASONE PER 1 MG: Performed by: INTERNAL MEDICINE

## 2017-03-21 PROCEDURE — 96375 TX/PRO/DX INJ NEW DRUG ADDON: CPT

## 2017-03-21 PROCEDURE — 99215 OFFICE O/P EST HI 40 MIN: CPT | Performed by: INTERNAL MEDICINE

## 2017-03-21 PROCEDURE — 96409 CHEMO IV PUSH SNGL DRUG: CPT

## 2017-03-21 PROCEDURE — 25010000002 DOXORUBICIN PER 10 MG: Performed by: INTERNAL MEDICINE

## 2017-03-21 RX ORDER — DOXORUBICIN HYDROCHLORIDE 2 MG/ML
20 INJECTION, SOLUTION INTRAVENOUS ONCE
Status: COMPLETED | OUTPATIENT
Start: 2017-03-21 | End: 2017-03-21

## 2017-03-21 RX ORDER — SODIUM CHLORIDE 0.9 % (FLUSH) 0.9 %
10 SYRINGE (ML) INJECTION AS NEEDED
Status: DISCONTINUED | OUTPATIENT
Start: 2017-03-21 | End: 2017-03-21 | Stop reason: HOSPADM

## 2017-03-21 RX ORDER — SODIUM CHLORIDE 0.9 % (FLUSH) 0.9 %
10 SYRINGE (ML) INJECTION AS NEEDED
Status: CANCELLED | OUTPATIENT
Start: 2017-03-21

## 2017-03-21 RX ORDER — SODIUM CHLORIDE 9 MG/ML
250 INJECTION, SOLUTION INTRAVENOUS ONCE
Status: COMPLETED | OUTPATIENT
Start: 2017-03-21 | End: 2017-03-21

## 2017-03-21 RX ORDER — CHLORHEXIDINE GLUCONATE 0.12 MG/ML
RINSE ORAL
Refills: 0 | COMMUNITY
Start: 2017-03-13 | End: 2017-05-02

## 2017-03-21 RX ORDER — ALBUTEROL SULFATE 90 UG/1
2 AEROSOL, METERED RESPIRATORY (INHALATION) EVERY 6 HOURS PRN
Qty: 18 G | Refills: 5 | Status: SHIPPED | OUTPATIENT
Start: 2017-03-21

## 2017-03-21 RX ORDER — CLINDAMYCIN HYDROCHLORIDE 150 MG/1
CAPSULE ORAL
Refills: 0 | COMMUNITY
Start: 2017-03-13 | End: 2017-05-02

## 2017-03-21 RX ORDER — PALONOSETRON 0.05 MG/ML
0.25 INJECTION, SOLUTION INTRAVENOUS ONCE
Status: COMPLETED | OUTPATIENT
Start: 2017-03-21 | End: 2017-03-21

## 2017-03-21 RX ADMIN — HEPARIN 500 UNITS: 100 SYRINGE at 14:01

## 2017-03-21 RX ADMIN — Medication 10 ML: at 14:00

## 2017-03-21 RX ADMIN — SODIUM CHLORIDE 250 ML: 9 INJECTION, SOLUTION INTRAVENOUS at 13:27

## 2017-03-21 RX ADMIN — DOXORUBICIN HYDROCHLORIDE 36 MG: 2 INJECTION, SOLUTION INTRAVENOUS at 13:49

## 2017-03-21 RX ADMIN — DEXAMETHASONE SODIUM PHOSPHATE 12 MG: 4 INJECTION, SOLUTION INTRAMUSCULAR; INTRAVENOUS at 13:29

## 2017-03-21 RX ADMIN — PALONOSETRON HYDROCHLORIDE 0.25 MG: 0.25 INJECTION INTRAVENOUS at 13:28

## 2017-03-21 NOTE — PROGRESS NOTES
DATE OF VISIT: 3/21/2017    REASON FOR VISIT: Followup for   1. Breast cancer:   a) Initially presenting as stage IIB disease, T2N1M0, estrogen receptor  strongly positive, progesterone receptor intermediate, HER-2/jean marie negative.   b) Status post right mastectomy followed by adjuvant chemotherapy using  Taxotere and Cytoxan 4 cycles.   c) Took Arimidex 1 mg p.o. daily from 01/10/2013 until 04/28/2015.   2. Locally relapsed disease with right chest wall as well as axillary lymph  nodes biopsy proven metastatic disease done 04/17/2015.  3. Enrolled on clinical trial [employer]  randomizing patients for 2nd line  hormone treatment with Aromasin with placebo versus Aromasin with Entinostat.   4. Currently on Aromasin single agent.   5. Completed adjuvant radiation treatment by Dr. Benavides to the right chest  wall as well as right axillary lymph nodes 12/09/2015.   6. Currently metastatic disease with new liver metastases documented on CAT  scan 04/22/2016.   7. Started Faslodex Ibrance 05/02/2016.   8. Switched to Carbo/gemzar weekly secondary to progressive disease from 07/22/2016 till 10/11/2016.  9. Started gemzar single agent weekly 2 weeks on and 1 week off 10/25/2016.  10.  Started weekly Adriamycin single agent on February 14, 2017     HISTORY OF PRESENT ILLNESS: The patient is a very pleasant 75-year-old female  with past medical history significant for invasive ductal carcinoma of the  right breast diagnosed 06/21/2012. The patient is status post right mastectomy  on 07/13/2012 with lymph node dissection, tumor greatest dimension was 2.5 cm,  2 out of 8 positive axial lymph nodes, clear surgical margins. The patient was  started on adjuvant chemotherapy using Taxotere and Cytoxan on 10/08/2012. The  patient completed her treatment on 12/10/2012. The patient was started on  adjuvant hormone treatment using Arimidex 1 mg p.o. daily 01/10/2013. The  patient presented with right chest wall mass. Biopsy done on  04/17/2015  revealed relapsed high-grade ductal carcinoma. She had CAT scan of the chest,  abdomen, and pelvis that revealed disease in the right chest wall as well as  right axilla. The patient was enrolled with [employer]  Aromasin with or without  Entinostat on 05/08/2015. The patient had progressive disease documented on  scans done 04/22/2016 with liver metastases. The patient was started on  Faslodex Ibrance on 05/02/2016. Repeat CT scan done 7/22/2016 showed continued progression of disease, and the patient's treatment was changed to carbo/gemzar 2 weeks on, 1 week off. Completed 5 cycles on 10/11/2016. shw started Gemzar single agent 10/25/2016.  Repeated CAT scan done February 3, 2017 showed progressive liver metastases.  Patient was switched to weekly Adriamycin started on February 14, 2017.  The patient is here today for cycle #2, day 8.      SUBJECTIVE: The patient has been doing fairly well. She denies abdominal pain or discomfort. She is not having to take her Norco at all. Arthritis symptoms are her biggest aggravation. She is eating and drinking well. She has no cough or shortness of breath. She denies nausea or vomiting following chemotherapy. She continues to be active, with only minimal fatigue. She denies fevers, chills, or night sweats.     PAST MEDICAL HISTORY/SOCIAL HISTORY/FAMILY HISTORY: Unchanged from my prior documentation done on 10/2/2012.     Review of Systems   Constitutional: Positive for fatigue. Negative for activity change, chills, fever and unexpected weight change.   HENT: Negative for hearing loss, mouth sores, nosebleeds, sore throat and trouble swallowing.    Eyes: Negative for visual disturbance.   Respiratory: Negative for chest tightness, shortness of breath and wheezing.    Cardiovascular: Negative for chest pain, palpitations and leg swelling.   Gastrointestinal: Negative for abdominal distention, abdominal pain, blood in stool, constipation, diarrhea, nausea, rectal pain  "and vomiting.   Endocrine: Negative for cold intolerance and heat intolerance.   Genitourinary: Negative for difficulty urinating, dysuria, frequency and urgency.   Musculoskeletal: Positive for arthralgias. Negative for back pain, gait problem and myalgias.   Skin: Negative for rash.   Neurological: Negative for dizziness, tremors, syncope, weakness, light-headedness, numbness and headaches.   Hematological: Negative for adenopathy. Does not bruise/bleed easily.   Psychiatric/Behavioral: Negative for confusion, sleep disturbance and suicidal ideas. The patient is not nervous/anxious.          Current Outpatient Prescriptions:   •  amLODIPine (NORVASC) 10 MG tablet, Take 10 mg by mouth daily., Disp: , Rfl:   •  chlorhexidine (PERIDEX) 0.12 % solution, use as directed, Disp: , Rfl: 0  •  clindamycin (CLEOCIN) 150 MG capsule, take 1 capsule by mouth every 6 hours until finished, Disp: , Rfl: 0  •  hydrALAZINE (APRESOLINE) 25 MG tablet, Take 25 mg by mouth 3 (three) times a day., Disp: , Rfl:   •  HYDROcodone-acetaminophen (NORCO) 5-325 MG per tablet, Take 1 tablet by mouth Every 8 (Eight) Hours As Needed for Moderate Pain (4-6)., Disp: 90 tablet, Rfl: 0  •  lidocaine-prilocaine (EMLA) 2.5-2.5 % cream, Apply  topically As Needed for mild pain (1-3) (prior to port access)., Disp: 30 g, Rfl: 2  •  Multiple Vitamin (TAB-A-TESS) tablet, Take 1 tablet by mouth daily., Disp: , Rfl: 0  •  quinapril (ACCUPRIL) 10 MG tablet, Take 10 mg by mouth every night., Disp: , Rfl:   •  VENTOLIN  (90 BASE) MCG/ACT inhaler, Inhale 2 puffs every 6 (six) hours as needed., Disp: , Rfl: 0    PHYSICAL EXAMINATION:   Visit Vitals   • /61   • Pulse 105   • Temp 98.6 °F (37 °C)   • Resp 20   • Ht 63.5\" (161.3 cm)   • Wt 173 lb (78.5 kg)   • SpO2 90%   • BMI 30.16 kg/m2      ECOG Performance Status: 1  General Appearance:  alert, cooperative, no apparent distress and appears stated age   Neurologic/Psychiatric: A&O x 3, gait steady, " appropriate affect, strength 5/5 in all muscle groups   HEENT:  Normocephalic, without obvious abnormality, mucous membranes moist   Neck: Supple, symmetrical, trachea midline, no adenopathy;  No thyromegaly, masses, or tenderness   Lungs:   Clear to auscultation bilaterally; respirations regular, even, and unlabored bilaterally   Heart:  Regular rate and rhythm, no murmurs appreciated   Abdomen:   Soft, non-tender, non-distended and no organomegaly   Lymph nodes: No cervical, supraclavicular, inguinal or axillary adenopathy noted   Extremities: Normal, atraumatic; no clubbing, cyanosis, or edema    Skin: No rashes, ulcers, or suspicious lesions noted     Infusion on 03/14/2017   Component Date Value Ref Range Status   • Glucose 03/14/2017 90  70 - 100 mg/dL Final   • BUN 03/14/2017 16  9 - 23 mg/dL Final   • Creatinine 03/14/2017 1.00  0.60 - 1.30 mg/dL Final   • Sodium 03/14/2017 141  132 - 146 mmol/L Final   • Potassium 03/14/2017 3.8  3.5 - 5.5 mmol/L Final   • Chloride 03/14/2017 104  99 - 109 mmol/L Final   • CO2 03/14/2017 31.0  20.0 - 31.0 mmol/L Final   • Calcium 03/14/2017 9.9  8.7 - 10.4 mg/dL Final   • Total Protein 03/14/2017 7.0  5.7 - 8.2 g/dL Final   • Albumin 03/14/2017 3.80  3.20 - 4.80 g/dL Final   • ALT (SGPT) 03/14/2017 24  7 - 40 U/L Final   • AST (SGOT) 03/14/2017 49* 0 - 33 U/L Final   • Alkaline Phosphatase 03/14/2017 100  25 - 100 U/L Final   • Total Bilirubin 03/14/2017 0.3  0.3 - 1.2 mg/dL Final   • eGFR   Amer 03/14/2017 65  >60 mL/min/1.73 Final   • Globulin 03/14/2017 3.2  gm/dL Final   • A/G Ratio 03/14/2017 1.2* 1.5 - 2.5 g/dL Final   • BUN/Creatinine Ratio 03/14/2017 16.0  7.0 - 25.0 Final   • Anion Gap 03/14/2017 6.0  3.0 - 11.0 mmol/L Final   • WBC 03/14/2017 2.70* 3.50 - 10.80 10*3/mm3 Final   • RBC 03/14/2017 3.93  3.89 - 5.14 10*6/mm3 Final   • Hemoglobin 03/14/2017 10.9* 11.5 - 15.5 g/dL Final   • Hematocrit 03/14/2017 35.0  34.5 - 44.0 % Final   • RDW 03/14/2017 25.0*  11.3 - 14.5 % Final   • MCV 03/14/2017 89.0  80.0 - 99.0 fL Final   • MCH 03/14/2017 27.6  27.0 - 31.0 pg Final   • MCHC 03/14/2017 31.1* 32.0 - 36.0 g/dL Final   • MPV 03/14/2017 7.4  6.0 - 12.0 fL Final   • Platelets 03/14/2017 221  150 - 450 10*3/mm3 Final   • Neutrophil % 03/14/2017 43.9  41.0 - 71.0 % Final   • Lymphocyte % 03/14/2017 47.9* 24.0 - 44.0 % Final   • Monocyte % 03/14/2017 8.2  0.0 - 12.0 % Final   • Neutrophils, Absolute 03/14/2017 1.20* 1.50 - 8.30 10*3/mm3 Final   • Lymphocytes, Absolute 03/14/2017 1.30  0.60 - 4.80 10*3/mm3 Final   • Monocytes, Absolute 03/14/2017 0.20  0.00 - 1.00 10*3/mm3 Final        No results found.    ASSESSMENT: The patient is a very pleasant 75-year-old female with right breast  cancer.     PROBLEM LIST:  1. T2N1M0 invasive ductal carcinoma of the right breast, tumor greatest  dimension 2.5 cm, estrogen receptor strongly positive, progesterone receptor  intermediate, HER-2/jean marie negative by IHC score of 0, 2 out of 8 positive  axillary lymph nodes.   2. Status post right mastectomy with axillary lymph node dissection done by  Dr. Heart on 07/30/2012.   3. Status post 4 cycles of adjuvant chemotherapy using Taxotere and Cytoxan  from 10/08/2012 until 12/10/2012.   4. Took Arimidex 1 mg p.o. daily from 01/10/2013 until 04/28/2015.   5. Relapsed disease documented on CAT scan of the chest, abdomen, and pelvis  done 04/27/2015. Revealed right chest wall mass as well as right axillary  lymphadenopathy. Biopsy done 04/17/2015 revealed invasive ductal carcinoma.   6. Enrolled on [employer]  protocol, Aromasin with placebo versus Aromasin and  Entinostat, 05/08/2015.   7. Stopped Aromasin 04/25/2016 secondary to new liver metastases documented on  CAT scan 04/22/2016.   8. Completed adjuvant radiation treatment to the right chest wall 12/09/2015.  9. Status post right axillary dissection done by Dr. Heart 03/14/2016.   10. Progressive disease documented on CAT scan done 04/22/2016  with new liver  metastases.   11. Started Faslodex Ibrance 05/02/2016.  12. Neutropenia secondary Ibrance. The patient is on 100 mg, 3 weeks on and 1  week off.   13. Rheumatoid arthritis.   14. Worsening metastatic disease documented on CT scans done on 07/25/2016.  15. Started on carboplatin AUC 2 with gemzar given two weeks on, one week off. She is status post three cycle of treatment.   16. Mixed response to treatment documented on CAT scans done 11/15/2016.  17. On gemzar single agent, status post 6 cycles.  18.  Progressive disease documented on CAT scan done February 4, 2017.  19.  Started weekly Adriamycin February 14, 2017.     PLAN:  1. I would proceed with treatment as scheduled today.  2. The patient will follow-up with me in 2 weeks to evaluate for treatment tolerance.  3. She will continue use of EMLA cream prior to port access.  4. The patient will continue to hold her methotrexate as she is on active cancer treatment.  5. We will continue to monitor the patient's blood counts, kidney function, and liver function throughout treatment.   6. We will continue Norco 5/325 mg 1 tablet taken every 6 hours as needed for cancer-related pain. She has not been needing this medication recently, but has it to be used if needed.   7.  I will do a follow-up CT scan prior to return. This will be 2 month follow up scans.   8.  We reviewed the side effects of this regimen including nausea, fatigue, myelosuppression, infusion reaction, cardiotoxicity, myelodysplasia, alopecia, and constipation or diarrhea.    France Chun M.D.  3/21/2017

## 2017-03-28 ENCOUNTER — INFUSION (OUTPATIENT)
Dept: ONCOLOGY | Facility: HOSPITAL | Age: 77
End: 2017-03-28

## 2017-03-28 ENCOUNTER — HOSPITAL ENCOUNTER (OUTPATIENT)
Dept: CT IMAGING | Facility: HOSPITAL | Age: 77
Discharge: HOME | End: 2017-03-28
Attending: INTERNAL MEDICINE | Admitting: INTERNAL MEDICINE

## 2017-03-28 VITALS
RESPIRATION RATE: 18 BRPM | WEIGHT: 176 LBS | TEMPERATURE: 98.9 F | BODY MASS INDEX: 30.05 KG/M2 | HEART RATE: 109 BPM | DIASTOLIC BLOOD PRESSURE: 58 MMHG | HEIGHT: 64 IN | SYSTOLIC BLOOD PRESSURE: 130 MMHG

## 2017-03-28 DIAGNOSIS — C50.111 MALIGNANT NEOPLASM OF CENTRAL PORTION OF RIGHT FEMALE BREAST (HCC): Primary | ICD-10-CM

## 2017-03-28 DIAGNOSIS — R91.8 LUNG NODULES: ICD-10-CM

## 2017-03-28 DIAGNOSIS — C50.111 MALIGNANT NEOPLASM OF CENTRAL PORTION OF RIGHT FEMALE BREAST (HCC): ICD-10-CM

## 2017-03-28 LAB
ALBUMIN SERPL-MCNC: 3.9 G/DL (ref 3.2–4.8)
ALBUMIN/GLOB SERPL: 1.4 G/DL (ref 1.5–2.5)
ALP SERPL-CCNC: 157 U/L (ref 25–100)
ALT SERPL W P-5'-P-CCNC: 36 U/L (ref 7–40)
ANION GAP SERPL CALCULATED.3IONS-SCNC: 6 MMOL/L (ref 3–11)
AST SERPL-CCNC: 65 U/L (ref 0–33)
BASOPHILS # BLD AUTO: 0.03 10*3/MM3 (ref 0–0.2)
BASOPHILS NFR BLD AUTO: 1 % (ref 0–1)
BILIRUB SERPL-MCNC: 0.3 MG/DL (ref 0.3–1.2)
BUN BLD-MCNC: 14 MG/DL (ref 9–23)
BUN/CREAT SERPL: 23.3 (ref 7–25)
CALCIUM SPEC-SCNC: 9.1 MG/DL (ref 8.7–10.4)
CHLORIDE SERPL-SCNC: 104 MMOL/L (ref 99–109)
CO2 SERPL-SCNC: 30 MMOL/L (ref 20–31)
CREAT BLD-MCNC: 0.6 MG/DL (ref 0.6–1.3)
DEPRECATED RDW RBC AUTO: 76.2 FL (ref 37–54)
EOSINOPHIL # BLD AUTO: 0.04 10*3/MM3 (ref 0.1–0.3)
EOSINOPHIL NFR BLD AUTO: 1.4 % (ref 0–3)
ERYTHROCYTE [DISTWIDTH] IN BLOOD BY AUTOMATED COUNT: 22.2 % (ref 11.3–14.5)
GFR SERPL CREATININE-BSD FRML MDRD: 117 ML/MIN/1.73
GLOBULIN UR ELPH-MCNC: 2.8 GM/DL
GLUCOSE BLD-MCNC: 99 MG/DL (ref 70–100)
HCT VFR BLD AUTO: 37.3 % (ref 34.5–44)
HGB BLD-MCNC: 11.6 G/DL (ref 11.5–15.5)
IMM GRANULOCYTES # BLD: 0 10*3/MM3 (ref 0–0.03)
IMM GRANULOCYTES NFR BLD: 0 % (ref 0–0.6)
LYMPHOCYTES # BLD AUTO: 1.25 10*3/MM3 (ref 0.6–4.8)
LYMPHOCYTES NFR BLD AUTO: 43 % (ref 24–44)
MCH RBC QN AUTO: 28.9 PG (ref 27–31)
MCHC RBC AUTO-ENTMCNC: 31.1 G/DL (ref 32–36)
MCV RBC AUTO: 92.8 FL (ref 80–99)
MONOCYTES # BLD AUTO: 0.28 10*3/MM3 (ref 0–1)
MONOCYTES NFR BLD AUTO: 9.6 % (ref 0–12)
NEUTROPHILS # BLD AUTO: 1.31 10*3/MM3 (ref 1.5–8.3)
NEUTROPHILS NFR BLD AUTO: 45 % (ref 41–71)
PLATELET # BLD AUTO: 219 10*3/MM3 (ref 150–450)
PMV BLD AUTO: 9.9 FL (ref 6–12)
POTASSIUM BLD-SCNC: 4.1 MMOL/L (ref 3.5–5.5)
PROT SERPL-MCNC: 6.7 G/DL (ref 5.7–8.2)
RBC # BLD AUTO: 4.02 10*6/MM3 (ref 3.89–5.14)
SODIUM BLD-SCNC: 140 MMOL/L (ref 132–146)
WBC NRBC COR # BLD: 2.91 10*3/MM3 (ref 3.5–10.8)

## 2017-03-28 PROCEDURE — 25010000002 DOXORUBICIN PER 10 MG: Performed by: INTERNAL MEDICINE

## 2017-03-28 PROCEDURE — 25010000002 HEPARIN FLUSH (PORCINE) 100 UNIT/ML SOLUTION: Performed by: INTERNAL MEDICINE

## 2017-03-28 PROCEDURE — 25010000002 PALONOSETRON PER 25 MCG: Performed by: INTERNAL MEDICINE

## 2017-03-28 PROCEDURE — 96375 TX/PRO/DX INJ NEW DRUG ADDON: CPT

## 2017-03-28 PROCEDURE — 96409 CHEMO IV PUSH SNGL DRUG: CPT

## 2017-03-28 PROCEDURE — 25010000002 DEXAMETHASONE PER 1 MG: Performed by: INTERNAL MEDICINE

## 2017-03-28 RX ORDER — PALONOSETRON 0.05 MG/ML
0.25 INJECTION, SOLUTION INTRAVENOUS ONCE
Status: COMPLETED | OUTPATIENT
Start: 2017-03-28 | End: 2017-03-28

## 2017-03-28 RX ORDER — DOXORUBICIN HYDROCHLORIDE 2 MG/ML
20 INJECTION, SOLUTION INTRAVENOUS ONCE
Status: COMPLETED | OUTPATIENT
Start: 2017-03-28 | End: 2017-03-28

## 2017-03-28 RX ORDER — SODIUM CHLORIDE 0.9 % (FLUSH) 0.9 %
10 SYRINGE (ML) INJECTION AS NEEDED
Status: DISCONTINUED | OUTPATIENT
Start: 2017-03-28 | End: 2017-03-28 | Stop reason: HOSPADM

## 2017-03-28 RX ORDER — SODIUM CHLORIDE 0.9 % (FLUSH) 0.9 %
10 SYRINGE (ML) INJECTION AS NEEDED
Status: CANCELLED | OUTPATIENT
Start: 2017-03-28

## 2017-03-28 RX ORDER — SODIUM CHLORIDE 9 MG/ML
250 INJECTION, SOLUTION INTRAVENOUS ONCE
Status: COMPLETED | OUTPATIENT
Start: 2017-03-28 | End: 2017-03-28

## 2017-03-28 RX ADMIN — DOXORUBICIN HYDROCHLORIDE 36 MG: 2 INJECTION, SOLUTION INTRAVENOUS at 13:30

## 2017-03-28 RX ADMIN — DEXAMETHASONE SODIUM PHOSPHATE 12 MG: 4 INJECTION, SOLUTION INTRAMUSCULAR; INTRAVENOUS at 13:10

## 2017-03-28 RX ADMIN — HEPARIN 500 UNITS: 100 SYRINGE at 13:44

## 2017-03-28 RX ADMIN — SODIUM CHLORIDE 250 ML: 9 INJECTION, SOLUTION INTRAVENOUS at 13:10

## 2017-03-28 RX ADMIN — IOPAMIDOL 100 ML: 612 INJECTION, SOLUTION INTRAVENOUS at 12:30

## 2017-03-28 RX ADMIN — PALONOSETRON HYDROCHLORIDE 0.25 MG: 0.25 INJECTION INTRAVENOUS at 13:10

## 2017-03-29 LAB — CREAT BLDA-MCNC: 0.7 MG/DL (ref 0.6–1.3)

## 2017-04-04 ENCOUNTER — OFFICE VISIT (OUTPATIENT)
Dept: ONCOLOGY | Facility: CLINIC | Age: 77
End: 2017-04-04

## 2017-04-04 ENCOUNTER — INFUSION (OUTPATIENT)
Dept: ONCOLOGY | Facility: HOSPITAL | Age: 77
End: 2017-04-04

## 2017-04-04 VITALS
TEMPERATURE: 97.4 F | DIASTOLIC BLOOD PRESSURE: 55 MMHG | RESPIRATION RATE: 20 BRPM | HEART RATE: 105 BPM | SYSTOLIC BLOOD PRESSURE: 133 MMHG | BODY MASS INDEX: 29.53 KG/M2 | WEIGHT: 173 LBS | HEIGHT: 64 IN

## 2017-04-04 DIAGNOSIS — C78.7 LIVER METASTASIS: ICD-10-CM

## 2017-04-04 DIAGNOSIS — R91.8 LUNG NODULES: ICD-10-CM

## 2017-04-04 DIAGNOSIS — C50.111 MALIGNANT NEOPLASM OF CENTRAL PORTION OF RIGHT FEMALE BREAST (HCC): Primary | ICD-10-CM

## 2017-04-04 LAB
ERYTHROCYTE [DISTWIDTH] IN BLOOD BY AUTOMATED COUNT: 25.7 % (ref 11.3–14.5)
HCT VFR BLD AUTO: 31.4 % (ref 34.5–44)
HGB BLD-MCNC: 9.5 G/DL (ref 11.5–15.5)
LYMPHOCYTES # BLD AUTO: 1 10*3/MM3 (ref 0.6–4.8)
LYMPHOCYTES NFR BLD AUTO: 49.2 % (ref 24–44)
MCH RBC QN AUTO: 27.5 PG (ref 27–31)
MCHC RBC AUTO-ENTMCNC: 30.4 G/DL (ref 32–36)
MCV RBC AUTO: 90.5 FL (ref 80–99)
MONOCYTES # BLD AUTO: 0.2 10*3/MM3 (ref 0–1)
MONOCYTES NFR BLD AUTO: 8.6 % (ref 0–12)
NEUTROPHILS # BLD AUTO: 0.9 10*3/MM3 (ref 1.5–8.3)
NEUTROPHILS NFR BLD AUTO: 42.2 % (ref 41–71)
PLATELET # BLD AUTO: 231 10*3/MM3 (ref 150–450)
PMV BLD AUTO: 7.5 FL (ref 6–12)
RBC # BLD AUTO: 3.47 10*6/MM3 (ref 3.89–5.14)
WBC NRBC COR # BLD: 2.1 10*3/MM3 (ref 3.5–10.8)

## 2017-04-04 PROCEDURE — 99214 OFFICE O/P EST MOD 30 MIN: CPT | Performed by: NURSE PRACTITIONER

## 2017-04-04 PROCEDURE — 96409 CHEMO IV PUSH SNGL DRUG: CPT

## 2017-04-04 PROCEDURE — 25010000002 PALONOSETRON PER 25 MCG: Performed by: INTERNAL MEDICINE

## 2017-04-04 PROCEDURE — 96375 TX/PRO/DX INJ NEW DRUG ADDON: CPT

## 2017-04-04 PROCEDURE — 25010000002 DEXAMETHASONE PER 1 MG: Performed by: INTERNAL MEDICINE

## 2017-04-04 PROCEDURE — 25010000002 HEPARIN FLUSH (PORCINE) 100 UNIT/ML SOLUTION: Performed by: INTERNAL MEDICINE

## 2017-04-04 PROCEDURE — 25010000002 DOXORUBICIN PER 10 MG: Performed by: INTERNAL MEDICINE

## 2017-04-04 RX ORDER — SODIUM CHLORIDE 0.9 % (FLUSH) 0.9 %
10 SYRINGE (ML) INJECTION AS NEEDED
Status: CANCELLED | OUTPATIENT
Start: 2017-04-04

## 2017-04-04 RX ORDER — AZITHROMYCIN 250 MG/1
TABLET, FILM COATED ORAL
Refills: 0 | COMMUNITY
Start: 2017-04-01 | End: 2017-05-02

## 2017-04-04 RX ORDER — PALONOSETRON 0.05 MG/ML
0.25 INJECTION, SOLUTION INTRAVENOUS ONCE
Status: COMPLETED | OUTPATIENT
Start: 2017-04-04 | End: 2017-04-04

## 2017-04-04 RX ORDER — DOXORUBICIN HYDROCHLORIDE 2 MG/ML
20 INJECTION, SOLUTION INTRAVENOUS ONCE
Status: COMPLETED | OUTPATIENT
Start: 2017-04-04 | End: 2017-04-04

## 2017-04-04 RX ORDER — SODIUM CHLORIDE 9 MG/ML
250 INJECTION, SOLUTION INTRAVENOUS ONCE
Status: DISCONTINUED | OUTPATIENT
Start: 2017-04-04 | End: 2017-04-04 | Stop reason: HOSPADM

## 2017-04-04 RX ADMIN — DOXORUBICIN HYDROCHLORIDE 36 MG: 2 INJECTION, SOLUTION INTRAVENOUS at 16:26

## 2017-04-04 RX ADMIN — PALONOSETRON HYDROCHLORIDE 0.25 MG: 0.25 INJECTION INTRAVENOUS at 16:09

## 2017-04-04 RX ADMIN — SODIUM CHLORIDE, PRESERVATIVE FREE 500 UNITS: 5 INJECTION INTRAVENOUS at 16:41

## 2017-04-04 RX ADMIN — DEXAMETHASONE SODIUM PHOSPHATE 12 MG: 4 INJECTION, SOLUTION INTRAMUSCULAR; INTRAVENOUS at 16:09

## 2017-04-04 NOTE — PROGRESS NOTES
DATE OF VISIT: 4/4/2017    REASON FOR VISIT: Followup for   1. Breast cancer:   a) Initially presenting as stage IIB disease, T2N1M0, estrogen receptor  strongly positive, progesterone receptor intermediate, HER-2/jean marie negative.   b) Status post right mastectomy followed by adjuvant chemotherapy using  Taxotere and Cytoxan 4 cycles.   c) Took Arimidex 1 mg p.o. daily from 01/10/2013 until 04/28/2015.   2. Locally relapsed disease with right chest wall as well as axillary lymph  nodes biopsy proven metastatic disease done 04/17/2015.  3. Enrolled on clinical trial [employer]  randomizing patients for 2nd line  hormone treatment with Aromasin with placebo versus Aromasin with Entinostat.   4. Currently on Aromasin single agent.   5. Completed adjuvant radiation treatment by Dr. Benavides to the right chest  wall as well as right axillary lymph nodes 12/09/2015.   6. Currently metastatic disease with new liver metastases documented on CAT  scan 04/22/2016.   7. Started Faslodex Ibrance 05/02/2016.   8. Switched to Carbo/gemzar weekly secondary to progressive disease from 07/22/2016 till 10/11/2016.  9. Started gemzar single agent weekly 2 weeks on and 1 week off 10/25/2016.  10.  Started weekly Adriamycin single agent on February 14, 2017     HISTORY OF PRESENT ILLNESS: The patient is a very pleasant 75-year-old female  with past medical history significant for invasive ductal carcinoma of the  right breast diagnosed 06/21/2012. The patient is status post right mastectomy  on 07/13/2012 with lymph node dissection, tumor greatest dimension was 2.5 cm,  2 out of 8 positive axial lymph nodes, clear surgical margins. The patient was  started on adjuvant chemotherapy using Taxotere and Cytoxan on 10/08/2012. The  patient completed her treatment on 12/10/2012. The patient was started on  adjuvant hormone treatment using Arimidex 1 mg p.o. daily 01/10/2013. The  patient presented with right chest wall mass. Biopsy done on  04/17/2015  revealed relapsed high-grade ductal carcinoma. She had CAT scan of the chest,  abdomen, and pelvis that revealed disease in the right chest wall as well as  right axilla. The patient was enrolled with [employer]  Aromasin with or without  Entinostat on 05/08/2015. The patient had progressive disease documented on  scans done 04/22/2016 with liver metastases. The patient was started on  Faslodex Ibrance on 05/02/2016. Repeat CT scan done 7/22/2016 showed continued progression of disease, and the patient's treatment was changed to carbo/gemzar 2 weeks on, 1 week off. Completed 5 cycles on 10/11/2016. shw started Gemzar single agent 10/25/2016.  Repeated CAT scan done February 3, 2017 showed progressive liver metastases.  Patient was switched to weekly Adriamycin started on February 14, 2017.  The patient is here today for cycle #2, day 22.      SUBJECTIVE: The patient has been doing fairly well. She has recently developed upper respiratory symptoms with runny nose, sneezing, and cough. She was seen in urgent treatment and given a prescription for Z-pack. She has one day left of this medication, and has noted some improvement. She is also taking Zyrtec for allergy type symptoms. She has had no fever or chills. She is otherwise doing fairly well with no significant side effects from treatment. She is eating and drinking without difficulty. She has no headaches or blurry vision.     PAST MEDICAL HISTORY/SOCIAL HISTORY/FAMILY HISTORY: Unchanged from my prior documentation done on 10/2/2012.     Review of Systems   Constitutional: Positive for fatigue. Negative for activity change, chills, fever and unexpected weight change.   HENT: Positive for rhinorrhea and sneezing. Negative for hearing loss, mouth sores, nosebleeds, sore throat and trouble swallowing.    Eyes: Negative for visual disturbance.   Respiratory: Positive for cough. Negative for chest tightness, shortness of breath and wheezing.     Cardiovascular: Negative for chest pain, palpitations and leg swelling.   Gastrointestinal: Negative for abdominal distention, abdominal pain, blood in stool, constipation, diarrhea, nausea, rectal pain and vomiting.   Endocrine: Negative for cold intolerance and heat intolerance.   Genitourinary: Negative for difficulty urinating, dysuria, frequency and urgency.   Musculoskeletal: Positive for arthralgias. Negative for back pain, gait problem and myalgias.   Skin: Negative for rash.   Neurological: Negative for dizziness, tremors, syncope, weakness, light-headedness, numbness and headaches.   Hematological: Negative for adenopathy. Does not bruise/bleed easily.   Psychiatric/Behavioral: Negative for confusion, sleep disturbance and suicidal ideas. The patient is not nervous/anxious.          Current Outpatient Prescriptions:   •  CETIRIZINE HCL PO, Take 1 tablet by mouth Daily., Disp: , Rfl:   •  albuterol (VENTOLIN HFA) 108 (90 BASE) MCG/ACT inhaler, Inhale 2 puffs Every 6 (Six) Hours As Needed for Wheezing or Shortness of Air., Disp: 18 g, Rfl: 5  •  amLODIPine (NORVASC) 10 MG tablet, Take 10 mg by mouth daily., Disp: , Rfl:   •  azithromycin (ZITHROMAX) 250 MG tablet, take 2 tablets by mouth on day 1 then 1 tablet on days 2 through 5, Disp: , Rfl: 0  •  chlorhexidine (PERIDEX) 0.12 % solution, use as directed, Disp: , Rfl: 0  •  clindamycin (CLEOCIN) 150 MG capsule, take 1 capsule by mouth every 6 hours until finished, Disp: , Rfl: 0  •  hydrALAZINE (APRESOLINE) 25 MG tablet, Take 25 mg by mouth 3 (three) times a day., Disp: , Rfl:   •  HYDROcodone-acetaminophen (NORCO) 5-325 MG per tablet, Take 1 tablet by mouth Every 8 (Eight) Hours As Needed for Moderate Pain (4-6)., Disp: 90 tablet, Rfl: 0  •  lidocaine-prilocaine (EMLA) 2.5-2.5 % cream, Apply  topically As Needed for mild pain (1-3) (prior to port access)., Disp: 30 g, Rfl: 2  •  Multiple Vitamin (TAB-A-TESS) tablet, Take 1 tablet by mouth daily., Disp: ,  "Rfl: 0  •  quinapril (ACCUPRIL) 10 MG tablet, Take 10 mg by mouth every night., Disp: , Rfl:     PHYSICAL EXAMINATION:   /55  Pulse 105  Temp 97.4 °F (36.3 °C) (Temporal Artery )   Resp 20  Ht 63.5\" (161.3 cm)  Wt 173 lb (78.5 kg)  BMI 30.16 kg/m2   ECOG Performance Status: 1  General Appearance:  alert, cooperative, no apparent distress and appears stated age   Neurologic/Psychiatric: A&O x 3, gait steady, appropriate affect, strength 5/5 in all muscle groups   HEENT:  Normocephalic, without obvious abnormality, mucous membranes moist   Neck: Supple, symmetrical, trachea midline, no adenopathy;  No thyromegaly, masses, or tenderness   Lungs:   Clear to auscultation bilaterally; respirations regular, even, and unlabored bilaterally   Heart:  Regular rate and rhythm, no murmurs appreciated   Abdomen:   Soft, non-tender, non-distended and no organomegaly   Lymph nodes: No cervical, supraclavicular, inguinal or axillary adenopathy noted   Extremities: Normal, atraumatic; no clubbing, cyanosis, or edema    Skin: No rashes, ulcers, or suspicious lesions noted     Hospital Outpatient Visit on 03/28/2017   Component Date Value Ref Range Status   • Glucose 03/28/2017 99  70 - 100 mg/dL Final   • BUN 03/28/2017 14  9 - 23 mg/dL Final   • Creatinine 03/28/2017 0.60  0.60 - 1.30 mg/dL Final   • Sodium 03/28/2017 140  132 - 146 mmol/L Final   • Potassium 03/28/2017 4.1  3.5 - 5.5 mmol/L Final   • Chloride 03/28/2017 104  99 - 109 mmol/L Final   • CO2 03/28/2017 30.0  20.0 - 31.0 mmol/L Final   • Calcium 03/28/2017 9.1  8.7 - 10.4 mg/dL Final   • Total Protein 03/28/2017 6.7  5.7 - 8.2 g/dL Final   • Albumin 03/28/2017 3.90  3.20 - 4.80 g/dL Final   • ALT (SGPT) 03/28/2017 36  7 - 40 U/L Final   • AST (SGOT) 03/28/2017 65* 0 - 33 U/L Final   • Alkaline Phosphatase 03/28/2017 157* 25 - 100 U/L Final   • Total Bilirubin 03/28/2017 0.3  0.3 - 1.2 mg/dL Final   • eGFR   Amer 03/28/2017 117  >60 mL/min/1.73 Final "   • Globulin 03/28/2017 2.8  gm/dL Final   • A/G Ratio 03/28/2017 1.4* 1.5 - 2.5 g/dL Final   • BUN/Creatinine Ratio 03/28/2017 23.3  7.0 - 25.0 Final   • Anion Gap 03/28/2017 6.0  3.0 - 11.0 mmol/L Final   • WBC 03/28/2017 2.91* 3.50 - 10.80 10*3/mm3 Final   • RBC 03/28/2017 4.02  3.89 - 5.14 10*6/mm3 Final   • Hemoglobin 03/28/2017 11.6  11.5 - 15.5 g/dL Final   • Hematocrit 03/28/2017 37.3  34.5 - 44.0 % Final   • MCV 03/28/2017 92.8  80.0 - 99.0 fL Final   • MCH 03/28/2017 28.9  27.0 - 31.0 pg Final   • MCHC 03/28/2017 31.1* 32.0 - 36.0 g/dL Final   • RDW 03/28/2017 22.2* 11.3 - 14.5 % Final   • RDW-SD 03/28/2017 76.2* 37.0 - 54.0 fl Final   • MPV 03/28/2017 9.9  6.0 - 12.0 fL Final   • Platelets 03/28/2017 219  150 - 450 10*3/mm3 Final   • Neutrophil % 03/28/2017 45.0  41.0 - 71.0 % Final   • Lymphocyte % 03/28/2017 43.0  24.0 - 44.0 % Final   • Monocyte % 03/28/2017 9.6  0.0 - 12.0 % Final   • Eosinophil % 03/28/2017 1.4  0.0 - 3.0 % Final   • Basophil % 03/28/2017 1.0  0.0 - 1.0 % Final   • Immature Grans % 03/28/2017 0.0  0.0 - 0.6 % Final   • Neutrophils, Absolute 03/28/2017 1.31* 1.50 - 8.30 10*3/mm3 Final   • Lymphocytes, Absolute 03/28/2017 1.25  0.60 - 4.80 10*3/mm3 Final   • Monocytes, Absolute 03/28/2017 0.28  0.00 - 1.00 10*3/mm3 Final   • Eosinophils, Absolute 03/28/2017 0.04* 0.10 - 0.30 10*3/mm3 Final   • Basophils, Absolute 03/28/2017 0.03  0.00 - 0.20 10*3/mm3 Final   • Immature Grans, Absolute 03/28/2017 0.00  0.00 - 0.03 10*3/mm3 Final   • Creatinine 03/28/2017 0.70  0.60 - 1.30 mg/dL Final    Serial Number: [id number]    : [id number]        Ct Chest With Contrast    Result Date: 3/28/2017  Narrative: EXAMINATION: CT CHEST W CONTRAST-  INDICATION: C50.111-Malignant neoplasm of central portion of right female breast; followup scan.  TECHNIQUE: CT scan of the chest was performed following intravenous contrast.  The radiation dose reduction device was turned on for each scan per the ALARA (As Low  as Reasonably Achievable) protocol.  COMPARISON: 02/02/2017.  FINDINGS: There is mild left axillary lymphadenopathy. There is a left axillary node which has a maximum measurement of approximately 1.7 x 3.7 cm and has a curvilinear configuration.  There is a smaller nodule measuring slightly less than 1 cm in the left axilla. These findings are stable.  On the right, there is a fluid collection anterior to the surgical clips in the axillary region measuring approximately 1.3 x 1.7 cm and probably representing a seroma. This is unchanged since the previous examination. There is mild middle mediastinal lymphadenopathy. The precarinal and subcarinal nodes are smaller than on the previous examination. There is no hilar adenopathy.  There is no pericardial or pleural effusion. Images displayed at lung window settings reveal no acute inflammatory process. There is a mass in the medial aspect of the left upper lobe which is slightly larger than on the previous exam.  There is no other pulmonary nodule.      Impression: 1. There is left axillary lymphadenopathy, stable. 2. Right axillary fluid collection probably representing a seroma, stable 3. Middle mediastinal adenopathy primarily in the precarinal and subcarinal spaces has improved. 4. The only sign of progression of disease is an area of increased nodularity in the left upper lobe medially abutting the cardiac silhouette.  There is a nodule in this region which is slightly larger than on the previous exam.  D:  03/28/2017 E:  03/28/2017   This report was finalized on 3/28/2017 2:17 PM by Dr. Aaron Meadows MD.      Ct Abdomen Pelvis With Contrast    Result Date: 3/28/2017  Narrative: EXAMINATION: CT ABDOMEN PELVIS W CONTRAST- 03/28/2017  INDICATION: f/u scan; C50.111-Malignant neoplasm of central portion of right female breast     TECHNIQUE: CT scan of the abdomen and pelvis was performed following intravenous contrast.  The radiation dose reduction device was turned on  for each scan per the ALARA (As Low as Reasonably Achievable) protocol.  COMPARISON: 02/02/2017  FINDINGS:  There is a low-density in the superolateral aspect of the right lobe of the liver.  There is a larger abnormality in the more central portion of the right lobe.  There is a third lesion in the posterior inferior aspect of the right lobe.  The spleen is normal. There is no adrenal or pancreatic mass. There is no renal mass, stone or obstruction. There is no ascites, aneurysm or retroperitoneal lymphadenopathy. There is no pelvic mass or fluid. There is no inguinal lymphadenopathy.      Impression: Widespread hepatic metastatic disease, unchanged since 02/02/2017.   D:  03/28/2017 E:  03/28/2017  This report was finalized on 3/28/2017 2:59 PM by Dr. Aaron Meadows MD.        ASSESSMENT: The patient is a very pleasant 75-year-old female with right breast  cancer.     PROBLEM LIST:  1. T2N1M0 invasive ductal carcinoma of the right breast, tumor greatest  dimension 2.5 cm, estrogen receptor strongly positive, progesterone receptor  intermediate, HER-2/jean marie negative by IHC score of 0, 2 out of 8 positive  axillary lymph nodes.   2. Status post right mastectomy with axillary lymph node dissection done by  Dr. Heart on 07/30/2012.   3. Status post 4 cycles of adjuvant chemotherapy using Taxotere and Cytoxan  from 10/08/2012 until 12/10/2012.   4. Took Arimidex 1 mg p.o. daily from 01/10/2013 until 04/28/2015.   5. Relapsed disease documented on CAT scan of the chest, abdomen, and pelvis  done 04/27/2015. Revealed right chest wall mass as well as right axillary  lymphadenopathy. Biopsy done 04/17/2015 revealed invasive ductal carcinoma.   6. Enrolled on [employer]CoG  protocol, Aromasin with placebo versus Aromasin and  Entinostat, 05/08/2015.   7. Stopped Aromasin 04/25/2016 secondary to new liver metastases documented on  CAT scan 04/22/2016.   8. Completed adjuvant radiation treatment to the right chest wall 12/09/2015.  9.  Status post right axillary dissection done by Dr. Heart 03/14/2016.   10. Progressive disease documented on CAT scan done 04/22/2016 with new liver  metastases.   11. Started Faslodex Ibrance 05/02/2016.  12. Neutropenia secondary Ibrance. The patient is on 100 mg, 3 weeks on and 1  week off.   13. Rheumatoid arthritis.   14. Worsening metastatic disease documented on CT scans done on 07/25/2016.  15. Started on carboplatin AUC 2 with gemzar given two weeks on, one week off. She is status post three cycle of treatment.   16. Mixed response to treatment documented on CAT scans done 11/15/2016.  17. On gemzar single agent, status post 6 cycles.  18.  Progressive disease documented on CAT scan done February 4, 2017.  19.  Started weekly Adriamycin February 14, 2017.     PLAN:  1. I reviewed the CAT scan results with the patient. I reviewed the films myself and with the patient. I also reviewed the results with Dr. Chun. I told her that her disease is essentially stable.   2. We will proceed with treatment as scheduled today.  3. The patient will follow-up with me in 2 weeks to evaluate for treatment tolerance.  4. She will continue use of EMLA cream prior to port access.  5. The patient will continue to hold her methotrexate as she is on active cancer treatment.  5. We will continue to monitor the patient's blood counts, kidney function, and liver function throughout treatment.   6. We will continue Norco 5/325 mg 1 tablet taken every 6 hours as needed for cancer-related pain. She has not been needing this medication recently, but has it to be used if needed.   7.  We will repeat CAT scans in 2 months. This will be due 05/28/2017.    8.  We reviewed the side effects of this regimen including nausea, fatigue, myelosuppression, infusion reaction, cardiotoxicity, myelodysplasia, alopecia, and constipation or diarrhea.  9. The patient will complete her current course of azithromycin, prescribed for upper respiratory  infection. I told her to call if she does not continue to improve, or if she develops fever, chills, or worsening cough.     Eliza Heart, APRN  4/4/2017

## 2017-04-05 ENCOUNTER — TELEPHONE (OUTPATIENT)
Dept: ONCOLOGY | Facility: CLINIC | Age: 77
End: 2017-04-05

## 2017-04-05 NOTE — TELEPHONE ENCOUNTER
Patient states still having some respiratory symptoms. Finished a z-toi that was prescribed by another physician. No fever. Advised that she may take some OTC cough/allergy medication to help with the symptoms, and to call back if she is not feeling better by the weekend

## 2017-04-05 NOTE — TELEPHONE ENCOUNTER
----- Message from Estrella ROBBY Avina sent at 4/5/2017  1:53 PM EDT -----  Regarding: BELEM-ANTIBOITIC  Contact: [phone]  Patient needs an antibiotic she said she was here yesterday and was told if she needs something stronger to call. Please call into [facility] on [address]. Call patient to let her know this was done.

## 2017-04-11 ENCOUNTER — INFUSION (OUTPATIENT)
Dept: ONCOLOGY | Facility: HOSPITAL | Age: 77
End: 2017-04-11

## 2017-04-11 VITALS
RESPIRATION RATE: 20 BRPM | BODY MASS INDEX: 29.71 KG/M2 | HEART RATE: 106 BPM | TEMPERATURE: 97.6 F | DIASTOLIC BLOOD PRESSURE: 67 MMHG | SYSTOLIC BLOOD PRESSURE: 138 MMHG | WEIGHT: 174 LBS | HEIGHT: 64 IN

## 2017-04-11 DIAGNOSIS — R91.8 LUNG NODULES: ICD-10-CM

## 2017-04-11 DIAGNOSIS — C50.111 MALIGNANT NEOPLASM OF CENTRAL PORTION OF RIGHT FEMALE BREAST (HCC): Primary | ICD-10-CM

## 2017-04-11 LAB
ALBUMIN SERPL-MCNC: 3.3 G/DL (ref 3.2–4.8)
ALBUMIN/GLOB SERPL: 1.3 G/DL (ref 1.5–2.5)
ALP SERPL-CCNC: 117 U/L (ref 25–100)
ALT SERPL W P-5'-P-CCNC: 25 U/L (ref 7–40)
ANION GAP SERPL CALCULATED.3IONS-SCNC: -1 MMOL/L (ref 3–11)
AST SERPL-CCNC: 54 U/L (ref 0–33)
BILIRUB SERPL-MCNC: 0.2 MG/DL (ref 0.3–1.2)
BUN BLD-MCNC: 12 MG/DL (ref 9–23)
BUN/CREAT SERPL: 17.1 (ref 7–25)
CALCIUM SPEC-SCNC: 8.8 MG/DL (ref 8.7–10.4)
CHLORIDE SERPL-SCNC: 108 MMOL/L (ref 99–109)
CO2 SERPL-SCNC: 32 MMOL/L (ref 20–31)
CREAT BLD-MCNC: 0.7 MG/DL (ref 0.6–1.3)
ERYTHROCYTE [DISTWIDTH] IN BLOOD BY AUTOMATED COUNT: 24.6 % (ref 11.3–14.5)
GFR SERPL CREATININE-BSD FRML MDRD: 98 ML/MIN/1.73
GLOBULIN UR ELPH-MCNC: 2.6 GM/DL
GLUCOSE BLD-MCNC: 123 MG/DL (ref 70–100)
HCT VFR BLD AUTO: 32.9 % (ref 34.5–44)
HGB BLD-MCNC: 10.3 G/DL (ref 11.5–15.5)
LYMPHOCYTES # BLD AUTO: 1.3 10*3/MM3 (ref 0.6–4.8)
LYMPHOCYTES NFR BLD AUTO: 49.7 % (ref 24–44)
MCH RBC QN AUTO: 28.2 PG (ref 27–31)
MCHC RBC AUTO-ENTMCNC: 31.2 G/DL (ref 32–36)
MCV RBC AUTO: 90.5 FL (ref 80–99)
MONOCYTES # BLD AUTO: 0.2 10*3/MM3 (ref 0–1)
MONOCYTES NFR BLD AUTO: 7.9 % (ref 0–12)
NEUTROPHILS # BLD AUTO: 1.1 10*3/MM3 (ref 1.5–8.3)
NEUTROPHILS NFR BLD AUTO: 42.4 % (ref 41–71)
PLATELET # BLD AUTO: 275 10*3/MM3 (ref 150–450)
PMV BLD AUTO: 7.1 FL (ref 6–12)
POTASSIUM BLD-SCNC: 4.3 MMOL/L (ref 3.5–5.5)
PROT SERPL-MCNC: 5.9 G/DL (ref 5.7–8.2)
RBC # BLD AUTO: 3.64 10*6/MM3 (ref 3.89–5.14)
SODIUM BLD-SCNC: 139 MMOL/L (ref 132–146)
WBC NRBC COR # BLD: 2.7 10*3/MM3 (ref 3.5–10.8)

## 2017-04-11 PROCEDURE — 25010000002 DOXORUBICIN PER 10 MG: Performed by: INTERNAL MEDICINE

## 2017-04-11 PROCEDURE — 25010000002 PALONOSETRON PER 25 MCG: Performed by: INTERNAL MEDICINE

## 2017-04-11 PROCEDURE — 25010000002 HEPARIN FLUSH (PORCINE) 100 UNIT/ML SOLUTION: Performed by: INTERNAL MEDICINE

## 2017-04-11 PROCEDURE — 96409 CHEMO IV PUSH SNGL DRUG: CPT

## 2017-04-11 PROCEDURE — 80053 COMPREHEN METABOLIC PANEL: CPT

## 2017-04-11 PROCEDURE — 25010000002 DEXAMETHASONE PER 1 MG: Performed by: INTERNAL MEDICINE

## 2017-04-11 PROCEDURE — 96375 TX/PRO/DX INJ NEW DRUG ADDON: CPT

## 2017-04-11 PROCEDURE — 85025 COMPLETE CBC W/AUTO DIFF WBC: CPT

## 2017-04-11 RX ORDER — LEVOFLOXACIN 500 MG/1
500 TABLET, FILM COATED ORAL DAILY
Qty: 7 TABLET | Refills: 0 | Status: SHIPPED | OUTPATIENT
Start: 2017-04-11 | End: 2017-04-18

## 2017-04-11 RX ORDER — DOXORUBICIN HYDROCHLORIDE 2 MG/ML
20 INJECTION, SOLUTION INTRAVENOUS ONCE
Status: COMPLETED | OUTPATIENT
Start: 2017-04-11 | End: 2017-04-11

## 2017-04-11 RX ORDER — SODIUM CHLORIDE 0.9 % (FLUSH) 0.9 %
10 SYRINGE (ML) INJECTION AS NEEDED
Status: CANCELLED | OUTPATIENT
Start: 2017-04-11

## 2017-04-11 RX ORDER — SODIUM CHLORIDE 0.9 % (FLUSH) 0.9 %
10 SYRINGE (ML) INJECTION AS NEEDED
Status: DISCONTINUED | OUTPATIENT
Start: 2017-04-11 | End: 2017-04-11 | Stop reason: HOSPADM

## 2017-04-11 RX ORDER — PALONOSETRON 0.05 MG/ML
0.25 INJECTION, SOLUTION INTRAVENOUS ONCE
Status: COMPLETED | OUTPATIENT
Start: 2017-04-11 | End: 2017-04-11

## 2017-04-11 RX ORDER — SODIUM CHLORIDE 9 MG/ML
250 INJECTION, SOLUTION INTRAVENOUS ONCE
Status: COMPLETED | OUTPATIENT
Start: 2017-04-11 | End: 2017-04-11

## 2017-04-11 RX ADMIN — DOXORUBICIN HYDROCHLORIDE 36 MG: 2 INJECTION, SOLUTION INTRAVENOUS at 14:02

## 2017-04-11 RX ADMIN — PALONOSETRON HYDROCHLORIDE 0.25 MG: 0.25 INJECTION INTRAVENOUS at 13:46

## 2017-04-11 RX ADMIN — DEXAMETHASONE SODIUM PHOSPHATE 12 MG: 4 INJECTION, SOLUTION INTRAMUSCULAR; INTRAVENOUS at 13:46

## 2017-04-11 RX ADMIN — SODIUM CHLORIDE 250 ML: 9 INJECTION, SOLUTION INTRAVENOUS at 13:46

## 2017-04-11 RX ADMIN — HEPARIN 500 UNITS: 100 SYRINGE at 14:11

## 2017-04-11 RX ADMIN — Medication 10 ML: at 14:11

## 2017-04-18 ENCOUNTER — INFUSION (OUTPATIENT)
Dept: ONCOLOGY | Facility: HOSPITAL | Age: 77
End: 2017-04-18

## 2017-04-18 ENCOUNTER — OFFICE VISIT (OUTPATIENT)
Dept: ONCOLOGY | Facility: CLINIC | Age: 77
End: 2017-04-18

## 2017-04-18 VITALS
WEIGHT: 175 LBS | TEMPERATURE: 98 F | HEART RATE: 104 BPM | DIASTOLIC BLOOD PRESSURE: 69 MMHG | RESPIRATION RATE: 18 BRPM | SYSTOLIC BLOOD PRESSURE: 140 MMHG | BODY MASS INDEX: 29.88 KG/M2 | HEIGHT: 64 IN

## 2017-04-18 DIAGNOSIS — R91.8 LUNG NODULES: Primary | ICD-10-CM

## 2017-04-18 DIAGNOSIS — C50.111 MALIGNANT NEOPLASM OF CENTRAL PORTION OF RIGHT FEMALE BREAST (HCC): Primary | ICD-10-CM

## 2017-04-18 DIAGNOSIS — C50.111 MALIGNANT NEOPLASM OF CENTRAL PORTION OF RIGHT FEMALE BREAST (HCC): ICD-10-CM

## 2017-04-18 LAB
ERYTHROCYTE [DISTWIDTH] IN BLOOD BY AUTOMATED COUNT: 24.6 % (ref 11.3–14.5)
HCT VFR BLD AUTO: 35.3 % (ref 34.5–44)
HGB BLD-MCNC: 11 G/DL (ref 11.5–15.5)
LYMPHOCYTES # BLD AUTO: 1.4 10*3/MM3 (ref 0.6–4.8)
LYMPHOCYTES NFR BLD AUTO: 48.7 % (ref 24–44)
MCH RBC QN AUTO: 28.7 PG (ref 27–31)
MCHC RBC AUTO-ENTMCNC: 31.2 G/DL (ref 32–36)
MCV RBC AUTO: 91.8 FL (ref 80–99)
MONOCYTES # BLD AUTO: 0.2 10*3/MM3 (ref 0–1)
MONOCYTES NFR BLD AUTO: 8.2 % (ref 0–12)
NEUTROPHILS # BLD AUTO: 1.2 10*3/MM3 (ref 1.5–8.3)
NEUTROPHILS NFR BLD AUTO: 43.1 % (ref 41–71)
PLATELET # BLD AUTO: 230 10*3/MM3 (ref 150–450)
PMV BLD AUTO: 7.1 FL (ref 6–12)
RBC # BLD AUTO: 3.84 10*6/MM3 (ref 3.89–5.14)
WBC NRBC COR # BLD: 2.8 10*3/MM3 (ref 3.5–10.8)

## 2017-04-18 PROCEDURE — 96375 TX/PRO/DX INJ NEW DRUG ADDON: CPT

## 2017-04-18 PROCEDURE — 25010000002 DOXORUBICIN PER 10 MG: Performed by: INTERNAL MEDICINE

## 2017-04-18 PROCEDURE — 96409 CHEMO IV PUSH SNGL DRUG: CPT

## 2017-04-18 PROCEDURE — 96367 TX/PROPH/DG ADDL SEQ IV INF: CPT

## 2017-04-18 PROCEDURE — 99215 OFFICE O/P EST HI 40 MIN: CPT | Performed by: INTERNAL MEDICINE

## 2017-04-18 PROCEDURE — 25010000002 PALONOSETRON PER 25 MCG: Performed by: INTERNAL MEDICINE

## 2017-04-18 PROCEDURE — 85025 COMPLETE CBC W/AUTO DIFF WBC: CPT

## 2017-04-18 PROCEDURE — 25010000002 HEPARIN FLUSH (PORCINE) 100 UNIT/ML SOLUTION: Performed by: INTERNAL MEDICINE

## 2017-04-18 PROCEDURE — 25010000002 DEXAMETHASONE PER 1 MG: Performed by: INTERNAL MEDICINE

## 2017-04-18 RX ORDER — SODIUM CHLORIDE 0.9 % (FLUSH) 0.9 %
10 SYRINGE (ML) INJECTION AS NEEDED
Status: CANCELLED | OUTPATIENT
Start: 2017-04-18

## 2017-04-18 RX ORDER — PALONOSETRON 0.05 MG/ML
0.25 INJECTION, SOLUTION INTRAVENOUS ONCE
Status: COMPLETED | OUTPATIENT
Start: 2017-04-18 | End: 2017-04-18

## 2017-04-18 RX ORDER — SODIUM CHLORIDE 9 MG/ML
250 INJECTION, SOLUTION INTRAVENOUS ONCE
Status: COMPLETED | OUTPATIENT
Start: 2017-04-18 | End: 2017-04-18

## 2017-04-18 RX ORDER — SODIUM CHLORIDE 0.9 % (FLUSH) 0.9 %
10 SYRINGE (ML) INJECTION AS NEEDED
Status: DISCONTINUED | OUTPATIENT
Start: 2017-04-18 | End: 2017-04-18 | Stop reason: HOSPADM

## 2017-04-18 RX ORDER — DOXORUBICIN HYDROCHLORIDE 2 MG/ML
20 INJECTION, SOLUTION INTRAVENOUS ONCE
Status: COMPLETED | OUTPATIENT
Start: 2017-04-18 | End: 2017-04-18

## 2017-04-18 RX ADMIN — Medication 10 ML: at 13:27

## 2017-04-18 RX ADMIN — SODIUM CHLORIDE 250 ML: 9 INJECTION, SOLUTION INTRAVENOUS at 12:57

## 2017-04-18 RX ADMIN — DOXORUBICIN HYDROCHLORIDE 36 MG: 2 INJECTION, SOLUTION INTRAVENOUS at 13:19

## 2017-04-18 RX ADMIN — HEPARIN 500 UNITS: 100 SYRINGE at 13:27

## 2017-04-18 RX ADMIN — DEXAMETHASONE SODIUM PHOSPHATE 12 MG: 4 INJECTION, SOLUTION INTRAMUSCULAR; INTRAVENOUS at 13:00

## 2017-04-18 RX ADMIN — PALONOSETRON HYDROCHLORIDE 0.25 MG: 0.25 INJECTION INTRAVENOUS at 12:58

## 2017-04-18 NOTE — PROGRESS NOTES
DATE OF VISIT: 4/18/2017    REASON FOR VISIT: Followup for   1. Breast cancer:   a) Initially presenting as stage IIB disease, T2N1M0, estrogen receptor  strongly positive, progesterone receptor intermediate, HER-2/jean marie negative.   b) Status post right mastectomy followed by adjuvant chemotherapy using  Taxotere and Cytoxan 4 cycles.   c) Took Arimidex 1 mg p.o. daily from 01/10/2013 until 04/28/2015.   2. Locally relapsed disease with right chest wall as well as axillary lymph  nodes biopsy proven metastatic disease done 04/17/2015.  3. Enrolled on clinical trial [employer]Co[employer]  randomizing patients for 2nd line  hormone treatment with Aromasin with placebo versus Aromasin with Entinostat.   4. Currently on Aromasin single agent.   5. Completed adjuvant radiation treatment by Dr. Benavides to the right chest  wall as well as right axillary lymph nodes 12/09/2015.   6. Currently metastatic disease with new liver metastases documented on CAT  scan 04/22/2016.   7. Started Faslodex Ibrance 05/02/2016.   8. Switched to Carbo/gemzar weekly secondary to progressive disease from 07/22/2016 till 10/11/2016.  9. Started gemzar single agent weekly 2 weeks on and 1 week off 10/25/2016.  10.  Started weekly Adriamycin single agent on February 14, 2017     HISTORY OF PRESENT ILLNESS: The patient is a very pleasant 75-year-old female  with past medical history significant for invasive ductal carcinoma of the  right breast diagnosed 06/21/2012. The patient is status post right mastectomy  on 07/13/2012 with lymph node dissection, tumor greatest dimension was 2.5 cm,  2 out of 8 positive axial lymph nodes, clear surgical margins. The patient was  started on adjuvant chemotherapy using Taxotere and Cytoxan on 10/08/2012. The  patient completed her treatment on 12/10/2012. The patient was started on  adjuvant hormone treatment using Arimidex 1 mg p.o. daily 01/10/2013. The  patient presented with right chest wall mass. Biopsy done on  04/17/2015  revealed relapsed high-grade ductal carcinoma. She had CAT scan of the chest,  abdomen, and pelvis that revealed disease in the right chest wall as well as  right axilla. The patient was enrolled with ECoG  Aromasin with or without  Entinostat on 05/08/2015. The patient had progressive disease documented on  scans done 04/22/2016 with liver metastases. The patient was started on  Faslodex Ibrance on 05/02/2016. Repeat CT scan done 7/22/2016 showed continued progression of disease, and the patient's treatment was changed to carbo/gemzar 2 weeks on, 1 week off. Completed 5 cycles on 10/11/2016. shw started Gemzar single agent 10/25/2016.  Repeated CAT scan done February 3, 2017 showed progressive liver metastases.  Patient was switched to weekly Adriamycin started on February 14, 2017.  The patient is here today for cycle #3, day 8.      SUBJECTIVE: The patient has been doing fairly well.  Her upper airway symptoms had resolved. She has had no fever or chills. She is otherwise doing fairly well with no significant side effects from treatment. She is eating and drinking without difficulty. She has no headaches or blurry vision.     PAST MEDICAL HISTORY/SOCIAL HISTORY/FAMILY HISTORY: Unchanged from my prior documentation done on 10/2/2012.     Review of Systems   Constitutional: Positive for fatigue. Negative for activity change, chills, fever and unexpected weight change.   HENT: Positive for rhinorrhea and sneezing. Negative for hearing loss, mouth sores, nosebleeds, sore throat and trouble swallowing.    Eyes: Negative for visual disturbance.   Respiratory: Positive for cough. Negative for chest tightness, shortness of breath and wheezing.    Cardiovascular: Negative for chest pain, palpitations and leg swelling.   Gastrointestinal: Negative for abdominal distention, abdominal pain, blood in stool, constipation, diarrhea, nausea, rectal pain and vomiting.   Endocrine: Negative for cold intolerance  and heat intolerance.   Genitourinary: Negative for difficulty urinating, dysuria, frequency and urgency.   Musculoskeletal: Positive for arthralgias. Negative for back pain, gait problem and myalgias.   Skin: Negative for rash.   Neurological: Negative for dizziness, tremors, syncope, weakness, light-headedness, numbness and headaches.   Hematological: Negative for adenopathy. Does not bruise/bleed easily.   Psychiatric/Behavioral: Negative for confusion, sleep disturbance and suicidal ideas. The patient is not nervous/anxious.          Current Outpatient Prescriptions:   •  albuterol (VENTOLIN HFA) 108 (90 BASE) MCG/ACT inhaler, Inhale 2 puffs Every 6 (Six) Hours As Needed for Wheezing or Shortness of Air., Disp: 18 g, Rfl: 5  •  amLODIPine (NORVASC) 10 MG tablet, Take 10 mg by mouth daily., Disp: , Rfl:   •  azithromycin (ZITHROMAX) 250 MG tablet, take 2 tablets by mouth on day 1 then 1 tablet on days 2 through 5, Disp: , Rfl: 0  •  CETIRIZINE HCL PO, Take 1 tablet by mouth Daily., Disp: , Rfl:   •  chlorhexidine (PERIDEX) 0.12 % solution, use as directed, Disp: , Rfl: 0  •  clindamycin (CLEOCIN) 150 MG capsule, take 1 capsule by mouth every 6 hours until finished, Disp: , Rfl: 0  •  hydrALAZINE (APRESOLINE) 25 MG tablet, Take 25 mg by mouth 3 (three) times a day., Disp: , Rfl:   •  HYDROcodone-acetaminophen (NORCO) 5-325 MG per tablet, Take 1 tablet by mouth Every 8 (Eight) Hours As Needed for Moderate Pain (4-6)., Disp: 90 tablet, Rfl: 0  •  levoFLOXacin (LEVAQUIN) 500 MG tablet, Take 1 tablet by mouth Daily for 7 days., Disp: 7 tablet, Rfl: 0  •  lidocaine-prilocaine (EMLA) 2.5-2.5 % cream, Apply  topically As Needed for mild pain (1-3) (prior to port access)., Disp: 30 g, Rfl: 2  •  Multiple Vitamin (TAB-A-TESS) tablet, Take 1 tablet by mouth daily., Disp: , Rfl: 0  •  quinapril (ACCUPRIL) 10 MG tablet, Take 10 mg by mouth every night., Disp: , Rfl:     PHYSICAL EXAMINATION:   /69  Pulse 104  Temp 98  "°F (36.7 °C)  Resp 18  Ht 63.5\" (161.3 cm)  Wt 175 lb (79.4 kg)  BMI 30.51 kg/m2   ECOG Performance Status: 1  General Appearance:  alert, cooperative, no apparent distress and appears stated age   Neurologic/Psychiatric: A&O x 3, gait steady, appropriate affect, strength 5/5 in all muscle groups   HEENT:  Normocephalic, without obvious abnormality, mucous membranes moist   Neck: Supple, symmetrical, trachea midline, no adenopathy;  No thyromegaly, masses, or tenderness   Lungs:   Clear to auscultation bilaterally; respirations regular, even, and unlabored bilaterally   Heart:  Regular rate and rhythm, no murmurs appreciated   Abdomen:   Soft, non-tender, non-distended and no organomegaly   Lymph nodes: No cervical, supraclavicular, inguinal or axillary adenopathy noted   Extremities: Normal, atraumatic; no clubbing, cyanosis, or edema    Skin: No rashes, ulcers, or suspicious lesions noted     Infusion on 04/11/2017   Component Date Value Ref Range Status   • Glucose 04/11/2017 123* 70 - 100 mg/dL Final   • BUN 04/11/2017 12  9 - 23 mg/dL Final   • Creatinine 04/11/2017 0.70  0.60 - 1.30 mg/dL Final   • Sodium 04/11/2017 139  132 - 146 mmol/L Final   • Potassium 04/11/2017 4.3  3.5 - 5.5 mmol/L Final   • Chloride 04/11/2017 108  99 - 109 mmol/L Final   • CO2 04/11/2017 32.0* 20.0 - 31.0 mmol/L Final   • Calcium 04/11/2017 8.8  8.7 - 10.4 mg/dL Final   • Total Protein 04/11/2017 5.9  5.7 - 8.2 g/dL Final   • Albumin 04/11/2017 3.30  3.20 - 4.80 g/dL Final   • ALT (SGPT) 04/11/2017 25  7 - 40 U/L Final   • AST (SGOT) 04/11/2017 54* 0 - 33 U/L Final   • Alkaline Phosphatase 04/11/2017 117* 25 - 100 U/L Final   • Total Bilirubin 04/11/2017 0.2* 0.3 - 1.2 mg/dL Final   • eGFR   Amer 04/11/2017 98  >60 mL/min/1.73 Final   • Globulin 04/11/2017 2.6  gm/dL Final   • A/G Ratio 04/11/2017 1.3* 1.5 - 2.5 g/dL Final   • BUN/Creatinine Ratio 04/11/2017 17.1  7.0 - 25.0 Final   • Anion Gap 04/11/2017 -1.0* 3.0 - 11.0 " mmol/L Final   • WBC 04/11/2017 2.70* 3.50 - 10.80 10*3/mm3 Final   • RBC 04/11/2017 3.64* 3.89 - 5.14 10*6/mm3 Final   • Hemoglobin 04/11/2017 10.3* 11.5 - 15.5 g/dL Final   • Hematocrit 04/11/2017 32.9* 34.5 - 44.0 % Final   • RDW 04/11/2017 24.6* 11.3 - 14.5 % Final   • MCV 04/11/2017 90.5  80.0 - 99.0 fL Final   • MCH 04/11/2017 28.2  27.0 - 31.0 pg Final   • MCHC 04/11/2017 31.2* 32.0 - 36.0 g/dL Final   • MPV 04/11/2017 7.1  6.0 - 12.0 fL Final   • Platelets 04/11/2017 275  150 - 450 10*3/mm3 Final   • Neutrophil % 04/11/2017 42.4  41.0 - 71.0 % Final   • Lymphocyte % 04/11/2017 49.7* 24.0 - 44.0 % Final   • Monocyte % 04/11/2017 7.9  0.0 - 12.0 % Final   • Neutrophils, Absolute 04/11/2017 1.10* 1.50 - 8.30 10*3/mm3 Final   • Lymphocytes, Absolute 04/11/2017 1.30  0.60 - 4.80 10*3/mm3 Final   • Monocytes, Absolute 04/11/2017 0.20  0.00 - 1.00 10*3/mm3 Final        Ct Chest With Contrast    Result Date: 3/28/2017  Narrative: EXAMINATION: CT CHEST W CONTRAST-  INDICATION: C50.111-Malignant neoplasm of central portion of right female breast; followup scan.  TECHNIQUE: CT scan of the chest was performed following intravenous contrast.  The radiation dose reduction device was turned on for each scan per the ALARA (As Low as Reasonably Achievable) protocol.  COMPARISON: 02/02/2017.  FINDINGS: There is mild left axillary lymphadenopathy. There is a left axillary node which has a maximum measurement of approximately 1.7 x 3.7 cm and has a curvilinear configuration.  There is a smaller nodule measuring slightly less than 1 cm in the left axilla. These findings are stable.  On the right, there is a fluid collection anterior to the surgical clips in the axillary region measuring approximately 1.3 x 1.7 cm and probably representing a seroma. This is unchanged since the previous examination. There is mild middle mediastinal lymphadenopathy. The precarinal and subcarinal nodes are smaller than on the previous examination.  There is no hilar adenopathy.  There is no pericardial or pleural effusion. Images displayed at lung window settings reveal no acute inflammatory process. There is a mass in the medial aspect of the left upper lobe which is slightly larger than on the previous exam.  There is no other pulmonary nodule.      Impression: 1. There is left axillary lymphadenopathy, stable. 2. Right axillary fluid collection probably representing a seroma, stable 3. Middle mediastinal adenopathy primarily in the precarinal and subcarinal spaces has improved. 4. The only sign of progression of disease is an area of increased nodularity in the left upper lobe medially abutting the cardiac silhouette.  There is a nodule in this region which is slightly larger than on the previous exam.  D:  03/28/2017 E:  03/28/2017   This report was finalized on 3/28/2017 2:17 PM by Dr. Aaron Meadows MD.      Ct Abdomen Pelvis With Contrast    Result Date: 3/28/2017  Narrative: EXAMINATION: CT ABDOMEN PELVIS W CONTRAST- 03/28/2017  INDICATION: f/u scan; C50.111-Malignant neoplasm of central portion of right female breast     TECHNIQUE: CT scan of the abdomen and pelvis was performed following intravenous contrast.  The radiation dose reduction device was turned on for each scan per the ALARA (As Low as Reasonably Achievable) protocol.  COMPARISON: 02/02/2017  FINDINGS:  There is a low-density in the superolateral aspect of the right lobe of the liver.  There is a larger abnormality in the more central portion of the right lobe.  There is a third lesion in the posterior inferior aspect of the right lobe.  The spleen is normal. There is no adrenal or pancreatic mass. There is no renal mass, stone or obstruction. There is no ascites, aneurysm or retroperitoneal lymphadenopathy. There is no pelvic mass or fluid. There is no inguinal lymphadenopathy.      Impression: Widespread hepatic metastatic disease, unchanged since 02/02/2017.   D:  03/28/2017 E:   03/28/2017  This report was finalized on 3/28/2017 2:59 PM by Dr. Aaron Meadows MD.        ASSESSMENT: The patient is a very pleasant 75-year-old female with right breast  cancer.     PROBLEM LIST:  1. T2N1M0 invasive ductal carcinoma of the right breast, tumor greatest  dimension 2.5 cm, estrogen receptor strongly positive, progesterone receptor  intermediate, HER-2/jean marie negative by IHC score of 0, 2 out of 8 positive  axillary lymph nodes.   2. Status post right mastectomy with axillary lymph node dissection done by  Dr. Heart on 07/30/2012.   3. Status post 4 cycles of adjuvant chemotherapy using Taxotere and Cytoxan  from 10/08/2012 until 12/10/2012.   4. Took Arimidex 1 mg p.o. daily from 01/10/2013 until 04/28/2015.   5. Relapsed disease documented on CAT scan of the chest, abdomen, and pelvis  done 04/27/2015. Revealed right chest wall mass as well as right axillary  lymphadenopathy. Biopsy done 04/17/2015 revealed invasive ductal carcinoma.   6. Enrolled on ECoG  protocol, Aromasin with placebo versus Aromasin and  Entinostat, 05/08/2015.   7. Stopped Aromasin 04/25/2016 secondary to new liver metastases documented on  CAT scan 04/22/2016.   8. Completed adjuvant radiation treatment to the right chest wall 12/09/2015.  9. Status post right axillary dissection done by Dr. Heart 03/14/2016.   10. Progressive disease documented on CAT scan done 04/22/2016 with new liver  metastases.   11. Started Faslodex Ibrance 05/02/2016.  12. Neutropenia secondary Ibrance. The patient is on 100 mg, 3 weeks on and 1  week off.   13. Rheumatoid arthritis.   14. Worsening metastatic disease documented on CT scans done on 07/25/2016.  15. Started on carboplatin AUC 2 with gemzar given two weeks on, one week off. She is status post three cycle of treatment.   16. Mixed response to treatment documented on CAT scans done 11/15/2016.  17. On gemzar single agent, status post 6 cycles.  18.  Progressive disease documented on CAT  scan done February 4, 2017.  19.  Started weekly Adriamycin February 14, 2017.     PLAN:  1. I reviewed the CAT scan results with the patient. I reviewed the films myself and with the patient. I told her that her disease is essentially stable.   2. We will proceed with treatment as scheduled today.  3. The patient will follow-up with me in 2 weeks to evaluate for treatment tolerance.  4. She will continue use of EMLA cream prior to port access.  5. The patient will continue to hold her methotrexate as she is on active cancer treatment.  5. We will continue to monitor the patient's blood counts, kidney function, and liver function throughout treatment.   6. We will continue Norco 5/325 mg 1 tablet taken every 6 hours as needed for cancer-related pain. She has not been needing this medication recently, but has it to be used if needed.   7.  We will repeat CAT scans in 2 months. This will be due 05/28/2017.    8.  We reviewed the side effects of this regimen including nausea, fatigue, myelosuppression, infusion reaction, cardiotoxicity, myelodysplasia, alopecia, and constipation or diarrhea.  9. The patient will complete her current course of azithromycin, prescribed for upper respiratory infection. I told her to call if she does not continue to improve, or if she develops fever, chills, or worsening cough.     France Chun M.D.  4/18/2017

## 2017-04-25 ENCOUNTER — INFUSION (OUTPATIENT)
Dept: ONCOLOGY | Facility: HOSPITAL | Age: 77
End: 2017-04-25

## 2017-04-25 VITALS
BODY MASS INDEX: 29.71 KG/M2 | HEIGHT: 64 IN | TEMPERATURE: 98.2 F | DIASTOLIC BLOOD PRESSURE: 62 MMHG | WEIGHT: 174 LBS | RESPIRATION RATE: 18 BRPM | SYSTOLIC BLOOD PRESSURE: 115 MMHG | HEART RATE: 105 BPM

## 2017-04-25 DIAGNOSIS — C50.111 MALIGNANT NEOPLASM OF CENTRAL PORTION OF RIGHT FEMALE BREAST (HCC): Primary | ICD-10-CM

## 2017-04-25 DIAGNOSIS — R91.8 LUNG NODULES: ICD-10-CM

## 2017-04-25 LAB
ERYTHROCYTE [DISTWIDTH] IN BLOOD BY AUTOMATED COUNT: 24.9 % (ref 11.3–14.5)
HCT VFR BLD AUTO: 34.1 % (ref 34.5–44)
HGB BLD-MCNC: 10.7 G/DL (ref 11.5–15.5)
LYMPHOCYTES # BLD AUTO: 1.4 10*3/MM3 (ref 0.6–4.8)
LYMPHOCYTES NFR BLD AUTO: 51.3 % (ref 24–44)
MCH RBC QN AUTO: 29.1 PG (ref 27–31)
MCHC RBC AUTO-ENTMCNC: 31.4 G/DL (ref 32–36)
MCV RBC AUTO: 92.4 FL (ref 80–99)
MONOCYTES # BLD AUTO: 0.2 10*3/MM3 (ref 0–1)
MONOCYTES NFR BLD AUTO: 7.4 % (ref 0–12)
NEUTROPHILS # BLD AUTO: 1.1 10*3/MM3 (ref 1.5–8.3)
NEUTROPHILS NFR BLD AUTO: 41.3 % (ref 41–71)
PLATELET # BLD AUTO: 229 10*3/MM3 (ref 150–450)
PMV BLD AUTO: 7 FL (ref 6–12)
RBC # BLD AUTO: 3.69 10*6/MM3 (ref 3.89–5.14)
WBC NRBC COR # BLD: 2.7 10*3/MM3 (ref 3.5–10.8)

## 2017-04-25 PROCEDURE — 96409 CHEMO IV PUSH SNGL DRUG: CPT

## 2017-04-25 PROCEDURE — 25010000002 HEPARIN FLUSH (PORCINE) 100 UNIT/ML SOLUTION: Performed by: INTERNAL MEDICINE

## 2017-04-25 PROCEDURE — 25010000002 DEXAMETHASONE PER 1 MG: Performed by: INTERNAL MEDICINE

## 2017-04-25 PROCEDURE — 25010000002 DOXORUBICIN PER 10 MG: Performed by: INTERNAL MEDICINE

## 2017-04-25 PROCEDURE — 96375 TX/PRO/DX INJ NEW DRUG ADDON: CPT

## 2017-04-25 PROCEDURE — 25010000002 PALONOSETRON PER 25 MCG: Performed by: INTERNAL MEDICINE

## 2017-04-25 RX ORDER — SODIUM CHLORIDE 0.9 % (FLUSH) 0.9 %
10 SYRINGE (ML) INJECTION AS NEEDED
Status: CANCELLED | OUTPATIENT
Start: 2017-04-25

## 2017-04-25 RX ORDER — SODIUM CHLORIDE 0.9 % (FLUSH) 0.9 %
10 SYRINGE (ML) INJECTION AS NEEDED
Status: DISCONTINUED | OUTPATIENT
Start: 2017-04-25 | End: 2017-04-25 | Stop reason: HOSPADM

## 2017-04-25 RX ORDER — PALONOSETRON 0.05 MG/ML
0.25 INJECTION, SOLUTION INTRAVENOUS ONCE
Status: COMPLETED | OUTPATIENT
Start: 2017-04-25 | End: 2017-04-25

## 2017-04-25 RX ORDER — DOXORUBICIN HYDROCHLORIDE 2 MG/ML
20 INJECTION, SOLUTION INTRAVENOUS ONCE
Status: COMPLETED | OUTPATIENT
Start: 2017-04-25 | End: 2017-04-25

## 2017-04-25 RX ORDER — SODIUM CHLORIDE 9 MG/ML
250 INJECTION, SOLUTION INTRAVENOUS ONCE
Status: COMPLETED | OUTPATIENT
Start: 2017-04-25 | End: 2017-04-25

## 2017-04-25 RX ADMIN — SODIUM CHLORIDE 250 ML: 9 INJECTION, SOLUTION INTRAVENOUS at 13:28

## 2017-04-25 RX ADMIN — HEPARIN 500 UNITS: 100 SYRINGE at 14:24

## 2017-04-25 RX ADMIN — Medication 10 ML: at 14:24

## 2017-04-25 RX ADMIN — DEXAMETHASONE SODIUM PHOSPHATE 12 MG: 4 INJECTION, SOLUTION INTRAMUSCULAR; INTRAVENOUS at 13:39

## 2017-04-25 RX ADMIN — PALONOSETRON HYDROCHLORIDE 0.25 MG: 0.25 INJECTION INTRAVENOUS at 13:31

## 2017-04-25 RX ADMIN — DOXORUBICIN HYDROCHLORIDE 36 MG: 2 INJECTION, SOLUTION INTRAVENOUS at 13:59

## 2017-05-02 ENCOUNTER — INFUSION (OUTPATIENT)
Dept: ONCOLOGY | Facility: HOSPITAL | Age: 77
End: 2017-05-02

## 2017-05-02 ENCOUNTER — OFFICE VISIT (OUTPATIENT)
Dept: ONCOLOGY | Facility: CLINIC | Age: 77
End: 2017-05-02

## 2017-05-02 VITALS
BODY MASS INDEX: 29.88 KG/M2 | HEIGHT: 64 IN | HEART RATE: 85 BPM | WEIGHT: 175 LBS | DIASTOLIC BLOOD PRESSURE: 65 MMHG | SYSTOLIC BLOOD PRESSURE: 131 MMHG | TEMPERATURE: 97.4 F | RESPIRATION RATE: 20 BRPM

## 2017-05-02 DIAGNOSIS — C50.111 MALIGNANT NEOPLASM OF CENTRAL PORTION OF RIGHT FEMALE BREAST (HCC): Primary | ICD-10-CM

## 2017-05-02 DIAGNOSIS — C50.111 MALIGNANT NEOPLASM OF CENTRAL PORTION OF RIGHT FEMALE BREAST (HCC): ICD-10-CM

## 2017-05-02 DIAGNOSIS — R91.8 LUNG NODULES: Primary | ICD-10-CM

## 2017-05-02 LAB
ERYTHROCYTE [DISTWIDTH] IN BLOOD BY AUTOMATED COUNT: 25.4 % (ref 11.3–14.5)
HCT VFR BLD AUTO: 32.3 % (ref 34.5–44)
HGB BLD-MCNC: 9.9 G/DL (ref 11.5–15.5)
LYMPHOCYTES # BLD AUTO: 1.2 10*3/MM3 (ref 0.6–4.8)
LYMPHOCYTES NFR BLD AUTO: 48.7 % (ref 24–44)
MCH RBC QN AUTO: 29 PG (ref 27–31)
MCHC RBC AUTO-ENTMCNC: 30.6 G/DL (ref 32–36)
MCV RBC AUTO: 94.6 FL (ref 80–99)
MONOCYTES # BLD AUTO: 0.3 10*3/MM3 (ref 0–1)
MONOCYTES NFR BLD AUTO: 10.5 % (ref 0–12)
NEUTROPHILS # BLD AUTO: 1 10*3/MM3 (ref 1.5–8.3)
NEUTROPHILS NFR BLD AUTO: 40.8 % (ref 41–71)
PLATELET # BLD AUTO: 218 10*3/MM3 (ref 150–450)
PMV BLD AUTO: 7.4 FL (ref 6–12)
RBC # BLD AUTO: 3.41 10*6/MM3 (ref 3.89–5.14)
WBC NRBC COR # BLD: 2.5 10*3/MM3 (ref 3.5–10.8)

## 2017-05-02 PROCEDURE — 25010000002 DEXAMETHASONE PER 1 MG: Performed by: INTERNAL MEDICINE

## 2017-05-02 PROCEDURE — 96409 CHEMO IV PUSH SNGL DRUG: CPT

## 2017-05-02 PROCEDURE — 25010000002 DOXORUBICIN PER 10 MG: Performed by: INTERNAL MEDICINE

## 2017-05-02 PROCEDURE — 25010000002 HEPARIN FLUSH (PORCINE) 100 UNIT/ML SOLUTION: Performed by: INTERNAL MEDICINE

## 2017-05-02 PROCEDURE — 99214 OFFICE O/P EST MOD 30 MIN: CPT | Performed by: INTERNAL MEDICINE

## 2017-05-02 PROCEDURE — 25010000002 PALONOSETRON PER 25 MCG: Performed by: INTERNAL MEDICINE

## 2017-05-02 PROCEDURE — 96375 TX/PRO/DX INJ NEW DRUG ADDON: CPT

## 2017-05-02 PROCEDURE — 96411 CHEMO IV PUSH ADDL DRUG: CPT

## 2017-05-02 RX ORDER — SODIUM CHLORIDE 0.9 % (FLUSH) 0.9 %
10 SYRINGE (ML) INJECTION AS NEEDED
Status: DISCONTINUED | OUTPATIENT
Start: 2017-05-02 | End: 2017-05-02 | Stop reason: HOSPADM

## 2017-05-02 RX ORDER — SODIUM CHLORIDE 9 MG/ML
250 INJECTION, SOLUTION INTRAVENOUS ONCE
Status: COMPLETED | OUTPATIENT
Start: 2017-05-02 | End: 2017-05-02

## 2017-05-02 RX ORDER — PALONOSETRON 0.05 MG/ML
0.25 INJECTION, SOLUTION INTRAVENOUS ONCE
Status: COMPLETED | OUTPATIENT
Start: 2017-05-02 | End: 2017-05-02

## 2017-05-02 RX ORDER — SODIUM CHLORIDE 0.9 % (FLUSH) 0.9 %
10 SYRINGE (ML) INJECTION AS NEEDED
Status: CANCELLED | OUTPATIENT
Start: 2017-05-02

## 2017-05-02 RX ORDER — DOXORUBICIN HYDROCHLORIDE 2 MG/ML
20 INJECTION, SOLUTION INTRAVENOUS ONCE
Status: COMPLETED | OUTPATIENT
Start: 2017-05-02 | End: 2017-05-02

## 2017-05-02 RX ADMIN — DOXORUBICIN HYDROCHLORIDE 36 MG: 2 INJECTION, SOLUTION INTRAVENOUS at 15:58

## 2017-05-02 RX ADMIN — Medication 10 ML: at 16:11

## 2017-05-02 RX ADMIN — PALONOSETRON HYDROCHLORIDE 0.25 MG: 0.25 INJECTION INTRAVENOUS at 15:39

## 2017-05-02 RX ADMIN — DEXAMETHASONE SODIUM PHOSPHATE 12 MG: 4 INJECTION, SOLUTION INTRAMUSCULAR; INTRAVENOUS at 15:41

## 2017-05-02 RX ADMIN — HEPARIN 500 UNITS: 100 SYRINGE at 16:11

## 2017-05-02 RX ADMIN — SODIUM CHLORIDE 250 ML: 9 INJECTION, SOLUTION INTRAVENOUS at 15:38

## 2017-06-09 NOTE — PROGRESS NOTES
[facility] Nutrition    Weight 171 lbs / weight stable    Follow up with patient today; progression of disease; starting Doxil maintenance every 4 weeks.    Patient states that she is doing very well except she does not have much of an appetite.  Discussed tips for management of a poor appetite; suggestions provided for types of foods that she may find more appealing and easy for her to fix.  Patient lives alone and will generally cook when her grandchildren are in and out, but otherwise does not do much cooking.  She has purchased Ensure but is not drinking; encouraged to drink at least 1 serving per day to supplement oral food intake.  Provided coupons for additional purchase of Ensure.  Will follow as needed.

## 2017-07-05 PROBLEM — R11.2 NON-INTRACTABLE VOMITING WITH NAUSEA: Status: ACTIVE | Noted: 2017-01-01

## 2017-07-05 NOTE — PLAN OF CARE
Problem: Patient Care Overview (Adult)  Goal: Plan of Care Review  Outcome: Ongoing (interventions implemented as appropriate)    07/05/17 1618   Coping/Psychosocial Response Interventions   Plan Of Care Reviewed With patient   Patient Care Overview   Progress improving       Goal: Discharge Needs Assessment  Outcome: Ongoing (interventions implemented as appropriate)    07/05/17 1618   Discharge Needs Assessment   Concerns To Be Addressed no discharge needs identified   Discharge Disposition still a patient         Problem: Nutrition, Imbalanced: Inadequate Oral Intake (Adult)  Goal: Identify Related Risk Factors and Signs and Symptoms  Outcome: Ongoing (interventions implemented as appropriate)    07/05/17 1618   Nutrition, Imbalanced: Inadequate Oral Intake   Nutrition Imbalanced: Less than Body Requirements: Related Risk Factors appetite decreased       Goal: Improved Oral Intake  Outcome: Ongoing (interventions implemented as appropriate)    07/05/17 1618   Nutrition, Imbalanced: Inadequate Oral Intake (Adult)   Improved Oral Intake making progress toward outcome       Goal: Prevent Further Weight Loss  Outcome: Ongoing (interventions implemented as appropriate)    07/05/17 1618   Nutrition, Imbalanced: Inadequate Oral Intake (Adult)   Prevent Further Weight Loss making progress toward outcome         Problem: Respiratory Insufficiency (Adult)  Goal: Identify Related Risk Factors and Signs and Symptoms  Outcome: Ongoing (interventions implemented as appropriate)    07/05/17 1618   Respiratory Insufficiency   Related Risk Factors (Respiratory Insufficiency) activity intolerance       Goal: Acid/Base Balance  Outcome: Ongoing (interventions implemented as appropriate)    07/05/17 1618   Respiratory Insufficiency (Adult)   Acid/Base Balance making progress toward outcome       Goal: Effective Ventilation  Outcome: Ongoing (interventions implemented as appropriate)    07/05/17 1618   Respiratory Insufficiency  (Adult)   Effective Ventilation making progress toward outcome

## 2017-07-05 NOTE — H&P
Hospital Medicine History and Physical    Primary Care Physician: Yobani Lima MD    Chief complaint:  Nausea and vomiting    Subjective     History of Present Illness    77 yr old woman currently undergoing palliative chemo for met breast CA with liver mets.  Has been on adriamycin since Feb.  Comes to ER with crampy mid- abdominal p[ain that started last night.  No fevers or chills.  Last BM was yesterday.  She sometimes has constipation.  No diarrhea.  Has vomited a few times since last night but not much comes up.  No hematemesis or blood in stool.  Her last chemo infusion was in June and her next is planned for Friday.  She thinks she may have recently been changed off adriamycin.     Currently thte crampy pain has eased and she actually feesl pretty good, though not baseline.     Has been doing okay at home, though poor appetite.  Denies CP SOA dysuria.  No other issues.        Review of Systems   Otherwise complete ROS is negative except as mentioned in the HPI.    Past Medical History:   Past Medical History:   Diagnosis Date   • Arthritis    • Breast cancer 07/2012   • Disease of thyroid gland    • Drug therapy    • Hx of radiation therapy    • Hypertension    • Liver metastasis 8/17/2016   • Osteoporosis        Past Surgical History:  Past Surgical History:   Procedure Laterality Date   • BREAST BIOPSY     • MASTECTOMY Right 07/2012   • MASTECTOMY MODIFIED RADICAL W/ AXILLARY LYMPH NODES W/ OR W/O PECTORALIS MINOR     • PORTACATH PLACEMENT         Family History: family history includes No Known Problems in her other. There is no history of Breast cancer or Ovarian cancer.    Social History:  reports that she has quit smoking. She has never used smokeless tobacco. She reports that she does not drink alcohol or use illicit drugs.    Medications:    (Not in a hospital admission)  Allergies   Allergen Reactions   • Penicillin V Potassium [Penicillin V] Hives       Objective    Physical Exam:   Vital  "Signs: /90  Pulse 98  Temp 97.7 °F (36.5 °C)  Resp 22  Ht 64\" (162.6 cm)  Wt 170 lb (77.1 kg)  SpO2 92%  BMI 29.18 kg/m2  Physical Exam  Gen:  WD/WN elderly woman in NAD in bed.  Hair wwrapped in a scarf.  Pleasant and appropriate.   Neuro: alert and oriented, clear speech, follows commands, grossly nonfocal, soft spoken.   HEENT:  NC/AT PERRL, OP benign  Neck:  Supple, no LAD  Heart RRR no murmur, rub, or gallop  Lungs CTA nonlabored no w r r.  Comfortable on RA.  Abd:  Soft, minimally tender in the periumbilical area, no rebound or guarding, pos BS  Extrem:  No c/c/e  Skin warm and dry  Psych nl mood and affect      Results Reviewed:  I have personally reviewed current lab, radiology, and data and agree.    Results from last 7 days  Lab Units 07/05/17  0500   WBC 10*3/mm3 3.46*   HEMOGLOBIN g/dL 11.6   PLATELETS 10*3/mm3 291       Results from last 7 days  Lab Units 07/05/17  0500   SODIUM mmol/L 136   POTASSIUM mmol/L 3.5   CO2 mmol/L 27.0   CREATININE mg/dL 0.70   GLUCOSE mg/dL 159*   CALCIUM mg/dL 9.8           Assessment/Plan   Assessment & Plan  Active Problems:    * No active hospital problems. *      77 yr old owman from [facility] with severe nausea and vomiting, intractable in ER.  She has progressive metastatic breast CA and is on a weekly adriamycin regimen per Dr. Chun.      Plan:  Abdominal cramps,  n/v  -- supportive care  -- suspect constipation as main etiol  -- also consider PSBO or adverse effect of chemo or from progressive CA .   -- no imaging done yet - consider XRay or CT tomorrow if fails to respond to conservative measures.     Breast Ca  -- on third or fourth line therapy with mets to liver.    -- history includes mastectomy, chemo, XRT, multiple regimens.   -- note rising alk phos and AST  -- will make dr chun aware.       I discussed the patients findings and my recommendations with:patient    For now she meets OBS status.  If her condition fails to rersolve quickly,  Would get " abdominal imaging and reevaluate.     Shiela Nunez MD 07/05/17 10:41 AM

## 2017-07-05 NOTE — CONSULTS
Subjective     CHIEF COMPLAINT: Nausea and vomiting    HISTORY OF PRESENT ILLNESS:  The patient is a 77 y.o. year old female, referred by Dr. Nunez for breast cancer.  Patient has history of stage IV breast cancer currently she is on Doxil every 4 weeks last dose given on June 7, 2017.  The patient was in her usual state of health until 1 day ago she presented with abdominal pain, diffuse, associated with nausea and vomiting, she tried using her home medicine without any improvement.  This is started after heavy meal.  She had no bowel movement over the last few days.  She never had this problem before.  Abdominal pain is 6 out of 10 severity, radiates to her back.  Denied any fever or chills no recent travel or ill contacts.  Patient presented to [facility] emergency room yesterday she was admitted to the hospitalist service for dehydration as well as further management of her symptoms.  I was consulted by Dr. Nunez to assist in the patient's care.  When I so the patient she is laying comfortable in bed.  She denies any fever or chills she is feeling significantly better.    REVIEW OF SYSTEMS:  A 14 point review of systems was performed and is negative except as noted above.    Past Medical History:   Diagnosis Date   • Arthritis    • Breast cancer 07/2012   • Disease of thyroid gland    • Drug therapy    • Hx of radiation therapy    • Hypertension    • Liver metastasis 8/17/2016   • Osteoporosis        No current facility-administered medications on file prior to encounter.      Current Outpatient Prescriptions on File Prior to Encounter   Medication Sig Dispense Refill   • albuterol (VENTOLIN HFA) 108 (90 BASE) MCG/ACT inhaler Inhale 2 puffs Every 6 (Six) Hours As Needed for Wheezing or Shortness of Air. 18 g 5   • amLODIPine (NORVASC) 10 MG tablet Take 10 mg by mouth daily.     • hydrALAZINE (APRESOLINE) 25 MG tablet Take 25 mg by mouth 3 (three) times a day.     • lidocaine-prilocaine (EMLA) 2.5-2.5  % cream Apply  topically As Needed for mild pain (1-3) (prior to port access). 30 g 2   • Multiple Vitamin (TAB-A-TESS) tablet Take 1 tablet by mouth daily.  0   • quinapril (ACCUPRIL) 10 MG tablet Take 10 mg by mouth every night.     • [DISCONTINUED] erythromycin base (E-MYCIN) 250 MG tablet Take 250 mg by mouth 4 (Four) Times a Day.     • [DISCONTINUED] HYDROcodone-acetaminophen (NORCO) 5-325 MG per tablet Take 1 tablet by mouth Every 8 (Eight) Hours As Needed for Moderate Pain (4-6). 90 tablet 0   • [DISCONTINUED] nystatin (MYCOSTATIN) 187549 UNIT/ML suspension Swish and swallow 5 mL 4 (Four) Times a Day. 473 mL 2   • [DISCONTINUED] promethazine-dextromethorphan (PROMETHAZINE-DM) 6.25-15 MG/5ML syrup take 5-10 milliliter EVERY NIGHT if needed  0       Allergies   Allergen Reactions   • Penicillin V Potassium [Penicillin V] Hives       Past Surgical History:   Procedure Laterality Date   • BREAST BIOPSY     • MASTECTOMY Right 2012   • MASTECTOMY MODIFIED RADICAL W/ AXILLARY LYMPH NODES W/ OR W/O PECTORALIS MINOR     • PORTACATH PLACEMENT         OB History    Para Term  AB SAB TAB Ectopic Multiple Living   5 5 5             # Outcome Date GA Lbr Jamar/2nd Weight Sex Delivery Anes PTL Lv   5 Term            4 Term            3 Term            2 Term            1 Term                   Social History     Social History   • Marital status: Single     Spouse name: N/A   • Number of children: N/A   • Years of education: N/A     Social History Main Topics   • Smoking status: Former Smoker   • Smokeless tobacco: Never Used   • Alcohol use No   • Drug use: No   • Sexual activity: Not Asked     Other Topics Concern   • None     Social History Narrative   • None       Family History   Problem Relation Age of Onset   • No Known Problems Other    • Breast cancer Neg Hx    • Ovarian cancer Neg Hx        Objective     Vitals:    17 0600 17 0630 17 0730 17 1000   BP: 144/76 149/72 152/70  157/90   Pulse: 85 79 94 98   Resp:       Temp:       SpO2: 93% 94% 92% 92%   Weight:       Height:           General: well appearing female in no acute distress  HEENT: sclera anicteric, oropharynx clear  Lymphatics: no cervical, supraclavicular, or axillary adenopathy  Cardiovascular: regular rate and rhythm, no murmurs  Lungs: clear to auscultation bilaterally  Abdomen: soft, nontender, nondistended.  No palpable organomegaly  Extremeties: no lower extremity edema  Skin: no rashes, lesions, bruising, or petechiae      Admission on 07/05/2017   Component Date Value Ref Range Status   • Glucose 07/05/2017 159* 70 - 100 mg/dL Final   • BUN 07/05/2017 13  9 - 23 mg/dL Final   • Creatinine 07/05/2017 0.70  0.60 - 1.30 mg/dL Final   • Sodium 07/05/2017 136  132 - 146 mmol/L Final   • Potassium 07/05/2017 3.5  3.5 - 5.5 mmol/L Final   • Chloride 07/05/2017 100  99 - 109 mmol/L Final   • CO2 07/05/2017 27.0  20.0 - 31.0 mmol/L Final   • Calcium 07/05/2017 9.8  8.7 - 10.4 mg/dL Final   • Total Protein 07/05/2017 6.9  5.7 - 8.2 g/dL Final   • Albumin 07/05/2017 3.70  3.20 - 4.80 g/dL Final   • ALT (SGPT) 07/05/2017 47* 7 - 40 U/L Final   • AST (SGOT) 07/05/2017 120* 0 - 33 U/L Final   • Alkaline Phosphatase 07/05/2017 196* 25 - 100 U/L Final   • Total Bilirubin 07/05/2017 0.4  0.3 - 1.2 mg/dL Final   • eGFR   Amer 07/05/2017 98  >60 mL/min/1.73 Final   • Globulin 07/05/2017 3.2  gm/dL Final   • A/G Ratio 07/05/2017 1.2* 1.5 - 2.5 g/dL Final   • BUN/Creatinine Ratio 07/05/2017 18.6  7.0 - 25.0 Final   • Anion Gap 07/05/2017 9.0  3.0 - 11.0 mmol/L Final   • Lipase 07/05/2017 28  6 - 51 U/L Final   • Color, UA 07/05/2017 Yellow  Yellow, Straw Final   • Appearance, UA 07/05/2017 Clear  Clear Final   • pH, UA 07/05/2017 6.0  5.0 - 8.0 Final   • Specific Gravity, UA 07/05/2017 1.017  1.001 - 1.030 Final   • Glucose, UA 07/05/2017 Negative  Negative Final   • Ketones, UA 07/05/2017 15 mg/dL (1+)* Negative Final   •  Bilirubin, UA 07/05/2017 Negative  Negative Final   • Blood, UA 07/05/2017 Negative  Negative Final   • Protein, UA 07/05/2017 30 mg/dL (1+)* Negative Final   • Leuk Esterase, UA 07/05/2017 Small (1+)* Negative Final   • Nitrite, UA 07/05/2017 Negative  Negative Final   • Urobilinogen, UA 07/05/2017 1.0 E.U./dL  0.2 - 1.0 E.U./dL Final   • Extra Tube 07/05/2017 hold for add-on   Final    Auto resulted   • Extra Tube 07/05/2017 Hold for add-ons.   Final    Auto resulted.   • Extra Tube 07/05/2017 hold for add-on   Final    Auto resulted   • Extra Tube 07/05/2017 Hold for add-ons.   Final    Auto resulted.   • WBC 07/05/2017 3.46* 3.50 - 10.80 10*3/mm3 Final   • RBC 07/05/2017 3.71* 3.89 - 5.14 10*6/mm3 Final   • Hemoglobin 07/05/2017 11.6  11.5 - 15.5 g/dL Final   • Hematocrit 07/05/2017 36.8  34.5 - 44.0 % Final   • MCV 07/05/2017 99.2* 80.0 - 99.0 fL Final   • MCH 07/05/2017 31.3* 27.0 - 31.0 pg Final   • MCHC 07/05/2017 31.5* 32.0 - 36.0 g/dL Final   • RDW 07/05/2017 14.8* 11.3 - 14.5 % Final   • RDW-SD 07/05/2017 53.9  37.0 - 54.0 fl Final   • MPV 07/05/2017 10.4  6.0 - 12.0 fL Final   • Platelets 07/05/2017 291  150 - 450 10*3/mm3 Final   • Neutrophil % 07/05/2017 68.5  41.0 - 71.0 % Final   • Lymphocyte % 07/05/2017 17.6* 24.0 - 44.0 % Final   • Monocyte % 07/05/2017 11.8  0.0 - 12.0 % Final   • Eosinophil % 07/05/2017 0.9  0.0 - 3.0 % Final   • Basophil % 07/05/2017 0.9  0.0 - 1.0 % Final   • Immature Grans % 07/05/2017 0.3  0.0 - 0.6 % Final   • Neutrophils, Absolute 07/05/2017 2.37  1.50 - 8.30 10*3/mm3 Final   • Lymphocytes, Absolute 07/05/2017 0.61  0.60 - 4.80 10*3/mm3 Final   • Monocytes, Absolute 07/05/2017 0.41  0.00 - 1.00 10*3/mm3 Final   • Eosinophils, Absolute 07/05/2017 0.03  0.00 - 0.30 10*3/mm3 Final   • Basophils, Absolute 07/05/2017 0.03  0.00 - 0.20 10*3/mm3 Final   • Immature Grans, Absolute 07/05/2017 0.01  0.00 - 0.03 10*3/mm3 Final   • Troponin I 07/05/2017 0.03  0.00 - 0.07 ng/mL Final     Serial Number: [id number]    : [id number]   • RBC, UA 07/05/2017 0-2  None Seen, 0-2 /HPF Final   • WBC, UA 07/05/2017 0-2* None Seen /HPF Final   • Bacteria, UA 07/05/2017 None Seen  None Seen, Trace /HPF Final   • Squamous Epithelial Cells, UA 07/05/2017 0-2  None Seen, 0-2 /HPF Final   • Hyaline Casts, UA 07/05/2017 0-6  0 - 6 /LPF Final   • Methodology 07/05/2017 Automated Microscopy   Final   • Lactate 07/05/2017 1.1  0.5 - 2.0 mmol/L Final    Falsely depressed results may occur on samples drawn from patients receiving N-Acetylcysteine (NAC) or Metamizole.   • Troponin I 07/05/2017 0.03  0.00 - 0.07 ng/mL Final    Serial Number: [id number]    : [id number]        Xr Chest 1 View    Result Date: 7/5/2017  Narrative: EXAM:   XR Chest, 1 View CLINICAL HISTORY:   77 years old, female; Pain; Other: Upper abdominal pain; Prior surgery; Surgery date: 6+ months; Surgery type: Right mastectomy and port placement; Additional info: Upper abdominal pain triage protocol TECHNIQUE:   Frontal view of the chest. COMPARISON:   CT CHEST W CONTRAST 5/26/2017 2:42:47 PM FINDINGS: Left midthoracic port with attached left subclavian central venous line, tip overlying the lower superior vena cava. Heart size within normal limits. No airspace consolidation identified. No visible pneumothorax or pleural effusion. Mild coarse interstitial opacities throughout both lungs, consistent with chronic scarring and/or pulmonary vascular congestion, increased from prior study.     Impression: 1. Mild coarse interstitial opacities throughout both lungs, consistent with chronic scarring and/or pulmonary vascular congestion, increased from prior study. THIS DOCUMENT HAS BEEN ELECTRONICALLY SIGNED BY JUNIOR LEVINE MD      ASSESSMENT 77-year-old female with metastatic breast cancer and currently with abdominal pain nausea vomiting and constipation    PROBLEM LIST   1. T2N1M0 invasive ductal carcinoma of the right breast, tumor  greatest  dimension 2.5 cm, estrogen receptor strongly positive, progesterone receptor  intermediate, HER-2/jean marie negative by IHC score of 0, 2 out of 8 positive  axillary lymph nodes.   2. Status post right mastectomy with axillary lymph node dissection done by  Dr. Heart on 07/30/2012.   3. Status post 4 cycles of adjuvant chemotherapy using Taxotere and Cytoxan  from 10/08/2012 until 12/10/2012.   4. Took Arimidex 1 mg p.o. daily from 01/10/2013 until 04/28/2015.   5. Relapsed disease documented on CAT scan of the chest, abdomen, and pelvis  done 04/27/2015. Revealed right chest wall mass as well as right axillary  lymphadenopathy. Biopsy done 04/17/2015 revealed invasive ductal carcinoma.   6. Enrolled on ECoG  protocol, Aromasin with placebo versus Aromasin and  Entinostat, 05/08/2015.   7. Stopped Aromasin 04/25/2016 secondary to new liver metastases documented on  CAT scan 04/22/2016.   8. Completed adjuvant radiation treatment to the right chest wall 12/09/2015.  9. Status post right axillary dissection done by Dr. Heart 03/14/2016.   10. Progressive disease documented on CAT scan done 04/22/2016 with new liver  metastases.   11. Started Faslodex Ibrance 05/02/2016.  12. Neutropenia secondary Ibrance. The patient is on 100 mg, 3 weeks on and 1  week off.   13. Rheumatoid arthritis.   14. Worsening metastatic disease documented on CT scans done on 07/25/2016.  15. Started on carboplatin AUC 2 with gemzar given two weeks on, one week off. She is status post three cycle of treatment.   16. Mixed response to treatment documented on CAT scans done 11/15/2016.  17. On gemzar single agent, status post 6 cycles.  18. Progressive disease documented on CAT scan done February 4, 2017.  19. Started weekly Adriamycin February 14, 2017.   20. Changed to Doxil after maximum response from weekly Adriamycin, started on 06/06/2017, status post 1 dose.   21. Chemotherapy-induce nausea and vomiting  22.  Constipation  23.   Abdominal pain  24.  Slightly elevated liver enzymes     PLAN  1.  Bowel regimen, patient is currently on Colace and MiraLAX.  I'll consider adding lactulose if needed.  2.  IV fluid with 100 ML/HR normal saline.  3.  IV antiemetics with scheduled Zofran as well as as needed Phenergan.  4.  Consider repeating imaging if her symptoms don't improve.  5.  Monitor blood work.  6.  Okay to discharge home in the morning if she is doing better and she has a bowel movement.  7.  Patient will follow up with me as scheduled for next round of chemotherapy.    France Chun MD    7/5/2017

## 2017-07-06 NOTE — PROGRESS NOTES
Discharge Planning Assessment  [facility]     Patient Name: Nava Cline  MRN: [id number]  Today's Date: 7/6/2017    Admit Date: 7/5/2017          Discharge Needs Assessment       07/06/17 1325    Living Environment    Lives With alone    Living Arrangements house    Home Accessibility no concerns    Transportation Available car;family or friend will provide    Living Environment    Provides Primary Care For no one    Able to Return to Prior Living Arrangements yes    Discharge Needs Assessment    Concerns To Be Addressed no discharge needs identified;denies needs/concerns at this time    Equipment Currently Used at Home none    Equipment Needed After Discharge none    Current Discharge Risk lives alone    Discharge Disposition home or self-care            Discharge Plan       07/06/17 1325    Case Management/Social Work Plan    Plan Home    Patient/Family In Agreement With Plan yes    Additional Comments I spoke with Mrs. Cline at the bedside. She is anticipating being discharged home today. She denies needs for home health services or DME. She states that her children can transport her home.        Discharge Placement     No information found        Expected Discharge Date and Time     Expected Discharge Date Expected Discharge Time    Jul 6, 2017               Demographic Summary       07/06/17 1324    Referral Information    Admission Type observation    Arrived From home or self-care    Referral Source admission list    Record Reviewed history and physical    Primary Care Physician Information    Name Yobani Lima            Functional Status       07/06/17 1324    Functional Status Prior    Ambulation 0-->independent    Transferring 0-->independent    Toileting 0-->independent    Bathing 0-->independent    Dressing 0-->independent    Eating 0-->independent    Communication 0-->understands/communicates without difficulty    Swallowing 0-->swallows foods/liquids without difficulty    IADL     Medications independent    Meal Preparation independent    Housekeeping independent    Laundry independent    Shopping independent    Oral Care independent    Employment/Financial    Employment/Finance Comments I confirmed that [employer] is Mrs. Cline's primary insurance.            Psychosocial     None            Abuse/Neglect     None            Legal     None            Substance Abuse     None            Patient Forms     None          Christopher Neumann

## 2017-07-06 NOTE — PROGRESS NOTES
"DATE OF VISIT: 7/6/2017    Chief Complain: Followup for breast cancer with abdominal pain     SUBJECTIVE: The patient has been doing fairly well.  She  denied any fever or  chills, no night sweats, denied any headaches.  Had abdominal pain had resolved.  She is complaining of constipation, no bowel movement since admission.    REVIEW OF SYSTEMS: All the other 9 systems are reviewed by me and negative  except what is mentioned in HPI.     PAST MEDICAL HISTORY/SOCIAL HISTORY/FAMILY HISTORY: Unchanged from my prior documentation done on July 5, 2017      Current Facility-Administered Medications:   •  amLODIPine (NORVASC) tablet 10 mg, 10 mg, Oral, Daily, Shiela Nunez MD, 10 mg at 07/06/17 0821  •  docusate sodium (COLACE) capsule 100 mg, 100 mg, Oral, BID, Shiela Nunez MD, 100 mg at 07/06/17 0821  •  enoxaparin (LOVENOX) syringe 40 mg, 40 mg, Subcutaneous, Daily, Shiela Nunez MD, 40 mg at 07/06/17 0821  •  hydrALAZINE (APRESOLINE) tablet 25 mg, 25 mg, Oral, Nightly PRN, Shiela Nunez MD  •  hydrALAZINE (APRESOLINE) tablet 50 mg, 50 mg, Oral, Daily, Shiela Nunez MD, 50 mg at 07/06/17 0820  •  lactulose (CHRONULAC) 10 GM/15ML solution 10 g, 10 g, Oral, Daily, Shiela Nunez MD, 10 g at 07/06/17 1231  •  ondansetron (ZOFRAN) injection 4 mg, 4 mg, Intravenous, Q6H PRN, Shiela Nunez MD  •  polyethylene glycol (MIRALAX) powder 17 g, 17 g, Oral, Daily, Shiela Nunez MD, 17 g at 07/06/17 0820  •  quinapril (ACCUPRIL) tablet 10 mg, 10 mg, Oral, Daily, Shiela Nunez MD, 10 mg at 07/06/17 0826  •  sodium chloride 0.9 % flush 1-10 mL, 1-10 mL, Intravenous, PRN, Shiela Nunez MD  •  sodium chloride 0.9 % flush 10 mL, 10 mL, Intravenous, PRN, Bruno Dennis DO  •  sodium chloride 0.9 % with KCl 20 mEq/L infusion, 100 mL/hr, Intravenous, Continuous, Shiela Nunez MD, Last Rate: 100 mL/hr at 07/06/17 1346, 100 mL/hr at 07/06/17 1346    PHYSICAL EXAMINATION:   /83  Pulse 91  Temp 98.4 °F (36.9 °C) (Oral)   Resp 16  Ht 64\" " (162.6 cm)  Wt 169 lb 12.8 oz (77 kg)  SpO2 (!) 86%  BMI 29.15 kg/m2  GENERAL: Age appropriate. No acute distress.   HEENT: Head atraumatic, normocephalic.   NECK: Supple. No JVD. No lymphadenopathy.   LUNGS: Clear to auscultation bilaterally. No wheezing. No rhonchi.   HEART: Regular rate and rhythm. S1, S2, no murmurs.   ABDOMEN: Soft, nontender, nondistended. Bowel sounds positive. No  hepatosplenomegaly.   EXTREMITIES: No clubbing, cyanosis, or edema.   SKIN: No rashes. No purpura.   NEUROLOGIC: Awake and oriented x3. Strength 5 out of 5 in all muscle groups.     Admission on 07/05/2017   Component Date Value Ref Range Status   • Glucose 07/05/2017 159* 70 - 100 mg/dL Final   • BUN 07/05/2017 13  9 - 23 mg/dL Final   • Creatinine 07/05/2017 0.70  0.60 - 1.30 mg/dL Final   • Sodium 07/05/2017 136  132 - 146 mmol/L Final   • Potassium 07/05/2017 3.5  3.5 - 5.5 mmol/L Final   • Chloride 07/05/2017 100  99 - 109 mmol/L Final   • CO2 07/05/2017 27.0  20.0 - 31.0 mmol/L Final   • Calcium 07/05/2017 9.8  8.7 - 10.4 mg/dL Final   • Total Protein 07/05/2017 6.9  5.7 - 8.2 g/dL Final   • Albumin 07/05/2017 3.70  3.20 - 4.80 g/dL Final   • ALT (SGPT) 07/05/2017 47* 7 - 40 U/L Final   • AST (SGOT) 07/05/2017 120* 0 - 33 U/L Final   • Alkaline Phosphatase 07/05/2017 196* 25 - 100 U/L Final   • Total Bilirubin 07/05/2017 0.4  0.3 - 1.2 mg/dL Final   • eGFR   Amer 07/05/2017 98  >60 mL/min/1.73 Final   • Globulin 07/05/2017 3.2  gm/dL Final   • A/G Ratio 07/05/2017 1.2* 1.5 - 2.5 g/dL Final   • BUN/Creatinine Ratio 07/05/2017 18.6  7.0 - 25.0 Final   • Anion Gap 07/05/2017 9.0  3.0 - 11.0 mmol/L Final   • Lipase 07/05/2017 28  6 - 51 U/L Final   • Color, UA 07/05/2017 Yellow  Yellow, Straw Final   • Appearance, UA 07/05/2017 Clear  Clear Final   • pH, UA 07/05/2017 6.0  5.0 - 8.0 Final   • Specific Gravity, UA 07/05/2017 1.017  1.001 - 1.030 Final   • Glucose, UA 07/05/2017 Negative  Negative Final   • Ketones, UA  07/05/2017 15 mg/dL (1+)* Negative Final   • Bilirubin, UA 07/05/2017 Negative  Negative Final   • Blood, UA 07/05/2017 Negative  Negative Final   • Protein, UA 07/05/2017 30 mg/dL (1+)* Negative Final   • Leuk Esterase, UA 07/05/2017 Small (1+)* Negative Final   • Nitrite, UA 07/05/2017 Negative  Negative Final   • Urobilinogen, UA 07/05/2017 1.0 E.U./dL  0.2 - 1.0 E.U./dL Final   • Extra Tube 07/05/2017 hold for add-on   Final    Auto resulted   • Extra Tube 07/05/2017 Hold for add-ons.   Final    Auto resulted.   • Extra Tube 07/05/2017 hold for add-on   Final    Auto resulted   • Extra Tube 07/05/2017 Hold for add-ons.   Final    Auto resulted.   • WBC 07/05/2017 3.46* 3.50 - 10.80 10*3/mm3 Final   • RBC 07/05/2017 3.71* 3.89 - 5.14 10*6/mm3 Final   • Hemoglobin 07/05/2017 11.6  11.5 - 15.5 g/dL Final   • Hematocrit 07/05/2017 36.8  34.5 - 44.0 % Final   • MCV 07/05/2017 99.2* 80.0 - 99.0 fL Final   • MCH 07/05/2017 31.3* 27.0 - 31.0 pg Final   • MCHC 07/05/2017 31.5* 32.0 - 36.0 g/dL Final   • RDW 07/05/2017 14.8* 11.3 - 14.5 % Final   • RDW-SD 07/05/2017 53.9  37.0 - 54.0 fl Final   • MPV 07/05/2017 10.4  6.0 - 12.0 fL Final   • Platelets 07/05/2017 291  150 - 450 10*3/mm3 Final   • Neutrophil % 07/05/2017 68.5  41.0 - 71.0 % Final   • Lymphocyte % 07/05/2017 17.6* 24.0 - 44.0 % Final   • Monocyte % 07/05/2017 11.8  0.0 - 12.0 % Final   • Eosinophil % 07/05/2017 0.9  0.0 - 3.0 % Final   • Basophil % 07/05/2017 0.9  0.0 - 1.0 % Final   • Immature Grans % 07/05/2017 0.3  0.0 - 0.6 % Final   • Neutrophils, Absolute 07/05/2017 2.37  1.50 - 8.30 10*3/mm3 Final   • Lymphocytes, Absolute 07/05/2017 0.61  0.60 - 4.80 10*3/mm3 Final   • Monocytes, Absolute 07/05/2017 0.41  0.00 - 1.00 10*3/mm3 Final   • Eosinophils, Absolute 07/05/2017 0.03  0.00 - 0.30 10*3/mm3 Final   • Basophils, Absolute 07/05/2017 0.03  0.00 - 0.20 10*3/mm3 Final   • Immature Grans, Absolute 07/05/2017 0.01  0.00 - 0.03 10*3/mm3 Final   • Troponin  I 07/05/2017 0.03  0.00 - 0.07 ng/mL Final    Serial Number: [id number]    : [id number]   • RBC, UA 07/05/2017 0-2  None Seen, 0-2 /HPF Final   • WBC, UA 07/05/2017 0-2* None Seen /HPF Final   • Bacteria, UA 07/05/2017 None Seen  None Seen, Trace /HPF Final   • Squamous Epithelial Cells, UA 07/05/2017 0-2  None Seen, 0-2 /HPF Final   • Hyaline Casts, UA 07/05/2017 0-6  0 - 6 /LPF Final   • Methodology 07/05/2017 Automated Microscopy   Final   • Urine Culture 07/05/2017 50,000-60,000 CFU/mL    Preliminary    Mixed skin mauricio including     • Urine Culture 07/05/2017 >100,000 CFU/mL Streptococcus agalactiae (Group B)*  Preliminary    This organism is considered to be universally susceptible to penicillin.  No further antibiotic testing will be performed.   • STREP GROUPING 07/05/2017 B   Preliminary   • Lactate 07/05/2017 1.1  0.5 - 2.0 mmol/L Final    Falsely depressed results may occur on samples drawn from patients receiving N-Acetylcysteine (NAC) or Metamizole.   • Blood Culture 07/05/2017 No growth at 24 hours   Preliminary   • Troponin I 07/05/2017 0.03  0.00 - 0.07 ng/mL Final    Serial Number: [id number]    : [id number]   • Glucose 07/06/2017 84  70 - 100 mg/dL Final   • BUN 07/06/2017 7* 9 - 23 mg/dL Final   • Creatinine 07/06/2017 0.70  0.60 - 1.30 mg/dL Final   • Sodium 07/06/2017 143  132 - 146 mmol/L Final   • Potassium 07/06/2017 3.9  3.5 - 5.5 mmol/L Final   • Chloride 07/06/2017 108  99 - 109 mmol/L Final   • CO2 07/06/2017 30.0  20.0 - 31.0 mmol/L Final   • Calcium 07/06/2017 9.2  8.7 - 10.4 mg/dL Final   • Total Protein 07/06/2017 6.3  5.7 - 8.2 g/dL Final   • Albumin 07/06/2017 3.20  3.20 - 4.80 g/dL Final   • ALT (SGPT) 07/06/2017 47* 7 - 40 U/L Final   • AST (SGOT) 07/06/2017 116* 0 - 33 U/L Final   • Alkaline Phosphatase 07/06/2017 178* 25 - 100 U/L Final   • Total Bilirubin 07/06/2017 0.3  0.3 - 1.2 mg/dL Final   • eGFR   Amer 07/06/2017 98  >60 mL/min/1.73 Final   • Globulin  07/06/2017 3.1  gm/dL Final   • A/G Ratio 07/06/2017 1.0* 1.5 - 2.5 g/dL Final   • BUN/Creatinine Ratio 07/06/2017 10.0  7.0 - 25.0 Final   • Anion Gap 07/06/2017 5.0  3.0 - 11.0 mmol/L Final   • WBC 07/06/2017 3.49* 3.50 - 10.80 10*3/mm3 Final   • RBC 07/06/2017 3.51* 3.89 - 5.14 10*6/mm3 Final   • Hemoglobin 07/06/2017 10.9* 11.5 - 15.5 g/dL Final   • Hematocrit 07/06/2017 36.2  34.5 - 44.0 % Final   • MCV 07/06/2017 103.1* 80.0 - 99.0 fL Final   • MCH 07/06/2017 31.1* 27.0 - 31.0 pg Final   • MCHC 07/06/2017 30.1* 32.0 - 36.0 g/dL Final   • RDW 07/06/2017 15.2* 11.3 - 14.5 % Final   • RDW-SD 07/06/2017 57.3* 37.0 - 54.0 fl Final   • MPV 07/06/2017 10.0  6.0 - 12.0 fL Final   • Platelets 07/06/2017 279  150 - 450 10*3/mm3 Final   • Lactate 07/06/2017 0.6  0.5 - 2.0 mmol/L Final    Falsely depressed results may occur on samples drawn from patients receiving N-Acetylcysteine (NAC) or Metamizole.       Xr Chest 1 View    Result Date: 7/5/2017  Narrative: EXAM:   XR Chest, 1 View CLINICAL HISTORY:   77 years old, female; Pain; Other: Upper abdominal pain; Prior surgery; Surgery date: 6+ months; Surgery type: Right mastectomy and port placement; Additional info: Upper abdominal pain triage protocol TECHNIQUE:   Frontal view of the chest. COMPARISON:   CT CHEST W CONTRAST 5/26/2017 2:42:47 PM FINDINGS: Left midthoracic port with attached left subclavian central venous line, tip overlying the lower superior vena cava. Heart size within normal limits. No airspace consolidation identified. No visible pneumothorax or pleural effusion. Mild coarse interstitial opacities throughout both lungs, consistent with chronic scarring and/or pulmonary vascular congestion, increased from prior study.     Impression: 1. Mild coarse interstitial opacities throughout both lungs, consistent with chronic scarring and/or pulmonary vascular congestion, increased from prior study. THIS DOCUMENT HAS BEEN ELECTRONICALLY SIGNED BY JUNIOR LEVINE  MD      ASSESSMENT: The patient is a very pleasant 77 y.o. female  with Breast cancer abdominal pain      PLAN:  1.  Stage IV breast cancer: Patient scheduled for her second dose of Doxil tomorrow.  I will reschedule for next week.  2.  Abdominal pain: Resolved.  3.  Constipation: Continue laxatives.  Lactulose was just added.  4.  Nausea: Resolved.  Continue Zofran as needed.      France Chun MD  7/6/2017

## 2017-07-06 NOTE — CONSULTS
"Adult Nutrition  Assessment/PES    Patient Name:  Nava Cline  YOB: 1940  MRN: [id number]  Admit Date:  7/5/2017    Assessment Date:  7/6/2017        Reason for Assessment       07/06/17 1303    Reason for Assessment    Reason For Assessment/Visit physician consult   Pt does not meet screen criteria for nutrition per reported weight/intake history. Continue to follow per protocol.     Time Spent (min) 20              Nutrition/Diet History       07/06/17 1306    Nutrition/Diet History    Reported/Observed By Patient    Appetite Improved    Other Pt reports decreased appetite while taking chemo treatments, reports feeling like her appetite is starting to come back. Reports drinking ensures at home, willing to try clear supplement until she can begin ensure again.             Anthropometrics       07/06/17 1308    Anthropometrics    Height 162.6 cm (64\")    Weight 77 kg (169 lb 12.8 oz)    Ideal Body Weight (IBW)    Ideal Body Weight (IBW), Female 55.4    % Ideal Body Weight 139.31    Usual Body Weight (UBW)    Usual Body Weight 78 kg (172 lb)    % Usual Body Weight 98.72    Weight Loss 1.361 kg (3 lb)    Weight Loss Time Frame --   Duration of chemo treatment    Body Mass Index (BMI)    BMI (kg/m2) 29.21            Labs/Tests/Procedures/Meds       07/06/17 1312    Labs/Tests/Procedures/Meds    Labs/Tests Review Reviewed              Nutrition Prescription Ordered       07/06/17 1312    Nutrition Prescription PO    Current PO Diet Clear Liquid        Problem/Interventions:        Nutrition Intervention       07/06/17 1313    Nutrition Intervention    RD/Tech Action Recommend/ordered;Follow Tx progress;Care plan reviewd    Recommended/Ordered Supplement            Nutrition Prescription       07/06/17 1313    Nutrition Prescription PO    PO Prescription Begin/change supplement    Fluid Consistency Thin    Supplement Boost Breeze   [place]    Supplement Frequency 3 times a day    New PO Prescription " Ordered? Yes            Education/Evaluation       07/06/17 1313    Monitor/Evaluation    Monitor Per protocol;PO intake;Supplement intake   Monitor for diet change        Electronically signed by:  Christiana Askew  07/06/17 1:18 PM

## 2017-07-06 NOTE — PROGRESS NOTES
[facility] Medicine Services  INPATIENT PROGRESS NOTE    Date of Admission: 7/5/2017  Length of Stay: 1  Primary Care Physician: Yobani Lima MD    Subjective   CC: abd cramps  HPI:  abd pain gone but still no BM despite laxatives  Okay with staying until BM  Mo other problems    Review Of Systems:   Review of Systems      Objective      Temp:  [98.1 °F (36.7 °C)-98.4 °F (36.9 °C)] 98.4 °F (36.9 °C)  Heart Rate:  [84-96] 84  Resp:  [16-18] 16  BP: (125-175)/(62-97) 151/83  Physical Exam  Gen:  WD/WN resting in bed NAD  Neuro: alert and oriented, clear speech, follows commands, grossly nonfocal  HEENT:  NC/AT PERRL, OP benign  Neck:  Supple, no LAD  Heart RRR no murmur, rub, or gallop  Lungs CTA nonlabored   Abd:  Soft, nontender, no rebound or guarding, pos BS  Extrem:  No c/c/e      Results Review:    I have reviewed the labs, radiology results and diagnostic studies.      Results from last 7 days  Lab Units 07/06/17  0410   WBC 10*3/mm3 3.49*   HEMOGLOBIN g/dL 10.9*   PLATELETS 10*3/mm3 279       Results from last 7 days  Lab Units 07/06/17  0410   SODIUM mmol/L 143   POTASSIUM mmol/L 3.9   CHLORIDE mmol/L 108   CO2 mmol/L 30.0   BUN mg/dL 7*   CREATININE mg/dL 0.70   GLUCOSE mg/dL 84   CALCIUM mg/dL 9.2       Culture Data: Cultures:    Blood Culture   Date Value Ref Range Status   07/05/2017 No growth at less than 24 hours  Preliminary     Urine Culture   Date Value Ref Range Status   07/05/2017 Culture in progress  Preliminary       Radiology Data:     I have reviewed the medications.    Assessment/Plan     Problem List  Hospital Problem List     Non-intractable vomiting with nausea               Assessment/Plan:    Plan:  Abdominal cramps, n/v  -- supportive care  -- add lactulose  -- no imaging done      Breast Ca  -- on third or fourth line therapy with mets to liver.   -- history includes mastectomy, chemo, XRT, multiple regimens.   -- note rising alk phos and AST  -- onc  aware of admission     Jhopefully lactulose will work and she will go home later today    DVT prophylaxis:  Discharge Planning: I expect patient to be discharged to home in 0-1 days.    Shiela Nunez MD   07/06/17   11:49 AM

## 2017-07-06 NOTE — PLAN OF CARE
Problem: Patient Care Overview (Adult)  Goal: Plan of Care Review  Outcome: Ongoing (interventions implemented as appropriate)    07/06/17 0328   Coping/Psychosocial Response Interventions   Plan Of Care Reviewed With patient   Outcome Evaluation   Outcome Summary/Follow up Plan Patient rested well over night with no complaints. Vital signs stable.       Goal: Adult Individualization and Mutuality  Outcome: Ongoing (interventions implemented as appropriate)    Problem: Nutrition, Imbalanced: Inadequate Oral Intake (Adult)  Goal: Identify Related Risk Factors and Signs and Symptoms  Outcome: Ongoing (interventions implemented as appropriate)  Goal: Improved Oral Intake  Outcome: Ongoing (interventions implemented as appropriate)  Goal: Prevent Further Weight Loss  Outcome: Ongoing (interventions implemented as appropriate)

## 2017-07-06 NOTE — PLAN OF CARE
Problem: Patient Care Overview (Adult)  Goal: Plan of Care Review  Outcome: Ongoing (interventions implemented as appropriate)  Needs BM then can go home

## 2017-07-06 NOTE — PROGRESS NOTES
Continued Stay Note  [facility]     Patient Name: Nava Cline  MRN: [id number]  Today's Date: 7/6/2017    Admit Date: 7/5/2017          Discharge Plan     Consent obtained for the participation in the [facility] Transitions Program. Nasrin Blum RN                Discharge Codes     None            Nasrin Blum RN

## 2017-07-07 PROBLEM — K59.00 CONSTIPATION: Status: ACTIVE | Noted: 2017-01-01

## 2017-07-07 PROBLEM — R09.02 HYPOXIA: Status: ACTIVE | Noted: 2017-01-01

## 2017-07-07 NOTE — DISCHARGE PLACEMENT REQUEST
"Nava Cline (77 y.o. Female)     Date of Birth Social Security Number Address Home Phone MRN    1940  [id number] [address] [phone] [id number]    [place] Marital Status          [facility] Single       Admission Date Admission Type Admitting Provider Attending Provider Department, Room/Bed    7/5/17 Emergency Shiela Nunez MD Mini, Jocelyn, MD [address], [address]    Discharge Date Discharge Disposition Discharge Destination         Home or Self Care             Attending Provider: Shiela Nunez MD     Allergies:  Penicillin V Potassium [Penicillin V]    Isolation:  None   Infection:  None   Code Status:  FULL    Ht:  64\" (162.6 cm)   Wt:  169 lb 12.8 oz (77 kg)    Admission Cmt:  None   Principal Problem:  Constipation [K59.00]                 Active Insurance as of 7/5/2017     Primary Coverage     Payor Plan Insurance Group Employer/Plan Group    HUMANA MEDICARE REPLACEMENT HUMANA MEDICARE REPL [id number]     Payor Plan Address Payor Plan Phone Number Effective From Effective To    [address] [address] [phone] 5/1/2017     [place] [address]       Subscriber Name Subscriber Birth Date Member ID       NAVA CLINE 1940 [id number]           Secondary Coverage     Payor Plan Insurance Group Employer/Plan Group    [employer] [place] WELL[place] MEDICAID      Payor Plan Address Payor Plan Phone Number Effective From Effective To    [address] [address] [phone] 6/22/2016     [place] [address]       Subscriber Name Subscriber Birth Date Member ID       NAVA CLINE 1940 [id number]                 Emergency Contacts      (Rel.) Home Phone Work Phone Mobile Phone    Edward Cline (Daughter) [phone] -- --               Consult Notes (all)      France Chun MD at 7/5/2017 11:48 AM  Version 1 of 1     Consult Orders:    1. Inpatient Consult to Hematology and Oncology [[id number]] ordered by Shiela Nunez MD at 07/05/17 1105            "     Subjective     CHIEF COMPLAINT: Nausea and vomiting    HISTORY OF PRESENT ILLNESS:  The patient is a 77 y.o. year old female, referred by Dr. Nunez for breast cancer.  Patient has history of stage IV breast cancer currently she is on Doxil every 4 weeks last dose given on June 7, 2017.  The patient was in her usual state of health until 1 day ago she presented with abdominal pain, diffuse, associated with nausea and vomiting, she tried using her home medicine without any improvement.  This is started after heavy meal.  She had no bowel movement over the last few days.  She never had this problem before.  Abdominal pain is 6 out of 10 severity, radiates to her back.  Denied any fever or chills no recent travel or ill contacts.  Patient presented to [facility] emergency room yesterday she was admitted to the hospitalist service for dehydration as well as further management of her symptoms.  I was consulted by Dr. Nunez to assist in the patient's care.  When I so the patient she is laying comfortable in bed.  She denies any fever or chills she is feeling significantly better.    REVIEW OF SYSTEMS:  A 14 point review of systems was performed and is negative except as noted above.    Past Medical History:   Diagnosis Date   • Arthritis    • Breast cancer 07/2012   • Disease of thyroid gland    • Drug therapy    • Hx of radiation therapy    • Hypertension    • Liver metastasis 8/17/2016   • Osteoporosis        No current facility-administered medications on file prior to encounter.      Current Outpatient Prescriptions on File Prior to Encounter   Medication Sig Dispense Refill   • albuterol (VENTOLIN HFA) 108 (90 BASE) MCG/ACT inhaler Inhale 2 puffs Every 6 (Six) Hours As Needed for Wheezing or Shortness of Air. 18 g 5   • amLODIPine (NORVASC) 10 MG tablet Take 10 mg by mouth daily.     • hydrALAZINE (APRESOLINE) 25 MG tablet Take 25 mg by mouth 3 (three) times a day.     • lidocaine-prilocaine (EMLA)  2.5-2.5 % cream Apply  topically As Needed for mild pain (1-3) (prior to port access). 30 g 2   • Multiple Vitamin (TAB-A-TESS) tablet Take 1 tablet by mouth daily.  0   • quinapril (ACCUPRIL) 10 MG tablet Take 10 mg by mouth every night.     • [DISCONTINUED] erythromycin base (E-MYCIN) 250 MG tablet Take 250 mg by mouth 4 (Four) Times a Day.     • [DISCONTINUED] HYDROcodone-acetaminophen (NORCO) 5-325 MG per tablet Take 1 tablet by mouth Every 8 (Eight) Hours As Needed for Moderate Pain (4-6). 90 tablet 0   • [DISCONTINUED] nystatin (MYCOSTATIN) 125216 UNIT/ML suspension Swish and swallow 5 mL 4 (Four) Times a Day. 473 mL 2   • [DISCONTINUED] promethazine-dextromethorphan (PROMETHAZINE-DM) 6.25-15 MG/5ML syrup take 5-10 milliliter EVERY NIGHT if needed  0       Allergies   Allergen Reactions   • Penicillin V Potassium [Penicillin V] Hives       Past Surgical History:   Procedure Laterality Date   • BREAST BIOPSY     • MASTECTOMY Right 2012   • MASTECTOMY MODIFIED RADICAL W/ AXILLARY LYMPH NODES W/ OR W/O PECTORALIS MINOR     • PORTACATH PLACEMENT         OB History    Para Term  AB SAB TAB Ectopic Multiple Living   5 5 5             # Outcome Date GA Lbr Jamar/2nd Weight Sex Delivery Anes PTL Lv   5 Term            4 Term            3 Term            2 Term            1 Term                   Social History     Social History   • Marital status: Single     Spouse name: N/A   • Number of children: N/A   • Years of education: N/A     Social History Main Topics   • Smoking status: Former Smoker   • Smokeless tobacco: Never Used   • Alcohol use No   • Drug use: No   • Sexual activity: Not Asked     Other Topics Concern   • None     Social History Narrative   • None       Family History   Problem Relation Age of Onset   • No Known Problems Other    • Breast cancer Neg Hx    • Ovarian cancer Neg Hx        Objective     Vitals:    17 0600 17 0630 17 0730 17 1000   BP: 144/76 149/72  152/70 157/90   Pulse: 85 79 94 98   Resp:       Temp:       SpO2: 93% 94% 92% 92%   Weight:       Height:           General: well appearing female in no acute distress  HEENT: sclera anicteric, oropharynx clear  Lymphatics: no cervical, supraclavicular, or axillary adenopathy  Cardiovascular: regular rate and rhythm, no murmurs  Lungs: clear to auscultation bilaterally  Abdomen: soft, nontender, nondistended.  No palpable organomegaly  Extremeties: no lower extremity edema  Skin: no rashes, lesions, bruising, or petechiae      Admission on 07/05/2017   Component Date Value Ref Range Status   • Glucose 07/05/2017 159* 70 - 100 mg/dL Final   • BUN 07/05/2017 13  9 - 23 mg/dL Final   • Creatinine 07/05/2017 0.70  0.60 - 1.30 mg/dL Final   • Sodium 07/05/2017 136  132 - 146 mmol/L Final   • Potassium 07/05/2017 3.5  3.5 - 5.5 mmol/L Final   • Chloride 07/05/2017 100  99 - 109 mmol/L Final   • CO2 07/05/2017 27.0  20.0 - 31.0 mmol/L Final   • Calcium 07/05/2017 9.8  8.7 - 10.4 mg/dL Final   • Total Protein 07/05/2017 6.9  5.7 - 8.2 g/dL Final   • Albumin 07/05/2017 3.70  3.20 - 4.80 g/dL Final   • ALT (SGPT) 07/05/2017 47* 7 - 40 U/L Final   • AST (SGOT) 07/05/2017 120* 0 - 33 U/L Final   • Alkaline Phosphatase 07/05/2017 196* 25 - 100 U/L Final   • Total Bilirubin 07/05/2017 0.4  0.3 - 1.2 mg/dL Final   • eGFR   Amer 07/05/2017 98  >60 mL/min/1.73 Final   • Globulin 07/05/2017 3.2  gm/dL Final   • A/G Ratio 07/05/2017 1.2* 1.5 - 2.5 g/dL Final   • BUN/Creatinine Ratio 07/05/2017 18.6  7.0 - 25.0 Final   • Anion Gap 07/05/2017 9.0  3.0 - 11.0 mmol/L Final   • Lipase 07/05/2017 28  6 - 51 U/L Final   • Color, UA 07/05/2017 Yellow  Yellow, Straw Final   • Appearance, UA 07/05/2017 Clear  Clear Final   • pH, UA 07/05/2017 6.0  5.0 - 8.0 Final   • Specific Gravity, UA 07/05/2017 1.017  1.001 - 1.030 Final   • Glucose, UA 07/05/2017 Negative  Negative Final   • Ketones, UA 07/05/2017 15 mg/dL (1+)* Negative Final   •  Bilirubin, UA 07/05/2017 Negative  Negative Final   • Blood, UA 07/05/2017 Negative  Negative Final   • Protein, UA 07/05/2017 30 mg/dL (1+)* Negative Final   • Leuk Esterase, UA 07/05/2017 Small (1+)* Negative Final   • Nitrite, UA 07/05/2017 Negative  Negative Final   • Urobilinogen, UA 07/05/2017 1.0 E.U./dL  0.2 - 1.0 E.U./dL Final   • Extra Tube 07/05/2017 hold for add-on   Final    Auto resulted   • Extra Tube 07/05/2017 Hold for add-ons.   Final    Auto resulted.   • Extra Tube 07/05/2017 hold for add-on   Final    Auto resulted   • Extra Tube 07/05/2017 Hold for add-ons.   Final    Auto resulted.   • WBC 07/05/2017 3.46* 3.50 - 10.80 10*3/mm3 Final   • RBC 07/05/2017 3.71* 3.89 - 5.14 10*6/mm3 Final   • Hemoglobin 07/05/2017 11.6  11.5 - 15.5 g/dL Final   • Hematocrit 07/05/2017 36.8  34.5 - 44.0 % Final   • MCV 07/05/2017 99.2* 80.0 - 99.0 fL Final   • MCH 07/05/2017 31.3* 27.0 - 31.0 pg Final   • MCHC 07/05/2017 31.5* 32.0 - 36.0 g/dL Final   • RDW 07/05/2017 14.8* 11.3 - 14.5 % Final   • RDW-SD 07/05/2017 53.9  37.0 - 54.0 fl Final   • MPV 07/05/2017 10.4  6.0 - 12.0 fL Final   • Platelets 07/05/2017 291  150 - 450 10*3/mm3 Final   • Neutrophil % 07/05/2017 68.5  41.0 - 71.0 % Final   • Lymphocyte % 07/05/2017 17.6* 24.0 - 44.0 % Final   • Monocyte % 07/05/2017 11.8  0.0 - 12.0 % Final   • Eosinophil % 07/05/2017 0.9  0.0 - 3.0 % Final   • Basophil % 07/05/2017 0.9  0.0 - 1.0 % Final   • Immature Grans % 07/05/2017 0.3  0.0 - 0.6 % Final   • Neutrophils, Absolute 07/05/2017 2.37  1.50 - 8.30 10*3/mm3 Final   • Lymphocytes, Absolute 07/05/2017 0.61  0.60 - 4.80 10*3/mm3 Final   • Monocytes, Absolute 07/05/2017 0.41  0.00 - 1.00 10*3/mm3 Final   • Eosinophils, Absolute 07/05/2017 0.03  0.00 - 0.30 10*3/mm3 Final   • Basophils, Absolute 07/05/2017 0.03  0.00 - 0.20 10*3/mm3 Final   • Immature Grans, Absolute 07/05/2017 0.01  0.00 - 0.03 10*3/mm3 Final   • Troponin I 07/05/2017 0.03  0.00 - 0.07 ng/mL Final     Serial Number: [id number]    : [id number]   • RBC, UA 07/05/2017 0-2  None Seen, 0-2 /HPF Final   • WBC, UA 07/05/2017 0-2* None Seen /HPF Final   • Bacteria, UA 07/05/2017 None Seen  None Seen, Trace /HPF Final   • Squamous Epithelial Cells, UA 07/05/2017 0-2  None Seen, 0-2 /HPF Final   • Hyaline Casts, UA 07/05/2017 0-6  0 - 6 /LPF Final   • Methodology 07/05/2017 Automated Microscopy   Final   • Lactate 07/05/2017 1.1  0.5 - 2.0 mmol/L Final    Falsely depressed results may occur on samples drawn from patients receiving N-Acetylcysteine (NAC) or Metamizole.   • Troponin I 07/05/2017 0.03  0.00 - 0.07 ng/mL Final    Serial Number: [id number]    : [id number]        Xr Chest 1 View    Result Date: 7/5/2017  Narrative: EXAM:   XR Chest, 1 View CLINICAL HISTORY:   77 years old, female; Pain; Other: Upper abdominal pain; Prior surgery; Surgery date: 6+ months; Surgery type: Right mastectomy and port placement; Additional info: Upper abdominal pain triage protocol TECHNIQUE:   Frontal view of the chest. COMPARISON:   CT CHEST W CONTRAST 5/26/2017 2:42:47 PM FINDINGS: Left midthoracic port with attached left subclavian central venous line, tip overlying the lower superior vena cava. Heart size within normal limits. No airspace consolidation identified. No visible pneumothorax or pleural effusion. Mild coarse interstitial opacities throughout both lungs, consistent with chronic scarring and/or pulmonary vascular congestion, increased from prior study.     Impression: 1. Mild coarse interstitial opacities throughout both lungs, consistent with chronic scarring and/or pulmonary vascular congestion, increased from prior study. THIS DOCUMENT HAS BEEN ELECTRONICALLY SIGNED BY JUNIOR LEVINE MD      ASSESSMENT 77-year-old female with metastatic breast cancer and currently with abdominal pain nausea vomiting and constipation    PROBLEM LIST   1. T2N1M0 invasive ductal carcinoma of the right breast, tumor  greatest  dimension 2.5 cm, estrogen receptor strongly positive, progesterone receptor  intermediate, HER-2/jean marie negative by IHC score of 0, 2 out of 8 positive  axillary lymph nodes.   2. Status post right mastectomy with axillary lymph node dissection done by  Dr. Heart on 07/30/2012.   3. Status post 4 cycles of adjuvant chemotherapy using Taxotere and Cytoxan  from 10/08/2012 until 12/10/2012.   4. Took Arimidex 1 mg p.o. daily from 01/10/2013 until 04/28/2015.   5. Relapsed disease documented on CAT scan of the chest, abdomen, and pelvis  done 04/27/2015. Revealed right chest wall mass as well as right axillary  lymphadenopathy. Biopsy done 04/17/2015 revealed invasive ductal carcinoma.   6. Enrolled on ECoG  protocol, Aromasin with placebo versus Aromasin and  Entinostat, 05/08/2015.   7. Stopped Aromasin 04/25/2016 secondary to new liver metastases documented on  CAT scan 04/22/2016.   8. Completed adjuvant radiation treatment to the right chest wall 12/09/2015.  9. Status post right axillary dissection done by Dr. Heart 03/14/2016.   10. Progressive disease documented on CAT scan done 04/22/2016 with new liver  metastases.   11. Started Faslodex Ibrance 05/02/2016.  12. Neutropenia secondary Ibrance. The patient is on 100 mg, 3 weeks on and 1  week off.   13. Rheumatoid arthritis.   14. Worsening metastatic disease documented on CT scans done on 07/25/2016.  15. Started on carboplatin AUC 2 with gemzar given two weeks on, one week off. She is status post three cycle of treatment.   16. Mixed response to treatment documented on CAT scans done 11/15/2016.  17. On gemzar single agent, status post 6 cycles.  18. Progressive disease documented on CAT scan done February 4, 2017.  19. Started weekly Adriamycin February 14, 2017.   20. Changed to Doxil after maximum response from weekly Adriamycin, started on 06/06/2017, status post 1 dose.   21. Chemotherapy-induce nausea and vomiting  22.  Constipation  23.   Abdominal pain  24.  Slightly elevated liver enzymes     PLAN  1.  Bowel regimen, patient is currently on Colace and MiraLAX.  I'll consider adding lactulose if needed.  2.  IV fluid with 100 ML/HR normal saline.  3.  IV antiemetics with scheduled Zofran as well as as needed Phenergan.  4.  Consider repeating imaging if her symptoms don't improve.  5.  Monitor blood work.  6.  Okay to discharge home in the morning if she is doing better and she has a bowel movement.  7.  Patient will follow up with me as scheduled for next round of chemotherapy.    France Chun MD    7/5/2017       Electronically signed by France Chun MD at 7/5/2017  4:48 PM        Shiela Nunez MD Physician Addendum  Discharge Summaries Date of Service: 7/7/2017  9:15 AM         []Hide copied text  []Hover for attribution information       [facility] Medicine Services  DISCHARGE SUMMARY         Date of Admission: 7/5/2017  Date of Discharge: 7/7/2017  Primary Care Physician: Yobani Lima MD  Consulting Physician(s)     Provider Relationship     France Chun MD Consulting Physician            Discharge Diagnoses:        Active Hospital Problems (** Indicates Principal Problem)     Diagnosis Date Noted   • **Constipation [K59.00] 07/07/2017   • Non-intractable vomiting with nausea [R11.2] 07/05/2017       Resolved Hospital Problems     Diagnosis Date Noted Date Resolved   No resolved problems to display.         Presenting Problem/History of Present Illness  Non-intractable vomiting with nausea, unspecified vomiting type [R11.2]  Non-intractable vomiting with nausea, unspecified vomiting type [R11.2]  Non-intractable vomiting with nausea, unspecified vomiting type [R11.2]      Chief Complaint on Day of Discharge: Feels good, had 4 BMs yst evening at last. Eager to go home.        Hospital Course  Patient is a 77 y.o. female presented with abdominal lpain and nausea. She is currently undergoing chemo for breast CA  "witih liver mets.      Her abdominal exam was benign. Constipation was diagnosed by history. She received an aggressive bowel regimen and her pain resolved after several BMs. She feels well and will follow up with oncology as usual. She was seen by Dr. Chun during her stay.       Hypoxia: Her o2 sats were routinely in the low 80s off oxygen. She is being arranged for home o2.      Procedures Performed           Consults:   Consults     Date and Time Order Name Status Description     7/5/2017 1105 Inpatient Consult to Hematology and Oncology Completed              Pertinent Test Results:     Results from last 7 days  Lab Units 07/06/17  0410 07/05/17  0500   WBC 10*3/mm3 3.49* 3.46*   HEMOGLOBIN g/dL 10.9* 11.6   HEMATOCRIT % 36.2 36.8   PLATELETS 10*3/mm3 279 291         Results from last 7 days  Lab Units 07/06/17  0410 07/05/17  0500   SODIUM mmol/L 143 136   POTASSIUM mmol/L 3.9 3.5   CHLORIDE mmol/L 108 100   CO2 mmol/L 30.0 27.0   BUN mg/dL 7* 13   CREATININE mg/dL 0.70 0.70   GLUCOSE mg/dL 84 159*   CALCIUM mg/dL 9.2 9.8                        Condition on Discharge: Asymtpomatic, good     Physical Exam on Discharge:/75 (BP Location: Left arm, Patient Position: Lying)  Pulse 90  Temp 98.2 °F (36.8 °C) (Oral)  Resp 16  Ht 64\" (162.6 cm)  Wt 169 lb 12.8 oz (77 kg)  SpO2 100%  BMI 29.15 kg/m2  Physical Exam  Gen: WD/WN woman in bed, NAD  Neuro: alert and oriented, clear speech, follows commands, grossly nonfocal  HEENT: NC/AT PERRL, OP benign  Neck: Supple, no LAD  Heart RRR no murmur, rub, or gallop  Lungs CTA nonlabored  Abd: Soft, nontender, no rebound or guarding, pos BS  Extrem: No c/c/e  Skin warm and dry  Psych nl mood and affect        Discharge Disposition  Home or Self Care     Discharge Medications    Nava Cline   Home Medication Instructions CRISTINA:[id number]     Printed on:07/07/17 0916   Medication Information                                       albuterol (VENTOLIN HFA) 108 (90 " BASE) MCG/ACT inhaler  Inhale 2 puffs Every 6 (Six) Hours As Needed for Wheezing or Shortness of Air.                   amLODIPine (NORVASC) 10 MG tablet  Take 10 mg by mouth daily.                   hydrALAZINE (APRESOLINE) 25 MG tablet  Take 50 mg by mouth Every Morning.                   hydrALAZINE (APRESOLINE) 25 MG tablet  Take 25 mg by mouth At Night As Needed.                   lidocaine-prilocaine (EMLA) 2.5-2.5 % cream  Apply topically As Needed for mild pain (1-3) (prior to port access).                   Multiple Vitamin (TAB-A-TESS) tablet  Take 1 tablet by mouth daily.                   polyethylene glycol (MIRALAX) packet  Take 17 g by mouth Daily.                   quinapril (ACCUPRIL) 10 MG tablet  Take 10 mg by mouth Every Morning.                         Discharge Diet:  per routine     Discharge Care Plan / Instructions: folllow up with oncology appts     Activity at Discharge:  as tolerated     Follow-up Appointments  Future Appointments  Date Time Provider Department Center   7/14/2017 2:00 PM LIDIA Khan [facility]   7/14/2017 2:30 PM CHAIR 5  [facility]            Test Results Pending at Discharge      Order Current Status     Blood Culture Preliminary result             Shiela Nunez MD 07/07/17 9:16 AM     Time: 30 mins     Please note that portions of this note may have been completed with a voice recognition program. Efforts were made to edit the dictations, but occasionally words are mistranscribed.             Revision History       Date/Time User Provider Type Action     7/7/2017 12:08 PM Shiela Nunez MD Physician Addend     7/7/2017  9:19 AM Shiela Nunez MD Physician Sign     View Details Report          Routing History       Date/Time From To Method     7/7/2017 12:08 PM MD Yobani Alaniz MD Fax                   07/07/17 1046  --  84  --  --  room air, at rest   88       07/07/17 1016  --  --  --  --  2 L nasal cannula  100

## 2017-07-07 NOTE — PROGRESS NOTES
Continued Stay Note  [facility]     Patient Name: Nava Cline  MRN: [id number]  Today's Date: 7/7/2017    Admit Date: 7/5/2017          Discharge Plan       07/07/17 1138    Case Management/Social Work Plan    Plan Home    Patient/Family In Agreement With Plan yes    Additional Comments Mrs. Cline is requiring continuous oxygen at 2 L NC for hypoxia. She is being discharged home today and is going to need this arranged. Oxygen has bee arranged through [employer]. They are going to deliver a portable tank to the bedside prior to discharge.              Discharge Codes     None        Expected Discharge Date and Time     Expected Discharge Date Expected Discharge Time    Jul 7, 2017             Christopher Neumann

## 2017-07-07 NOTE — DISCHARGE SUMMARY
[facility] Medicine Services  DISCHARGE SUMMARY       Date of Admission: 7/5/2017  Date of Discharge:  7/7/2017  Primary Care Physician: Yobani Lima MD  Consulting Physician(s)     Provider Relationship    France Chun MD Consulting Physician          Discharge Diagnoses:  Active Hospital Problems (** Indicates Principal Problem)    Diagnosis Date Noted   • **Constipation [K59.00] 07/07/2017   • Non-intractable vomiting with nausea [R11.2] 07/05/2017      Resolved Hospital Problems    Diagnosis Date Noted Date Resolved   No resolved problems to display.       Presenting Problem/History of Present Illness  Non-intractable vomiting with nausea, unspecified vomiting type [R11.2]  Non-intractable vomiting with nausea, unspecified vomiting type [R11.2]  Non-intractable vomiting with nausea, unspecified vomiting type [R11.2]     Chief Complaint on Day of Discharge:   Feels good, had 4 BMs yst evening at last.  Eager to go home.      Hospital Course  Patient is a 77 y.o. female presented with abdominal lpain and nausea.  She is currently undergoing chemo for breast CA witih liver mets.      Her abdominal exam was benign.  Constipation was diagnosed by history.  She received an aggressive bowel regimen and her pain resolved after several BMs.  She feels well and will follow up with oncology as usual.  She was seen by Dr. Chun during her stay.      Hypoxia:  Her o2 sats were routinely in the low 80s off oxygen.  She is being arranged for home o2.     Procedures Performed         Consults:   Consults     Date and Time Order Name Status Description    7/5/2017 1105 Inpatient Consult to Hematology and Oncology Completed           Pertinent Test Results:    Results from last 7 days  Lab Units 07/06/17  0410 07/05/17  0500   WBC 10*3/mm3 3.49* 3.46*   HEMOGLOBIN g/dL 10.9* 11.6   HEMATOCRIT % 36.2 36.8   PLATELETS 10*3/mm3 279 291       Results from last 7 days  Lab Units 07/06/17  0410  "07/05/17  0500   SODIUM mmol/L 143 136   POTASSIUM mmol/L 3.9 3.5   CHLORIDE mmol/L 108 100   CO2 mmol/L 30.0 27.0   BUN mg/dL 7* 13   CREATININE mg/dL 0.70 0.70   GLUCOSE mg/dL 84 159*   CALCIUM mg/dL 9.2 9.8                 Condition on Discharge:  Asymtpomatic, good    Physical Exam on Discharge:/75 (BP Location: Left arm, Patient Position: Lying)  Pulse 90  Temp 98.2 °F (36.8 °C) (Oral)   Resp 16  Ht 64\" (162.6 cm)  Wt 169 lb 12.8 oz (77 kg)  SpO2 100%  BMI 29.15 kg/m2  Physical Exam  Gen:  WD/WN woman in bed, NAD  Neuro: alert and oriented, clear speech, follows commands, grossly nonfocal  HEENT:  NC/AT PERRL, OP benign  Neck:  Supple, no LAD  Heart RRR no murmur, rub, or gallop  Lungs CTA nonlabored  Abd:  Soft, nontender, no rebound or guarding, pos BS  Extrem:  No c/c/e  Skin warm and dry  Psych nl mood and affect      Discharge Disposition  Home or Self Care    Discharge Medications   Nava Cline   Home Medication Instructions CRISTINA:[id number]    Printed on:07/07/17 0916   Medication Information                      albuterol (VENTOLIN HFA) 108 (90 BASE) MCG/ACT inhaler  Inhale 2 puffs Every 6 (Six) Hours As Needed for Wheezing or Shortness of Air.             amLODIPine (NORVASC) 10 MG tablet  Take 10 mg by mouth daily.             hydrALAZINE (APRESOLINE) 25 MG tablet  Take 50 mg by mouth Every Morning.             hydrALAZINE (APRESOLINE) 25 MG tablet  Take 25 mg by mouth At Night As Needed.             lidocaine-prilocaine (EMLA) 2.5-2.5 % cream  Apply  topically As Needed for mild pain (1-3) (prior to port access).             Multiple Vitamin (TAB-A-TESS) tablet  Take 1 tablet by mouth daily.             polyethylene glycol (MIRALAX) packet  Take 17 g by mouth Daily.             quinapril (ACCUPRIL) 10 MG tablet  Take 10 mg by mouth Every Morning.                 Discharge Diet:  per routine    Discharge Care Plan / Instructions:  folllow up with oncology appts    Activity at " Discharge:  as tolerated    Follow-up Appointments  Future Appointments  Date Time Provider Department Center   7/14/2017 2:00 PM Eliza Heart, APRN [facility]   7/14/2017 2:30 PM CHAIR 5  [facility]         Test Results Pending at Discharge   Order Current Status    Blood Culture Preliminary result           Shieal Nunez MD 07/07/17 9:16 AM    Time: 30 mins    Please note that portions of this note may have been completed with a voice recognition program. Efforts were made to edit the dictations, but occasionally words are mistranscribed.

## 2017-07-07 NOTE — DISCHARGE PLACEMENT REQUEST
"Danelle Cline (77 y.o. Female)     Date of Birth Social Security Number Address Home Phone MRN    [id number]  [id number] [address] [phone] [id number]    [employer] Marital Status          Orthodox Single       Admission Date Admission Type Admitting Provider Attending Provider Department, Room/Bed    7/5/17 Emergency Shiela Nunez MD Mini, Jocelyn, MD [facility] 4H, S487/1    Discharge Date Discharge Disposition Discharge Destination         Home or Self Care             Attending Provider: Shiela Nunez MD     Allergies:  Penicillin V Potassium [Penicillin V]    Isolation:  None   Infection:  None   Code Status:  FULL    Ht:  64\" (162.6 cm)   Wt:  169 lb 12.8 oz (77 kg)    Admission Cmt:  None   Principal Problem:  Constipation [K59.00]                 Active Insurance as of 7/5/2017     Primary Coverage     Payor Plan Insurance Group Employer/Plan Group    HUMANA MEDICARE REPLACEMENT HUMANA MEDICARE REPL [id number]     Payor Plan Address Payor Plan Phone Number Effective From Effective To    [address] [address] [phone] 5/1/2017     [place] [address]       Subscriber Name Subscriber Birth Date Member ID       DANELLE CLINE 1940 [id number]           Secondary Coverage     Payor Plan Insurance Group Employer/Plan Group    [employer]LL[employer] [place] WELL[place]ARE MEDICAID      Payor Plan Address Payor Plan Phone Number Effective From Effective To    [address] [address] [phone] 6/22/2016     [place] [address]       Subscriber Name Subscriber Birth Date Member ID       DANELLE CLINE 1940 [id number]                 Emergency Contacts      (Rel.) Home Phone Work Phone Mobile Phone    Edward Cline (Daughter) [phone] -- --                                          Oxygen Therapy [EQ60] (Order [id number])   General Supply    Date: 7/7/2017   Department: [address]   Ordering/Authorizing: Shiela Nunez MD           Order History  Outpatient       " Date/Time Action Taken User Additional Information       07/07/17 1103 Sign Christopher Neumann Ordering Mode: Verbal with readback           Order Details        Frequency Duration Priority Order Class       None None Routine Hospital Performed           Start Date/Time        Start Date       07/07/17           Order Questions        Question Answer Comment       Delivery Modality Nasal Cannula        Liters Per Minute: 2        Duration: Continuous        Equipment  Oxygen Concentrator &  &  Portable Gaseous Oxygen System & Portable Oxygen Contents Gaseous &  Conserving Regulator        The face to face evaluation was performed on 7/7/2017        Length of Need (99 Months = Lifetime) 99 Months = Lifetime        Note:  Custom values to enter length of need for DME           Process Instructions        By signing this order, the Authorizing Provider is attesting that they have completed a face-to-face evaluation of the patient, to determine their need for this equipment, in the last six months. If additional providers completed this evaluation, please enter their names in the second question below.              Verbal Order Info        Action Created on Order Mode Entered by Responsible Provider Signed by Signed on       Ordering 07/07/17 1103 Verbal with readback Christopher Nunez MD             Source Order Set / Preference List        Preference List       AMB SUPPLIES [1416]           Clinical Indications           ICD-10-CM ICD-9-CM       Hypoxia     R09.02 799.02       Malignant neoplasm of central portion of right female breast     C50.111 174.1       Lung nodules     R91.8 793.19           Reprint Order Requisition        OXYGEN THERAPY (Order #[id number]) on 7/7/17         Encounter-Level Cardiology Documents:        There are no encounter-level cardiology documents.         Encounter        View Encounter         Order Provider Info            Office phone Pager E-mail        Ordering User Christopher Neumann -- -- --       Authorizing Provider Shiela Nunez [phone] -- --       Attending Provider Shiela Nunez [phone] -- --       Entered By Christopher Neumann -- -- --       Ordering Provider Shiela Nunez [phone] -- --           Linked Charges        No charges linked to this procedure         Order Transmittal Tracking        OXYGEN THERAPY (Order #[id number]) on 7/7/17         Authorized by: Shiela Nunez MD (NPI: [id number])

## 2017-07-09 NOTE — ED PROVIDER NOTES
Subjective   HPI Comments: Pt is a 78 yo female with a currently undergoing chemo treatments for breast cancer who presents with nausea and vomiting.  She states that she was feeling well until after dinner last night when she began to experience nausea and vomiting.    Patient is a 77 y.o. female presenting with vomiting.   History provided by:  Patient  Vomiting   The primary symptoms include fatigue, nausea and vomiting. Primary symptoms do not include fever, diarrhea, melena, hematochezia, dysuria, myalgias or rash. The illness began today. The onset was sudden. The problem has not changed since onset.  The illness is also significant for constipation. The illness does not include chills, back pain or itching. Associated medical issues do not include inflammatory bowel disease, gallstones or gastric bypass.       Review of Systems   Constitutional: Positive for fatigue. Negative for chills and fever.   Gastrointestinal: Positive for constipation, nausea and vomiting. Negative for diarrhea, hematochezia and melena.   Genitourinary: Negative for dysuria.   Musculoskeletal: Negative for back pain and myalgias.   Skin: Negative for itching and rash.   All other systems reviewed and are negative.      Past Medical History:   Diagnosis Date   • Arthritis    • Breast cancer 07/2012   • Disease of thyroid gland    • Drug therapy    • Hx of radiation therapy    • Hypertension    • Liver metastasis 8/17/2016   • Osteoporosis        Allergies   Allergen Reactions   • Penicillin V Potassium [Penicillin V] Hives       Past Surgical History:   Procedure Laterality Date   • BREAST BIOPSY     • MASTECTOMY Right 07/2012   • MASTECTOMY MODIFIED RADICAL W/ AXILLARY LYMPH NODES W/ OR W/O PECTORALIS MINOR     • PORTACATH PLACEMENT         Family History   Problem Relation Age of Onset   • No Known Problems Other    • Breast cancer Neg Hx    • Ovarian cancer Neg Hx        Social History     Social History   • Marital status: Single      Spouse name: N/A   • Number of children: N/A   • Years of education: N/A     Social History Main Topics   • Smoking status: Former Smoker   • Smokeless tobacco: Never Used   • Alcohol use No   • Drug use: No   • Sexual activity: Not Asked     Other Topics Concern   • None     Social History Narrative   • None           Objective   Physical Exam   Constitutional: She is oriented to person, place, and time. No distress.   HENT:   Head: Normocephalic and atraumatic.   Eyes: Conjunctivae and EOM are normal. Pupils are equal, round, and reactive to light.   Neck: Normal range of motion. Neck supple.   Cardiovascular: Normal rate, regular rhythm and normal heart sounds.    Pulmonary/Chest: Effort normal and breath sounds normal. No respiratory distress.   Abdominal: Soft. Bowel sounds are normal. She exhibits no distension and no mass. There is tenderness. There is no rebound.   Mild epigastric discomfort.   Musculoskeletal: Normal range of motion.   Neurological: She is alert and oriented to person, place, and time.   Skin: Skin is warm and dry.   Psychiatric: She has a normal mood and affect.   Nursing note and vitals reviewed.      Procedures         ED Course  ED Course      Results for DANELLE BOJORQUEZ (MRN [id number]) as of 7/9/2017 03:11   Ref. Range 7/5/2017 05:00   Glucose Latest Ref Range: 70 - 100 mg/dL 159 (H)   Sodium Latest Ref Range: 132 - 146 mmol/L 136   Potassium Latest Ref Range: 3.5 - 5.5 mmol/L 3.5   CO2 Latest Ref Range: 20.0 - 31.0 mmol/L 27.0   Chloride Latest Ref Range: 99 - 109 mmol/L 100   Anion Gap Latest Ref Range: 3.0 - 11.0 mmol/L 9.0   Creatinine Latest Ref Range: 0.60 - 1.30 mg/dL 0.70   BUN Latest Ref Range: 9 - 23 mg/dL 13   BUN/Creatinine Ratio Latest Ref Range: 7.0 - 25.0  18.6   Calcium Latest Ref Range: 8.7 - 10.4 mg/dL 9.8   eGFR  African Amer Latest Ref Range: >60 mL/min/1.73 98   Alkaline Phosphatase Latest Ref Range: 25 - 100 U/L 196 (H)   Total Protein Latest Ref Range: 5.7 -  8.2 g/dL 6.9   ALT (SGPT) Latest Ref Range: 7 - 40 U/L 47 (H)   AST (SGOT) Latest Ref Range: 0 - 33 U/L 120 (H)   Total Bilirubin Latest Ref Range: 0.3 - 1.2 mg/dL 0.4   Albumin Latest Ref Range: 3.20 - 4.80 g/dL 3.70   Globulin Latest Units: gm/dL 3.2   A/G Ratio Latest Ref Range: 1.5 - 2.5 g/dL 1.2 (L)   Lipase Latest Ref Range: 6 - 51 U/L 28   WBC Latest Ref Range: 3.50 - 10.80 10*3/mm3 3.46 (L)   RBC Latest Ref Range: 3.89 - 5.14 10*6/mm3 3.71 (L)   Hemoglobin Latest Ref Range: 11.5 - 15.5 g/dL 11.6   Hematocrit Latest Ref Range: 34.5 - 44.0 % 36.8   RDW Latest Ref Range: 11.3 - 14.5 % 14.8 (H)   MCV Latest Ref Range: 80.0 - 99.0 fL 99.2 (H)   MCH Latest Ref Range: 27.0 - 31.0 pg 31.3 (H)   MCHC Latest Ref Range: 32.0 - 36.0 g/dL 31.5 (L)   MPV Latest Ref Range: 6.0 - 12.0 fL 10.4   Platelets Latest Ref Range: 150 - 450 10*3/mm3 291   RDW-SD Latest Ref Range: 37.0 - 54.0 fl 53.9   Neutrophil % Latest Ref Range: 41.0 - 71.0 % 68.5   Lymphocyte % Latest Ref Range: 24.0 - 44.0 % 17.6 (L)   Monocyte % Latest Ref Range: 0.0 - 12.0 % 11.8   Eosinophil % Latest Ref Range: 0.0 - 3.0 % 0.9   Basophil % Latest Ref Range: 0.0 - 1.0 % 0.9   Immature Grans % Latest Ref Range: 0.0 - 0.6 % 0.3   Neutrophils, Absolute Latest Ref Range: 1.50 - 8.30 10*3/mm3 2.37   Lymphocytes, Absolute Latest Ref Range: 0.60 - 4.80 10*3/mm3 0.61   Monocytes, Absolute Latest Ref Range: 0.00 - 1.00 10*3/mm3 0.41   Eosinophils, Absolute Latest Ref Range: 0.00 - 0.30 10*3/mm3 0.03   Basophils, Absolute Latest Ref Range: 0.00 - 0.20 10*3/mm3 0.03   Immature Grans, Absolute Latest Ref Range: 0.00 - 0.03 10*3/mm3 0.01     Results for DANELLE BOJORQUEZ (MRN [id number]) as of 7/9/2017 03:11   Ref. Range 7/5/2017 05:34   Color, UA Latest Ref Range: Yellow, Straw  Yellow   Appearance, UA Latest Ref Range: Clear  Clear   Specific [place], UA Latest Ref Range: 1.001 - 1.030  1.017   pH, UA Latest Ref Range: 5.0 - 8.0  6.0   Glucose, UA Latest Ref Range:  Negative  Negative   Ketones, UA Latest Ref Range: Negative  15 mg/dL (1+) (A)   Blood, UA Latest Ref Range: Negative  Negative   Nitrite, UA Latest Ref Range: Negative  Negative   Leuk Esterase, UA Latest Ref Range: Negative  Small (1+) (A)   Protein, UA Latest Ref Range: Negative  30 mg/dL (1+) (A)   Bilirubin, UA Latest Ref Range: Negative  Negative   Urobilinogen, UA Latest Ref Range: 0.2 - 1.0 E.U./dL  1.0 E.U./dL   RBC, UA Latest Ref Range: None Seen, 0-2 /HPF 0-2   WBC, UA Latest Ref Range: None Seen /HPF 0-2 (A)   Bacteria, UA Latest Ref Range: None Seen, Trace /HPF None Seen   Squamous Epithelial Cells, UA Latest Ref Range: None Seen, 0-2 /HPF 0-2   Hyaline Casts, UA Latest Ref Range: 0 - 6 /LPF 0-6   Methodology: Unknown Automated Microscopy               MDM    Final diagnoses:   Non-intractable vomiting with nausea, unspecified vomiting type            Bruno Dennis DO  07/09/17 0318

## 2017-07-14 NOTE — PROGRESS NOTES
DATE OF VISIT: 7/14/2017    REASON FOR VISIT: Followup for   1. Breast cancer:   a) Initially presenting as stage IIB disease, T2N1M0, estrogen receptor  strongly positive, progesterone receptor intermediate, HER-2/jean marie negative.   b) Status post right mastectomy followed by adjuvant chemotherapy using  Taxotere and Cytoxan 4 cycles.   c) Took Arimidex 1 mg p.o. daily from 01/10/2013 until 04/28/2015.   2. Locally relapsed disease with right chest wall as well as axillary lymph  nodes biopsy proven metastatic disease done 04/17/2015.  3. Enrolled on clinical trial [employer]  randomizing patients for 2nd line  hormone treatment with Aromasin with placebo versus Aromasin with Entinostat.   4. Currently on Aromasin single agent.   5. Completed adjuvant radiation treatment by Dr. Benavides to the right chest  wall as well as right axillary lymph nodes 12/09/2015.   6. Currently metastatic disease with new liver metastases documented on CAT  scan 04/22/2016.   7. Started Faslodex Ibrance 05/02/2016.   8. Switched to Carbo/gemzar weekly secondary to progressive disease from 07/22/2016 till 10/11/2016.  9. Started gemzar single agent weekly 2 weeks on and 1 week off 10/25/2016.  10.  Started weekly Adriamycin single agent on February 14, 2017   11. Changed to Doxil single agent every 4 weeks on May 30, 2017.     HISTORY OF PRESENT ILLNESS: The patient is a very pleasant 75-year-old female  with past medical history significant for invasive ductal carcinoma of the  right breast diagnosed 06/21/2012. The patient is status post right mastectomy  on 07/13/2012 with lymph node dissection, tumor greatest dimension was 2.5 cm,  2 out of 8 positive axial lymph nodes, clear surgical margins. The patient was  started on adjuvant chemotherapy using Taxotere and Cytoxan on 10/08/2012. The  patient completed her treatment on 12/10/2012. The patient was started on  adjuvant hormone treatment using Arimidex 1 mg p.o. daily 01/10/2013.  The  patient presented with right chest wall mass. Biopsy done on 04/17/2015  revealed relapsed high-grade ductal carcinoma. She had CAT scan of the chest,  abdomen, and pelvis that revealed disease in the right chest wall as well as  right axilla. The patient was enrolled with ECoG  Aromasin with or without  Entinostat on 05/08/2015. The patient had progressive disease documented on  scans done 04/22/2016 with liver metastases. The patient was started on  Faslodex Ibrance on 05/02/2016. Repeat CT scan done 7/22/2016 showed continued progression of disease, and the patient's treatment was changed to carbo/gemzar 2 weeks on, 1 week off. Completed 5 cycles on 10/11/2016. shw started Gemzar single agent 10/25/2016.  Repeated CAT scan done February 3, 2017 showed progressive liver metastases.  Patient was switched to weekly Adriamycin started on February 14, 2017. She had stable disease documented on CT scans done 5/26/2017 and was switched to Doxil given every 4 weeks.  The patient is here today for cycle #3, day 1.      SUBJECTIVE: The patient has been doing fairly well. She was admitted to the hospital on 7/5/2017 for abdominal pain and found to have constipation, hypokalemia, and dehydration. She was discharged on 7/7/2017, and has been feeling well since. She still complains of poor appetite. She would like to try something to help improve her appetite. She notes her taste is impaired with her current chemotherapy. She has had no further nausea or vomiting. She is using Miralax as needed for constipation. She has no new pain or discomfort.      PAST MEDICAL HISTORY/SOCIAL HISTORY/FAMILY HISTORY: Unchanged from my prior documentation done on 10/2/2012.     Review of Systems   Constitutional: Positive for fatigue. Negative for activity change, chills, fever and unexpected weight change.   HENT: Negative for hearing loss, mouth sores, nosebleeds, sore throat and trouble swallowing.    Eyes: Negative for visual  disturbance.   Respiratory: Negative for chest tightness, shortness of breath and wheezing.    Cardiovascular: Negative for chest pain, palpitations and leg swelling.   Gastrointestinal: Negative for abdominal distention, abdominal pain, blood in stool, constipation, diarrhea, nausea, rectal pain and vomiting.        Poor appetite   Endocrine: Negative for cold intolerance and heat intolerance.   Genitourinary: Negative for difficulty urinating, dysuria, frequency and urgency.   Musculoskeletal: Positive for arthralgias. Negative for back pain, gait problem and myalgias.   Skin: Negative for rash.   Neurological: Negative for dizziness, tremors, syncope, weakness, light-headedness, numbness and headaches.   Hematological: Negative for adenopathy. Does not bruise/bleed easily.   Psychiatric/Behavioral: Negative for confusion, sleep disturbance and suicidal ideas. The patient is not nervous/anxious.          Current Outpatient Prescriptions:   •  albuterol (VENTOLIN HFA) 108 (90 BASE) MCG/ACT inhaler, Inhale 2 puffs Every 6 (Six) Hours As Needed for Wheezing or Shortness of Air., Disp: 18 g, Rfl: 5  •  amLODIPine (NORVASC) 10 MG tablet, Take 10 mg by mouth daily., Disp: , Rfl:   •  hydrALAZINE (APRESOLINE) 25 MG tablet, Take 50 mg by mouth Every Morning., Disp: , Rfl:   •  hydrALAZINE (APRESOLINE) 25 MG tablet, Take 25 mg by mouth At Night As Needed., Disp: , Rfl:   •  lidocaine-prilocaine (EMLA) 2.5-2.5 % cream, Apply  topically As Needed for mild pain (1-3) (prior to port access). (Patient taking differently: Apply 1 application topically As Needed for Mild Pain (1-3) (prior to port access).), Disp: 30 g, Rfl: 2  •  Multiple Vitamin (TAB-A-TESS) tablet, Take 1 tablet by mouth daily., Disp: , Rfl: 0  •  polyethylene glycol (MIRALAX) packet, Take 17 g by mouth Daily., Disp: 225 g, Rfl: 1  •  quinapril (ACCUPRIL) 10 MG tablet, Take 10 mg by mouth Every Morning., Disp: , Rfl:     PHYSICAL EXAMINATION:   /63  "Comment: lur  Pulse 100  Temp 97.7 °F (36.5 °C) (Temporal Artery )   Resp 18  Ht 64\" (162.6 cm)  Wt 164 lb (74.4 kg)  BMI 28.15 kg/m2   ECOG Performance Status: 1  General Appearance:  alert, cooperative, no apparent distress and appears stated age   Neurologic/Psychiatric: A&O x 3, gait steady, appropriate affect, strength 5/5 in all muscle groups   HEENT:  Normocephalic, without obvious abnormality, mucous membranes moist   Neck: Supple, symmetrical, trachea midline, no adenopathy;  No thyromegaly, masses, or tenderness   Lungs:   Clear to auscultation bilaterally; respirations regular, even, and unlabored bilaterally   Heart:  Regular rate and rhythm, no murmurs appreciated   Abdomen:   Soft, non-tender, non-distended and no organomegaly   Lymph nodes: No cervical, supraclavicular, inguinal or axillary adenopathy noted   Extremities: Normal, atraumatic; no clubbing, cyanosis, or edema    Skin: No rashes, ulcers, or suspicious lesions noted     Admission on 07/05/2017, Discharged on 07/07/2017   Component Date Value Ref Range Status   • Glucose 07/05/2017 159* 70 - 100 mg/dL Final   • BUN 07/05/2017 13  9 - 23 mg/dL Final   • Creatinine 07/05/2017 0.70  0.60 - 1.30 mg/dL Final   • Sodium 07/05/2017 136  132 - 146 mmol/L Final   • Potassium 07/05/2017 3.5  3.5 - 5.5 mmol/L Final   • Chloride 07/05/2017 100  99 - 109 mmol/L Final   • CO2 07/05/2017 27.0  20.0 - 31.0 mmol/L Final   • Calcium 07/05/2017 9.8  8.7 - 10.4 mg/dL Final   • Total Protein 07/05/2017 6.9  5.7 - 8.2 g/dL Final   • Albumin 07/05/2017 3.70  3.20 - 4.80 g/dL Final   • ALT (SGPT) 07/05/2017 47* 7 - 40 U/L Final   • AST (SGOT) 07/05/2017 120* 0 - 33 U/L Final   • Alkaline Phosphatase 07/05/2017 196* 25 - 100 U/L Final   • Total Bilirubin 07/05/2017 0.4  0.3 - 1.2 mg/dL Final   • eGFR   Amer 07/05/2017 98  >60 mL/min/1.73 Final   • Globulin 07/05/2017 3.2  gm/dL Final   • A/G Ratio 07/05/2017 1.2* 1.5 - 2.5 g/dL Final   • BUN/Creatinine " Ratio 07/05/2017 18.6  7.0 - 25.0 Final   • Anion Gap 07/05/2017 9.0  3.0 - 11.0 mmol/L Final   • Lipase 07/05/2017 28  6 - 51 U/L Final   • Color, UA 07/05/2017 Yellow  Yellow, Straw Final   • Appearance, UA 07/05/2017 Clear  Clear Final   • pH, UA 07/05/2017 6.0  5.0 - 8.0 Final   • Specific Gravity, UA 07/05/2017 1.017  1.001 - 1.030 Final   • Glucose, UA 07/05/2017 Negative  Negative Final   • Ketones, UA 07/05/2017 15 mg/dL (1+)* Negative Final   • Bilirubin, UA 07/05/2017 Negative  Negative Final   • Blood, UA 07/05/2017 Negative  Negative Final   • Protein, UA 07/05/2017 30 mg/dL (1+)* Negative Final   • Leuk Esterase, UA 07/05/2017 Small (1+)* Negative Final   • Nitrite, UA 07/05/2017 Negative  Negative Final   • Urobilinogen, UA 07/05/2017 1.0 E.U./dL  0.2 - 1.0 E.U./dL Final   • Extra Tube 07/05/2017 hold for add-on   Final    Auto resulted   • Extra Tube 07/05/2017 Hold for add-ons.   Final    Auto resulted.   • Extra Tube 07/05/2017 hold for add-on   Final    Auto resulted   • Extra Tube 07/05/2017 Hold for add-ons.   Final    Auto resulted.   • WBC 07/05/2017 3.46* 3.50 - 10.80 10*3/mm3 Final   • RBC 07/05/2017 3.71* 3.89 - 5.14 10*6/mm3 Final   • Hemoglobin 07/05/2017 11.6  11.5 - 15.5 g/dL Final   • Hematocrit 07/05/2017 36.8  34.5 - 44.0 % Final   • MCV 07/05/2017 99.2* 80.0 - 99.0 fL Final   • MCH 07/05/2017 31.3* 27.0 - 31.0 pg Final   • MCHC 07/05/2017 31.5* 32.0 - 36.0 g/dL Final   • RDW 07/05/2017 14.8* 11.3 - 14.5 % Final   • RDW-SD 07/05/2017 53.9  37.0 - 54.0 fl Final   • MPV 07/05/2017 10.4  6.0 - 12.0 fL Final   • Platelets 07/05/2017 291  150 - 450 10*3/mm3 Final   • Neutrophil % 07/05/2017 68.5  41.0 - 71.0 % Final   • Lymphocyte % 07/05/2017 17.6* 24.0 - 44.0 % Final   • Monocyte % 07/05/2017 11.8  0.0 - 12.0 % Final   • Eosinophil % 07/05/2017 0.9  0.0 - 3.0 % Final   • Basophil % 07/05/2017 0.9  0.0 - 1.0 % Final   • Immature Grans % 07/05/2017 0.3  0.0 - 0.6 % Final   • Neutrophils,  Absolute 07/05/2017 2.37  1.50 - 8.30 10*3/mm3 Final   • Lymphocytes, Absolute 07/05/2017 0.61  0.60 - 4.80 10*3/mm3 Final   • Monocytes, Absolute 07/05/2017 0.41  0.00 - 1.00 10*3/mm3 Final   • Eosinophils, Absolute 07/05/2017 0.03  0.00 - 0.30 10*3/mm3 Final   • Basophils, Absolute 07/05/2017 0.03  0.00 - 0.20 10*3/mm3 Final   • Immature Grans, Absolute 07/05/2017 0.01  0.00 - 0.03 10*3/mm3 Final   • Troponin I 07/05/2017 0.03  0.00 - 0.07 ng/mL Final    Serial Number: [id number]    : [id number]   • RBC, UA 07/05/2017 0-2  None Seen, 0-2 /HPF Final   • WBC, UA 07/05/2017 0-2* None Seen /HPF Final   • Bacteria, UA 07/05/2017 None Seen  None Seen, Trace /HPF Final   • Squamous Epithelial Cells, UA 07/05/2017 0-2  None Seen, 0-2 /HPF Final   • Hyaline Casts, UA 07/05/2017 0-6  0 - 6 /LPF Final   • Methodology 07/05/2017 Automated Microscopy   Final   • Urine Culture 07/05/2017 50,000-60,000 CFU/mL    Final    Mixed skin mauircio including     • Urine Culture 07/05/2017 >100,000 CFU/mL Streptococcus agalactiae (Group B)*  Final    This organism is considered to be universally susceptible to penicillin.  No further antibiotic testing will be performed.   • STREP GROUPING 07/05/2017 B   Final   • Lactate 07/05/2017 1.1  0.5 - 2.0 mmol/L Final    Falsely depressed results may occur on samples drawn from patients receiving N-Acetylcysteine (NAC) or Metamizole.   • Blood Culture 07/05/2017 No growth at 5 days   Final   • Troponin I 07/05/2017 0.03  0.00 - 0.07 ng/mL Final    Serial Number: [id number]    : [id number]   • Glucose 07/06/2017 84  70 - 100 mg/dL Final   • BUN 07/06/2017 7* 9 - 23 mg/dL Final   • Creatinine 07/06/2017 0.70  0.60 - 1.30 mg/dL Final   • Sodium 07/06/2017 143  132 - 146 mmol/L Final   • Potassium 07/06/2017 3.9  3.5 - 5.5 mmol/L Final   • Chloride 07/06/2017 108  99 - 109 mmol/L Final   • CO2 07/06/2017 30.0  20.0 - 31.0 mmol/L Final   • Calcium 07/06/2017 9.2  8.7 - 10.4 mg/dL Final   • Total  Protein 07/06/2017 6.3  5.7 - 8.2 g/dL Final   • Albumin 07/06/2017 3.20  3.20 - 4.80 g/dL Final   • ALT (SGPT) 07/06/2017 47* 7 - 40 U/L Final   • AST (SGOT) 07/06/2017 116* 0 - 33 U/L Final   • Alkaline Phosphatase 07/06/2017 178* 25 - 100 U/L Final   • Total Bilirubin 07/06/2017 0.3  0.3 - 1.2 mg/dL Final   • eGFR   Amer 07/06/2017 98  >60 mL/min/1.73 Final   • Globulin 07/06/2017 3.1  gm/dL Final   • A/G Ratio 07/06/2017 1.0* 1.5 - 2.5 g/dL Final   • BUN/Creatinine Ratio 07/06/2017 10.0  7.0 - 25.0 Final   • Anion Gap 07/06/2017 5.0  3.0 - 11.0 mmol/L Final   • WBC 07/06/2017 3.49* 3.50 - 10.80 10*3/mm3 Final   • RBC 07/06/2017 3.51* 3.89 - 5.14 10*6/mm3 Final   • Hemoglobin 07/06/2017 10.9* 11.5 - 15.5 g/dL Final   • Hematocrit 07/06/2017 36.2  34.5 - 44.0 % Final   • MCV 07/06/2017 103.1* 80.0 - 99.0 fL Final   • MCH 07/06/2017 31.1* 27.0 - 31.0 pg Final   • MCHC 07/06/2017 30.1* 32.0 - 36.0 g/dL Final   • RDW 07/06/2017 15.2* 11.3 - 14.5 % Final   • RDW-SD 07/06/2017 57.3* 37.0 - 54.0 fl Final   • MPV 07/06/2017 10.0  6.0 - 12.0 fL Final   • Platelets 07/06/2017 279  150 - 450 10*3/mm3 Final   • Lactate 07/06/2017 0.6  0.5 - 2.0 mmol/L Final    Falsely depressed results may occur on samples drawn from patients receiving N-Acetylcysteine (NAC) or Metamizole.        Xr Chest 1 View    Result Date: 7/5/2017  Narrative: EXAM:   XR Chest, 1 View CLINICAL HISTORY:   77 years old, female; Pain; Other: Upper abdominal pain; Prior surgery; Surgery date: 6+ months; Surgery type: Right mastectomy and port placement; Additional info: Upper abdominal pain triage protocol TECHNIQUE:   Frontal view of the chest. COMPARISON:   CT CHEST W CONTRAST 5/26/2017 2:42:47 PM FINDINGS: Left midthoracic port with attached left subclavian central venous line, tip overlying the lower superior vena cava. Heart size within normal limits. No airspace consolidation identified. No visible pneumothorax or pleural effusion. Mild coarse  interstitial opacities throughout both lungs, consistent with chronic scarring and/or pulmonary vascular congestion, increased from prior study.     Impression: 1. Mild coarse interstitial opacities throughout both lungs, consistent with chronic scarring and/or pulmonary vascular congestion, increased from prior study. THIS DOCUMENT HAS BEEN ELECTRONICALLY SIGNED BY JUNIOR LEVINE MD      ASSESSMENT: The patient is a very pleasant 75-year-old female with right breast  cancer.     PROBLEM LIST:  1. T2N1M0 invasive ductal carcinoma of the right breast, tumor greatest  dimension 2.5 cm, estrogen receptor strongly positive, progesterone receptor  intermediate, HER-2/jean marie negative by IHC score of 0, 2 out of 8 positive  axillary lymph nodes.   2. Status post right mastectomy with axillary lymph node dissection done by  Dr. Heart on 07/30/2012.   3. Status post 4 cycles of adjuvant chemotherapy using Taxotere and Cytoxan  from 10/08/2012 until 12/10/2012.   4. Took Arimidex 1 mg p.o. daily from 01/10/2013 until 04/28/2015.   5. Relapsed disease documented on CAT scan of the chest, abdomen, and pelvis  done 04/27/2015. Revealed right chest wall mass as well as right axillary  lymphadenopathy. Biopsy done 04/17/2015 revealed invasive ductal carcinoma.   6. Enrolled on ECoG  protocol, Aromasin with placebo versus Aromasin and  Entinostat, 05/08/2015.   7. Stopped Aromasin 04/25/2016 secondary to new liver metastases documented on  CAT scan 04/22/2016.   8. Completed adjuvant radiation treatment to the right chest wall 12/09/2015.  9. Status post right axillary dissection done by Dr. Heart 03/14/2016.   10. Progressive disease documented on CAT scan done 04/22/2016 with new liver  metastases.   11. Started Faslodex Ibrance 05/02/2016.  12. Neutropenia secondary Ibrance. The patient is on 100 mg, 3 weeks on and 1  week off.   13. Rheumatoid arthritis.   14. Worsening metastatic disease documented on CT scans done on  07/25/2016.  15. Started on carboplatin AUC 2 with gemzar given two weeks on, one week off. She is status post three cycle of treatment.   16. Mixed response to treatment documented on CAT scans done 11/15/2016.  17. On gemzar single agent, status post 6 cycles.  18.  Progressive disease documented on CAT scan done February 4, 2017.  19.  Started weekly Adriamycin February 14, 2017.   20. Changed to maintenance Doxil after maximum response from weekly Adriamycin on 5/30/2017.   21.  Chemotherapy-induced neutropenia    PLAN:  1. We will change the patient's treatment  to Doxil given IV every 4 weeks for maintenance treatment.   2. She will continue use of EMLA cream prior to port access.  3. The patient will continue to hold her methotrexate as she is on active cancer treatment.  4. We will continue to monitor the patient's blood counts, kidney function, and liver function throughout treatment.   5. We will continue Norco 5/325 mg 1 tablet taken every 6 hours as needed for cancer-related pain. She has not been needing this medication recently, but has it to be used if needed.   6.  She will have repeat CAT scans done in 2 months which will be due 07/26/2017.   7.  We reviewed the side effects of this regimen including nausea, fatigue, myelosuppression, infusion reaction, cardiotoxicity, myelodysplasia, alopecia, and constipation or diarrhea.  8. She will need repeat echocardiogram 8/9/2017. This will be due prior to return.     Eliza Heart, APRN  7/14/2017

## 2017-08-14 NOTE — PROGRESS NOTES
DATE OF VISIT: 8/14/2017    REASON FOR VISIT: Followup for   1. Breast cancer:   a) Initially presenting as stage IIB disease, T2N1M0, estrogen receptor  strongly positive, progesterone receptor intermediate, HER-2/jean marie negative.   b) Status post right mastectomy followed by adjuvant chemotherapy using  Taxotere and Cytoxan 4 cycles.   c) Took Arimidex 1 mg p.o. daily from 01/10/2013 until 04/28/2015.   2. Locally relapsed disease with right chest wall as well as axillary lymph  nodes biopsy proven metastatic disease done 04/17/2015.  3. Enrolled on clinical trial [employer]  randomizing patients for 2nd line  hormone treatment with Aromasin with placebo versus Aromasin with Entinostat.   4. Currently on Aromasin single agent.   5. Completed adjuvant radiation treatment by Dr. Benavides to the right chest  wall as well as right axillary lymph nodes 12/09/2015.   6. Currently metastatic disease with new liver metastases documented on CAT  scan 04/22/2016.   7. Started Faslodex Ibrance 05/02/2016.   8. Switched to Carbo/gemzar weekly secondary to progressive disease from 07/22/2016 till 10/11/2016.  9. Started gemzar single agent weekly 2 weeks on and 1 week off 10/25/2016.  10.  Started weekly Adriamycin single agent on February 14, 2017   11. Changed to Doxil single agent every 4 weeks on May 30, 2017.   12.  Progressive disease documented CAT scan done August 9, 2017    HISTORY OF PRESENT ILLNESS: The patient is a very pleasant 75-year-old female  with past medical history significant for invasive ductal carcinoma of the  right breast diagnosed 06/21/2012. The patient is status post right mastectomy  on 07/13/2012 with lymph node dissection, tumor greatest dimension was 2.5 cm,  2 out of 8 positive axial lymph nodes, clear surgical margins. The patient was  started on adjuvant chemotherapy using Taxotere and Cytoxan on 10/08/2012. The  patient completed her treatment on 12/10/2012. The patient was started on  adjuvant  hormone treatment using Arimidex 1 mg p.o. daily 01/10/2013. The  patient presented with right chest wall mass. Biopsy done on 04/17/2015  revealed relapsed high-grade ductal carcinoma. She had CAT scan of the chest,  abdomen, and pelvis that revealed disease in the right chest wall as well as  right axilla. The patient was enrolled with [employer]Co[employer]  Aromasin with or without  Entinostat on 05/08/2015. The patient had progressive disease documented on  scans done 04/22/2016 with liver metastases. The patient was started on  Faslodex Ibrance on 05/02/2016. Repeat CT scan done 7/22/2016 showed continued progression of disease, and the patient's treatment was changed to carbo/gemzar 2 weeks on, 1 week off. Completed 5 cycles on 10/11/2016. shw started Gemzar single agent 10/25/2016.  Repeated CAT scan done February 3, 2017 showed progressive liver metastases.  Patient was switched to weekly Adriamycin started on February 14, 2017. She had stable disease documented on CT scans done 5/26/2017 and was switched to Doxil given every 4 weeks.  The patient is here today for cycle #4, day 1.      SUBJECTIVE: The patient has been doing fairly well.  Her appetite had improved and she gained couple of pounds over the last month.  She has had no further nausea or vomiting. She is using Miralax as needed for constipation. She has no new pain or discomfort.      PAST MEDICAL HISTORY/SOCIAL HISTORY/FAMILY HISTORY: Unchanged from my prior documentation done on 10/2/2012.     Review of Systems   Constitutional: Positive for fatigue. Negative for activity change, chills, fever and unexpected weight change.   HENT: Negative for hearing loss, mouth sores, nosebleeds, sore throat and trouble swallowing.    Eyes: Negative for visual disturbance.   Respiratory: Negative for chest tightness, shortness of breath and wheezing.    Cardiovascular: Negative for chest pain, palpitations and leg swelling.   Gastrointestinal: Negative for abdominal  distention, abdominal pain, blood in stool, constipation, diarrhea, nausea, rectal pain and vomiting.        Poor appetite   Endocrine: Negative for cold intolerance and heat intolerance.   Genitourinary: Negative for difficulty urinating, dysuria, frequency and urgency.   Musculoskeletal: Positive for arthralgias. Negative for back pain, gait problem and myalgias.   Skin: Negative for rash.   Neurological: Negative for dizziness, tremors, syncope, weakness, light-headedness, numbness and headaches.   Hematological: Negative for adenopathy. Does not bruise/bleed easily.   Psychiatric/Behavioral: Negative for confusion, sleep disturbance and suicidal ideas. The patient is not nervous/anxious.          Current Outpatient Prescriptions:   •  albuterol (VENTOLIN HFA) 108 (90 BASE) MCG/ACT inhaler, Inhale 2 puffs Every 6 (Six) Hours As Needed for Wheezing or Shortness of Air., Disp: 18 g, Rfl: 5  •  amLODIPine (NORVASC) 10 MG tablet, Take 10 mg by mouth daily., Disp: , Rfl:   •  lidocaine-prilocaine (EMLA) 2.5-2.5 % cream, Apply  topically As Needed for mild pain (1-3) (prior to port access). (Patient taking differently: Apply 1 application topically As Needed for Mild Pain (1-3) (prior to port access).), Disp: 30 g, Rfl: 2  •  megestrol (MEGACE) 40 MG/ML suspension, Take 20 mL by mouth Daily., Disp: 480 mL, Rfl: 5  •  Multiple Vitamin (TAB-A-TESS) tablet, Take 1 tablet by mouth daily., Disp: , Rfl: 0  •  polyethylene glycol (MIRALAX) packet, Take 17 g by mouth Daily., Disp: 225 g, Rfl: 1  •  quinapril (ACCUPRIL) 10 MG tablet, Take 10 mg by mouth Every Morning., Disp: , Rfl:     PHYSICAL EXAMINATION:   /70  Pulse 88  Temp 98.3 °F (36.8 °C) (Temporal Artery )   Resp 19  Wt 165 lb (74.8 kg)  BMI 28.32 kg/m2   ECOG Performance Status: 1  General Appearance:  alert, cooperative, no apparent distress and appears stated age   Neurologic/Psychiatric: A&O x 3, gait steady, appropriate affect, strength 5/5 in all  muscle groups   HEENT:  Normocephalic, without obvious abnormality, mucous membranes moist   Neck: Supple, symmetrical, trachea midline, no adenopathy;  No thyromegaly, masses, or tenderness   Lungs:   Clear to auscultation bilaterally; respirations regular, even, and unlabored bilaterally   Heart:  Regular rate and rhythm, no murmurs appreciated   Abdomen:   Soft, non-tender, non-distended and no organomegaly   Lymph nodes: No cervical, supraclavicular, inguinal or axillary adenopathy noted   Extremities: Normal, atraumatic; no clubbing, cyanosis, or edema    Skin: No rashes, ulcers, or suspicious lesions noted     Hospital Outpatient Visit on 08/09/2017   Component Date Value Ref Range Status   • BSA 08/09/2017 1.8  m^2 Preliminary   • IVSd 08/09/2017 1.1  cm Preliminary   • LVIDd 08/09/2017 4.3  cm Preliminary   • LVIDs 08/09/2017 2.9  cm Preliminary   • LVPWd 08/09/2017 1.1  cm Preliminary   • IVS/LVPW 08/09/2017 0.96   Preliminary   • FS 08/09/2017 33.4  % Preliminary   • EDV(Teich) 08/09/2017 82.2  ml Preliminary   • ESV(Teich) 08/09/2017 30.9  ml Preliminary   • EF(Teich) 08/09/2017 62.4  % Preliminary   • EDV(cubed) 08/09/2017 78.4  ml Preliminary   • ESV(cubed) 08/09/2017 23.1  ml Preliminary   • EF(cubed) 08/09/2017 70.5  % Preliminary   • LV mass(C)d 08/09/2017 155.5  grams Preliminary   • LV mass(C)dI 08/09/2017 86.5  grams/m^2 Preliminary   • SV(Teich) 08/09/2017 51.3  ml Preliminary   • SI(Teich) 08/09/2017 28.5  ml/m^2 Preliminary   • SV(cubed) 08/09/2017 55.3  ml Preliminary   • SI(cubed) 08/09/2017 30.7  ml/m^2 Preliminary   • Ao root diam 08/09/2017 2.4  cm Preliminary   • Ao root area 08/09/2017 4.5  cm^2 Preliminary   • LA dimension 08/09/2017 3.1  cm Preliminary   • LA/Ao 08/09/2017 1.3   Preliminary   • Ao root area (BSA corrected) 08/09/2017 1.3   Preliminary   • MV E max butch 08/09/2017 48.9  cm/sec Preliminary   • MV A max butch 08/09/2017 93.3  cm/sec Preliminary   • MV E/A 08/09/2017 0.52    Preliminary   • MV P1/2t max butch 08/09/2017 62.4  cm/sec Preliminary   • MV P1/2t 08/09/2017 78.4  msec Preliminary   • MVA(P1/2t) 08/09/2017 2.8  cm^2 Preliminary   • MV dec slope 08/09/2017 233.0  cm/sec^2 Preliminary   • MV dec time 08/09/2017 0.23  sec Preliminary   • PA acc slope 08/09/2017 585.0  cm/sec^2 Preliminary   • PA acc time 08/09/2017 0.1  sec Preliminary   • RV V1 max PG 08/09/2017 1.0  mmHg Preliminary   • RV V1 max 08/09/2017 50.3  cm/sec Preliminary   • TR max butch 08/09/2017 351  cm/sec Preliminary   • RVSP(TR) 08/09/2017 52  mmHg Preliminary   • RAP systole 08/09/2017 3.0  mmHg Preliminary   • PA pr(Accel) 08/09/2017 34.0  mmHg Preliminary   • MVA P1/2T LCG 08/09/2017 3.5  cm^2 Preliminary   • BH CV ECHO MILAGRO - BZI_BMI 08/09/2017 28.2  kilograms/m^2 Preliminary   • BH CV ECHO [place]RO - BSA([place]) 08/09/2017 1.9  m^2 Preliminary   • BH CV ECHO MILAGRO - BZI_METRIC_WEIGHT 08/09/2017 74.4  kg Preliminary   • BH CV ECHO MIL[place]RO - BZI_METRIC_HEIGHT 08/09/2017 162.6  cm Preliminary   • TDI S' 08/09/2017 10.40  cm/sec Final   • RV Base 08/09/2017 3.50  cm Final   • RV Length 08/09/2017 6.50  cm Final   • RV Mid 08/09/2017 2.50  cm Final   • LA volume 08/09/2017 23.0  cm3 Final   • LA Volume Index 08/09/2017 12.8  mL/m2 Final   • TAPSE (>1.6) 08/09/2017 2.00  cm2 Final        Ct Chest With Contrast    Result Date: 8/10/2017  Narrative: EXAMINATION: CT CHEST W CONTRAST, CT ABDOMEN AND PELVIS W CONTRAST-08/09/2017:  INDICATION: F/U scan; C50.111-Malignant neoplasm of central portion of right female breast.     TECHNIQUE: Spiral acquisition post-IV contrast 5 mm images through the chest, and 5 mm post-IV contrast portal venous phase and delayed venous phase images through the abdomen and pelvis.  The radiation dose reduction device was turned on for each scan per the ALARA (As Low as Reasonably Achievable) protocol.  COMPARISON: 05/26/2017 chest, abdomen and pelvis CT scans and earlier scans.  FINDINGS: The  patient history indicates right breast carcinoma with liver mets, followup. Previous exam report from 05/26/2017 indicates stable low-density liver lesions.  CHEST CT SCAN WITH IV CONTRAST:  A cluster of low density possibly necrotic/treated left axillary lymph nodes appears unchanged. No new axillary adenopathy is seen on the right or left. No significant mediastinal adenopathy is seen. There is a subtle suggestion of a 1 cm inferior left hilar node, previously normal in size on both the prior study and the earlier study.  Lung window images show a previous linear plaque-like density in the medial left upper lobe now to appear masslike, pleural based, and slightly spiculated margins, 2.5 cm in diameter. There is increased interstitial disease extending from this lesion back to the left hilum. The appearances is rather intermediate between dense focal pneumonia and mass, with some features of both. There is no evidence of any new pulmonary parenchymal disease elsewhere and no pleural effusion. The bony structures appear grossly intact.      Impression: 1. Stable mildly enlarged necrotic appearing left axillary lymph nodes. 2. Previous area of linear scarring in the left upper lobe now appears masslike, 2.5 cm in diameter, with characteristics intermediate between neoplasm and dense focal pneumonia. New mildly enlarged left hilar lymph node presumably as a result. Please correlate with the patient's symptoms. 3. No new chest disease is seen elsewhere.  ABDOMEN AND PELVIS CT SCAN WITH IV CONTRAST:  Compared to the prior study, the patient's scattered liver lesions appear larger although less dense. As an example, the segment 4A lesion has increased from 23 to 33 mm. The dominant segment 6 lesion has increased from 6.2 to 7.2 cm, and the dorsal, segment 7 lesion has increased from 38 mm to 40 mm. Scattered small new low-density lesions are present as well.  There is a subtle suggestion of necrotic adenopathy anterior  to the IVC, similar in density to the liver lesions, increased from approximately 23 to approximately 29 mm. No new node or mass is apparent elsewhere. No adrenal lesions are seen. There are multiple renal cysts. The spleen and pancreas appear normal. No ascites or omental disease is appreciated. Bowel loops are normal in caliber.  Regarding the lower abdomen and pelvis, the uterus and ovaries are not enlarged. No mass, adenopathy, ascites, or acute inflammatory focus is seen. The bony structures appear intact.  IMPRESSION: 1. Apparent interval progression of patient's extensive liver metastatic disease, including either an exophytic liver mass or necrotic node anterior to the IVC. 2. No evidence of metastatic disease or other new disease is seen elsewhere in the abdomen or pelvis.  D:  08/10/2017 E:  08/10/2017    This report was finalized on 8/10/2017 10:12 PM by DR. Cyrus Milan MD.      Ct Abdomen Pelvis With Contrast    Result Date: 8/10/2017  Narrative: EXAMINATION: CT CHEST W CONTRAST, CT ABDOMEN AND PELVIS W CONTRAST-08/09/2017:  INDICATION: F/U scan; C50.111-Malignant neoplasm of central portion of right female breast.     TECHNIQUE: Spiral acquisition post-IV contrast 5 mm images through the chest, and 5 mm post-IV contrast portal venous phase and delayed venous phase images through the abdomen and pelvis.  The radiation dose reduction device was turned on for each scan per the ALARA (As Low as Reasonably Achievable) protocol.  COMPARISON: 05/26/2017 chest, abdomen and pelvis CT scans and earlier scans.  FINDINGS: The patient history indicates right breast carcinoma with liver mets, followup. Previous exam report from 05/26/2017 indicates stable low-density liver lesions.  CHEST CT SCAN WITH IV CONTRAST:  A cluster of low density possibly necrotic/treated left axillary lymph nodes appears unchanged. No new axillary adenopathy is seen on the right or left. No significant mediastinal adenopathy is seen. There  is a subtle suggestion of a 1 cm inferior left hilar node, previously normal in size on both the prior study and the earlier study.  Lung window images show a previous linear plaque-like density in the medial left upper lobe now to appear masslike, pleural based, and slightly spiculated margins, 2.5 cm in diameter. There is increased interstitial disease extending from this lesion back to the left hilum. The appearances is rather intermediate between dense focal pneumonia and mass, with some features of both. There is no evidence of any new pulmonary parenchymal disease elsewhere and no pleural effusion. The bony structures appear grossly intact.      Impression: 1. Stable mildly enlarged necrotic appearing left axillary lymph nodes. 2. Previous area of linear scarring in the left upper lobe now appears masslike, 2.5 cm in diameter, with characteristics intermediate between neoplasm and dense focal pneumonia. New mildly enlarged left hilar lymph node presumably as a result. Please correlate with the patient's symptoms. 3. No new chest disease is seen elsewhere.  ABDOMEN AND PELVIS CT SCAN WITH IV CONTRAST:  Compared to the prior study, the patient's scattered liver lesions appear larger although less dense. As an example, the segment 4A lesion has increased from 23 to 33 mm. The dominant segment 6 lesion has increased from 6.2 to 7.2 cm, and the dorsal, segment 7 lesion has increased from 38 mm to 40 mm. Scattered small new low-density lesions are present as well.  There is a subtle suggestion of necrotic adenopathy anterior to the IVC, similar in density to the liver lesions, increased from approximately 23 to approximately 29 mm. No new node or mass is apparent elsewhere. No adrenal lesions are seen. There are multiple renal cysts. The spleen and pancreas appear normal. No ascites or omental disease is appreciated. Bowel loops are normal in caliber.  Regarding the lower abdomen and pelvis, the uterus and ovaries  are not enlarged. No mass, adenopathy, ascites, or acute inflammatory focus is seen. The bony structures appear intact.  IMPRESSION: 1. Apparent interval progression of patient's extensive liver metastatic disease, including either an exophytic liver mass or necrotic node anterior to the IVC. 2. No evidence of metastatic disease or other new disease is seen elsewhere in the abdomen or pelvis.  D:  08/10/2017 E:  08/10/2017    This report was finalized on 8/10/2017 10:12 PM by DR. Cyrus Milan MD.        ASSESSMENT: The patient is a very pleasant 75-year-old female with right breast  cancer.     PROBLEM LIST:  1. T2N1M0 invasive ductal carcinoma of the right breast, tumor greatest  dimension 2.5 cm, estrogen receptor strongly positive, progesterone receptor  intermediate, HER-2/jean marie negative by IHC score of 0, 2 out of 8 positive  axillary lymph nodes.   2. Status post right mastectomy with axillary lymph node dissection done by  Dr. Heart on 07/30/2012.   3. Status post 4 cycles of adjuvant chemotherapy using Taxotere and Cytoxan  from 10/08/2012 until 12/10/2012.   4. Took Arimidex 1 mg p.o. daily from 01/10/2013 until 04/28/2015.   5. Relapsed disease documented on CAT scan of the chest, abdomen, and pelvis  done 04/27/2015. Revealed right chest wall mass as well as right axillary  lymphadenopathy. Biopsy done 04/17/2015 revealed invasive ductal carcinoma.   6. Enrolled on [employer]G  protocol, Aromasin with placebo versus Aromasin and  Entinostat, 05/08/2015.   7. Stopped Aromasin 04/25/2016 secondary to new liver metastases documented on  CAT scan 04/22/2016.   8. Completed adjuvant radiation treatment to the right chest wall 12/09/2015.  9. Status post right axillary dissection done by Dr. Heart 03/14/2016.   10. Progressive disease documented on CAT scan done 04/22/2016 with new liver  metastases.   11. Started Faslodex Ibrance 05/02/2016.  12. Neutropenia secondary Ibrance. The patient is on 100 mg, 3 weeks on and  1  week off.   13. Rheumatoid arthritis.   14. Worsening metastatic disease documented on CT scans done on 07/25/2016.  15. Started on carboplatin AUC 2 with gemzar given two weeks on, one week off. She is status post three cycle of treatment.   16. Mixed response to treatment documented on CAT scans done 11/15/2016.  17. On gemzar single agent, status post 6 cycles.  18.  Progressive disease documented on CAT scan done February 4, 2017.  19.  Started weekly Adriamycin February 14, 2017.   20. Changed to maintenance Doxil after maximum response from weekly Adriamycin on 5/30/2017.   21.  Chemotherapy-induced neutropenia    PLAN:  1. We will change the patient's treatment  to Hal[place] 2 weeks on and 1 week off.   2. She will continue use of EMLA cream prior to port access.  3. The patient will continue to hold her methotrexate as she is on active cancer treatment.  4. We will continue to monitor the patient's blood counts, kidney function, and liver function throughout treatment.   5. We will continue Norco 5/325 mg 1 tablet taken every 6 hours as needed for cancer-related pain.  I will go and refill it today.  She has not been needing this medication recently, but has it to be used if needed.   6.  She will have repeat CAT scans prior to cycle #3.  7.  We reviewed the side effects of this regimen including nausea, fatigue, myelosuppression, infusion reaction, peripheral neuropathy, hepatotoxicity, myelodysplasia, alopecia, constipation or diarrhea, and potential death.  8. She will be started on treatment next week.  9.  The patient follow up with me in 2 weeks.    France Chun M.D.    8/14/2017

## 2017-08-28 NOTE — PROGRESS NOTES
Oncology Nutrition    Patient Name:  Nava Cline  YOB: 1940  MRN: [id number]  Admit Date:  (Not on file)    Diagnosis:  Metastatic breast cancer  Treatment:  Halaven - 2 week on, 1 week off    Weight:  165# - stable x 6 weeks (patient with h/o weight loss of ~7# x 5 weeks (4%))    Nutrition follow up with patient during her chemotherapy infusion appointment.  Patient continues to complain of decreased/varied appetite/oral intake, but states she is doing well overall.  She denies all other nutritional complaints at this time.    Briefly reviewed the importance of good nutrition during her treatment course.  Encouraged her to be eating well.  Answered her questions and she voiced understanding of information discussed.  Also encouraged her to call RD if nutritional questions arise.  Will follow as indicated.  RD available to assist prn.    Electronically signed by:  Gilda Dwyer RD  08/28/17 2:17 PM

## 2017-08-28 NOTE — PROGRESS NOTES
DATE OF VISIT: 8/28/2017    REASON FOR VISIT: Followup for   1. Breast cancer:   a) Initially presenting as stage IIB disease, T2N1M0, estrogen receptor  strongly positive, progesterone receptor intermediate, HER-2/jean marie negative.   b) Status post right mastectomy followed by adjuvant chemotherapy using  Taxotere and Cytoxan 4 cycles.   c) Took Arimidex 1 mg p.o. daily from 01/10/2013 until 04/28/2015.   2. Locally relapsed disease with right chest wall as well as axillary lymph  nodes biopsy proven metastatic disease done 04/17/2015.  3. Enrolled on clinical trial [employer]  randomizing patients for 2nd line  hormone treatment with Aromasin with placebo versus Aromasin with Entinostat.   4. Currently on Aromasin single agent.   5. Completed adjuvant radiation treatment by Dr. Benavides to the right chest  wall as well as right axillary lymph nodes 12/09/2015.   6. Currently metastatic disease with new liver metastases documented on CAT  scan 04/22/2016.   7. Started Faslodex Ibrance 05/02/2016.   8. Switched to Carbo/gemzar weekly secondary to progressive disease from 07/22/2016 till 10/11/2016.  9. Started gemzar single agent weekly 2 weeks on and 1 week off 10/25/2016.  10.  Started weekly Adriamycin single agent on February 14, 2017   11. Changed to Doxil single agent every 4 weeks on May 30, 2017.   12.  Progressive disease documented CAT scan done August 9, 2017  13.  Started Halaven in August 21, 2017    HISTORY OF PRESENT ILLNESS: The patient is a very pleasant 75-year-old female  with past medical history significant for invasive ductal carcinoma of the  right breast diagnosed 06/21/2012. The patient is status post right mastectomy  on 07/13/2012 with lymph node dissection, tumor greatest dimension was 2.5 cm,  2 out of 8 positive axial lymph nodes, clear surgical margins. The patient was  started on adjuvant chemotherapy using Taxotere and Cytoxan on 10/08/2012. The  patient completed her treatment on  12/10/2012. The patient was started on  adjuvant hormone treatment using Arimidex 1 mg p.o. daily 01/10/2013. The  patient presented with right chest wall mass. Biopsy done on 04/17/2015  revealed relapsed high-grade ductal carcinoma. She had CAT scan of the chest,  abdomen, and pelvis that revealed disease in the right chest wall as well as  right axilla. The patient was enrolled with [employer]  Aromasin with or without  Entinostat on 05/08/2015. The patient had progressive disease documented on  scans done 04/22/2016 with liver metastases. The patient was started on  Faslodex Ibrance on 05/02/2016. Repeat CT scan done 7/22/2016 showed continued progression of disease, and the patient's treatment was changed to carbo/gemzar 2 weeks on, 1 week off. Completed 5 cycles on 10/11/2016. shw started Gemzar single agent 10/25/2016.  Repeated CAT scan done February 3, 2017 showed progressive liver metastases.  Patient was switched to weekly Adriamycin started on February 14, 2017. She had stable disease documented on CT scans done 5/26/2017 and was switched to Doxil given every 4 weeks.  Repeated scan done August 9, 2017 revealed progressive disease.  Patient was switched to Halaven August 21, 2017.  She is here today for cycle 1 day 8.      SUBJECTIVE: The patient has been doing fairly well.  Her appetite had improved and she gained couple of pounds over the last month.  She has had no further nausea or vomiting. She is using Miralax as needed for constipation. She has no new pain or discomfort.      PAST MEDICAL HISTORY/SOCIAL HISTORY/FAMILY HISTORY: Unchanged from my prior documentation done on 10/2/2012.     Review of Systems   Constitutional: Positive for fatigue. Negative for activity change, chills, fever and unexpected weight change.   HENT: Negative for hearing loss, mouth sores, nosebleeds, sore throat and trouble swallowing.    Eyes: Negative for visual disturbance.   Respiratory: Negative for chest tightness,  shortness of breath and wheezing.    Cardiovascular: Negative for chest pain, palpitations and leg swelling.   Gastrointestinal: Negative for abdominal distention, abdominal pain, blood in stool, constipation, diarrhea, nausea, rectal pain and vomiting.        Poor appetite   Endocrine: Negative for cold intolerance and heat intolerance.   Genitourinary: Negative for difficulty urinating, dysuria, frequency and urgency.   Musculoskeletal: Positive for arthralgias. Negative for back pain, gait problem and myalgias.   Skin: Negative for rash.   Neurological: Negative for dizziness, tremors, syncope, weakness, light-headedness, numbness and headaches.   Hematological: Negative for adenopathy. Does not bruise/bleed easily.   Psychiatric/Behavioral: Negative for confusion, sleep disturbance and suicidal ideas. The patient is not nervous/anxious.          Current Outpatient Prescriptions:   •  albuterol (VENTOLIN HFA) 108 (90 BASE) MCG/ACT inhaler, Inhale 2 puffs Every 6 (Six) Hours As Needed for Wheezing or Shortness of Air., Disp: 18 g, Rfl: 5  •  amLODIPine (NORVASC) 10 MG tablet, Take 10 mg by mouth daily., Disp: , Rfl:   •  HYDROcodone-acetaminophen (NORCO) 5-325 MG per tablet, Take 1 tablet by mouth Every 6 (Six) Hours As Needed for Moderate Pain (4-6). 1-2 tab Q4H PRN pain, Disp: 120 tablet, Rfl: 0  •  lidocaine-prilocaine (EMLA) 2.5-2.5 % cream, Apply  topically As Needed for mild pain (1-3) (prior to port access). (Patient taking differently: Apply 1 application topically As Needed for Mild Pain (1-3) (prior to port access).), Disp: 30 g, Rfl: 2  •  megestrol (MEGACE) 40 MG/ML suspension, Take 20 mL by mouth Daily., Disp: 480 mL, Rfl: 5  •  Multiple Vitamin (TAB-A-TESS) tablet, Take 1 tablet by mouth daily., Disp: , Rfl: 0  •  ondansetron (ZOFRAN) 8 MG tablet, Take 1 tablet by mouth 3 (Three) Times a Day As Needed for Nausea or Vomiting., Disp: 30 tablet, Rfl: 5  •  polyethylene glycol (MIRALAX) packet, Take 17 g  "by mouth Daily., Disp: 225 g, Rfl: 1  •  quinapril (ACCUPRIL) 10 MG tablet, Take 10 mg by mouth Every Morning., Disp: , Rfl:     PHYSICAL EXAMINATION:   /71 Comment: LUE  Pulse 105  Temp 97.6 °F (36.4 °C) (Temporal Artery )   Resp 18  Ht 64\" (162.6 cm)  Wt 165 lb (74.8 kg)  BMI 28.32 kg/m2   ECOG Performance Status: 1  General Appearance:  alert, cooperative, no apparent distress and appears stated age   Neurologic/Psychiatric: A&O x 3, gait steady, appropriate affect, strength 5/5 in all muscle groups   HEENT:  Normocephalic, without obvious abnormality, mucous membranes moist   Neck: Supple, symmetrical, trachea midline, no adenopathy;  No thyromegaly, masses, or tenderness   Lungs:   Clear to auscultation bilaterally; respirations regular, even, and unlabored bilaterally   Heart:  Regular rate and rhythm, no murmurs appreciated   Abdomen:   Soft, non-tender, non-distended and no organomegaly   Lymph nodes: No cervical, supraclavicular, inguinal or axillary adenopathy noted   Extremities: Normal, atraumatic; no clubbing, cyanosis, or edema    Skin: No rashes, ulcers, or suspicious lesions noted     Infusion on 08/21/2017   Component Date Value Ref Range Status   • Glucose 08/21/2017 76  70 - 100 mg/dL Final   • BUN 08/21/2017 15  9 - 23 mg/dL Final   • Creatinine 08/21/2017 0.80  0.60 - 1.30 mg/dL Final   • Sodium 08/21/2017 143  132 - 146 mmol/L Final   • Potassium 08/21/2017 3.4* 3.5 - 5.5 mmol/L Final   • Chloride 08/21/2017 110* 99 - 109 mmol/L Final   • CO2 08/21/2017 30.0  20.0 - 31.0 mmol/L Final   • Calcium 08/21/2017 8.9  8.7 - 10.4 mg/dL Final   • Total Protein 08/21/2017 7.0  5.7 - 8.2 g/dL Final   • Albumin 08/21/2017 3.40  3.20 - 4.80 g/dL Final   • ALT (SGPT) 08/21/2017 37  7 - 40 U/L Final   • AST (SGOT) 08/21/2017 104* 0 - 33 U/L Final   • Alkaline Phosphatase 08/21/2017 208* 25 - 100 U/L Final   • Total Bilirubin 08/21/2017 0.3  0.3 - 1.2 mg/dL Final   • eGFR   Amer 08/21/2017 " 84  >60 mL/min/1.73 Final   • Globulin 08/21/2017 3.6  gm/dL Final   • A/G Ratio 08/21/2017 0.9* 1.5 - 2.5 g/dL Final   • BUN/Creatinine Ratio 08/21/2017 18.8  7.0 - 25.0 Final   • Anion Gap 08/21/2017 3.0  3.0 - 11.0 mmol/L Final   • Magnesium 08/21/2017 1.6  1.3 - 2.7 mg/dL Final   • WBC 08/21/2017 3.40* 3.50 - 10.80 10*3/mm3 Final   • RBC 08/21/2017 3.92  3.89 - 5.14 10*6/mm3 Final   • Hemoglobin 08/21/2017 12.0  11.5 - 15.5 g/dL Final   • Hematocrit 08/21/2017 37.4  34.5 - 44.0 % Final   • RDW 08/21/2017 16.6* 11.3 - 14.5 % Final   • MCV 08/21/2017 95.5  80.0 - 99.0 fL Final   • MCH 08/21/2017 30.7  27.0 - 31.0 pg Final   • MCHC 08/21/2017 32.2  32.0 - 36.0 g/dL Final   • MPV 08/21/2017 7.2  6.0 - 12.0 fL Final   • Platelets 08/21/2017 220  150 - 450 10*3/mm3 Final   • Neutrophil % 08/21/2017 47.8  41.0 - 71.0 % Final   • Lymphocyte % 08/21/2017 42.1  24.0 - 44.0 % Final   • Monocyte % 08/21/2017 10.1  0.0 - 12.0 % Final   • Neutrophils, Absolute 08/21/2017 1.60  1.50 - 8.30 10*3/mm3 Final   • Lymphocytes, Absolute 08/21/2017 1.40  0.60 - 4.80 10*3/mm3 Final   • Monocytes, Absolute 08/21/2017 0.30  0.00 - 1.00 10*3/mm3 Final        Ct Chest With Contrast    Result Date: 8/10/2017  Narrative: EXAMINATION: CT CHEST W CONTRAST, CT ABDOMEN AND PELVIS W CONTRAST-08/09/2017:  INDICATION: F/U scan; C50.111-Malignant neoplasm of central portion of right female breast.     TECHNIQUE: Spiral acquisition post-IV contrast 5 mm images through the chest, and 5 mm post-IV contrast portal venous phase and delayed venous phase images through the abdomen and pelvis.  The radiation dose reduction device was turned on for each scan per the ALARA (As Low as Reasonably Achievable) protocol.  COMPARISON: 05/26/2017 chest, abdomen and pelvis CT scans and earlier scans.  FINDINGS: The patient history indicates right breast carcinoma with liver mets, followup. Previous exam report from 05/26/2017 indicates stable low-density liver  lesions.  CHEST CT SCAN WITH IV CONTRAST:  A cluster of low density possibly necrotic/treated left axillary lymph nodes appears unchanged. No new axillary adenopathy is seen on the right or left. No significant mediastinal adenopathy is seen. There is a subtle suggestion of a 1 cm inferior left hilar node, previously normal in size on both the prior study and the earlier study.  Lung window images show a previous linear plaque-like density in the medial left upper lobe now to appear masslike, pleural based, and slightly spiculated margins, 2.5 cm in diameter. There is increased interstitial disease extending from this lesion back to the left hilum. The appearances is rather intermediate between dense focal pneumonia and mass, with some features of both. There is no evidence of any new pulmonary parenchymal disease elsewhere and no pleural effusion. The bony structures appear grossly intact.      Impression: 1. Stable mildly enlarged necrotic appearing left axillary lymph nodes. 2. Previous area of linear scarring in the left upper lobe now appears masslike, 2.5 cm in diameter, with characteristics intermediate between neoplasm and dense focal pneumonia. New mildly enlarged left hilar lymph node presumably as a result. Please correlate with the patient's symptoms. 3. No new chest disease is seen elsewhere.  ABDOMEN AND PELVIS CT SCAN WITH IV CONTRAST:  Compared to the prior study, the patient's scattered liver lesions appear larger although less dense. As an example, the segment 4A lesion has increased from 23 to 33 mm. The dominant segment 6 lesion has increased from 6.2 to 7.2 cm, and the dorsal, segment 7 lesion has increased from 38 mm to 40 mm. Scattered small new low-density lesions are present as well.  There is a subtle suggestion of necrotic adenopathy anterior to the IVC, similar in density to the liver lesions, increased from approximately 23 to approximately 29 mm. No new node or mass is apparent  elsewhere. No adrenal lesions are seen. There are multiple renal cysts. The spleen and pancreas appear normal. No ascites or omental disease is appreciated. Bowel loops are normal in caliber.  Regarding the lower abdomen and pelvis, the uterus and ovaries are not enlarged. No mass, adenopathy, ascites, or acute inflammatory focus is seen. The bony structures appear intact.  IMPRESSION: 1. Apparent interval progression of patient's extensive liver metastatic disease, including either an exophytic liver mass or necrotic node anterior to the IVC. 2. No evidence of metastatic disease or other new disease is seen elsewhere in the abdomen or pelvis.  D:  08/10/2017 E:  08/10/2017    This report was finalized on 8/10/2017 10:12 PM by DR. Cyrus Milan MD.      Ct Abdomen Pelvis With Contrast    Result Date: 8/10/2017  Narrative: EXAMINATION: CT CHEST W CONTRAST, CT ABDOMEN AND PELVIS W CONTRAST-08/09/2017:  INDICATION: F/U scan; C50.111-Malignant neoplasm of central portion of right female breast.     TECHNIQUE: Spiral acquisition post-IV contrast 5 mm images through the chest, and 5 mm post-IV contrast portal venous phase and delayed venous phase images through the abdomen and pelvis.  The radiation dose reduction device was turned on for each scan per the ALARA (As Low as Reasonably Achievable) protocol.  COMPARISON: 05/26/2017 chest, abdomen and pelvis CT scans and earlier scans.  FINDINGS: The patient history indicates right breast carcinoma with liver mets, followup. Previous exam report from 05/26/2017 indicates stable low-density liver lesions.  CHEST CT SCAN WITH IV CONTRAST:  A cluster of low density possibly necrotic/treated left axillary lymph nodes appears unchanged. No new axillary adenopathy is seen on the right or left. No significant mediastinal adenopathy is seen. There is a subtle suggestion of a 1 cm inferior left hilar node, previously normal in size on both the prior study and the earlier study.  Lung  window images show a previous linear plaque-like density in the medial left upper lobe now to appear masslike, pleural based, and slightly spiculated margins, 2.5 cm in diameter. There is increased interstitial disease extending from this lesion back to the left hilum. The appearances is rather intermediate between dense focal pneumonia and mass, with some features of both. There is no evidence of any new pulmonary parenchymal disease elsewhere and no pleural effusion. The bony structures appear grossly intact.      Impression: 1. Stable mildly enlarged necrotic appearing left axillary lymph nodes. 2. Previous area of linear scarring in the left upper lobe now appears masslike, 2.5 cm in diameter, with characteristics intermediate between neoplasm and dense focal pneumonia. New mildly enlarged left hilar lymph node presumably as a result. Please correlate with the patient's symptoms. 3. No new chest disease is seen elsewhere.  ABDOMEN AND PELVIS CT SCAN WITH IV CONTRAST:  Compared to the prior study, the patient's scattered liver lesions appear larger although less dense. As an example, the segment 4A lesion has increased from 23 to 33 mm. The dominant segment 6 lesion has increased from 6.2 to 7.2 cm, and the dorsal, segment 7 lesion has increased from 38 mm to 40 mm. Scattered small new low-density lesions are present as well.  There is a subtle suggestion of necrotic adenopathy anterior to the IVC, similar in density to the liver lesions, increased from approximately 23 to approximately 29 mm. No new node or mass is apparent elsewhere. No adrenal lesions are seen. There are multiple renal cysts. The spleen and pancreas appear normal. No ascites or omental disease is appreciated. Bowel loops are normal in caliber.  Regarding the lower abdomen and pelvis, the uterus and ovaries are not enlarged. No mass, adenopathy, ascites, or acute inflammatory focus is seen. The bony structures appear intact.  IMPRESSION: 1.  Apparent interval progression of patient's extensive liver metastatic disease, including either an exophytic liver mass or necrotic node anterior to the IVC. 2. No evidence of metastatic disease or other new disease is seen elsewhere in the abdomen or pelvis.  D:  08/10/2017 E:  08/10/2017    This report was finalized on 8/10/2017 10:12 PM by DR. Cyrus Milan MD.        ASSESSMENT: The patient is a very pleasant 75-year-old female with right breast  cancer.     PROBLEM LIST:  1. T2N1M0 invasive ductal carcinoma of the right breast, tumor greatest  dimension 2.5 cm, estrogen receptor strongly positive, progesterone receptor  intermediate, HER-2/jean marie negative by IHC score of 0, 2 out of 8 positive  axillary lymph nodes.   2. Status post right mastectomy with axillary lymph node dissection done by  Dr. Heart on 07/30/2012.   3. Status post 4 cycles of adjuvant chemotherapy using Taxotere and Cytoxan  from 10/08/2012 until 12/10/2012.   4. Took Arimidex 1 mg p.o. daily from 01/10/2013 until 04/28/2015.   5. Relapsed disease documented on CAT scan of the chest, abdomen, and pelvis  done 04/27/2015. Revealed right chest wall mass as well as right axillary  lymphadenopathy. Biopsy done 04/17/2015 revealed invasive ductal carcinoma.   6. Enrolled on [employer]  protocol, Aromasin with placebo versus Aromasin and  Entinostat, 05/08/2015.   7. Stopped Aromasin 04/25/2016 secondary to new liver metastases documented on  CAT scan 04/22/2016.   8. Completed adjuvant radiation treatment to the right chest wall 12/09/2015.  9. Status post right axillary dissection done by Dr. Heart 03/14/2016.   10. Progressive disease documented on CAT scan done 04/22/2016 with new liver  metastases.   11. Started Faslodex Ibrance 05/02/2016.  12. Neutropenia secondary Ibrance. The patient is on 100 mg, 3 weeks on and 1  week off.   13. Rheumatoid arthritis.   14. Worsening metastatic disease documented on CT scans done on 07/25/2016.  15. Started on  carboplatin AUC 2 with gemzar given two weeks on, one week off. She is status post three cycle of treatment.   16. Mixed response to treatment documented on CAT scans done 11/15/2016.  17. On gemzar single agent, status post 6 cycles.  18.  Progressive disease documented on CAT scan done February 4, 2017.  19.  Started weekly Adriamycin February 14, 2017.   20. Changed to maintenance Doxil after maximum response from weekly Adriamycin on 5/30/2017.   21. Progressive disease documented on CAT scan done on August 9, 2017  22. Started Halaven August 21, 2017    PLAN:  1. We will proceed with treatment  using Halaven cycle 1 day 8.   2. She will continue use of EMLA cream prior to port access.  3. The patient will continue to hold her methotrexate as she is on active cancer treatment.  4. We will continue to monitor the patient's blood counts, kidney function, and liver function throughout treatment.   5. We will continue Norco 5/325 mg 1 tablet taken every 6 hours as needed for cancer-related pain.  I will go and refill it today.  She has not been needing this medication recently, but has it to be used if needed.   6.  She will have repeat CAT scans prior to cycle #3.  7.  We reviewed the side effects of this regimen including nausea, fatigue, myelosuppression, infusion reaction, peripheral neuropathy, hepatotoxicity, myelodysplasia, alopecia, constipation or diarrhea, and potential death.  8. She will be started on treatment next week.  9.  The patient follow up with me in 2 weeks.    France Chun M.D.    8/28/2017

## 2017-09-11 NOTE — PROGRESS NOTES
DATE OF VISIT: 9/11/2017    REASON FOR VISIT: Followup for   1. Breast cancer:   a) Initially presenting as stage IIB disease, T2N1M0, estrogen receptor  strongly positive, progesterone receptor intermediate, HER-2/jean marie negative.   b) Status post right mastectomy followed by adjuvant chemotherapy using  Taxotere and Cytoxan 4 cycles.   c) Took Arimidex 1 mg p.o. daily from 01/10/2013 until 04/28/2015.   2. Locally relapsed disease with right chest wall as well as axillary lymph  nodes biopsy proven metastatic disease done 04/17/2015.  3. Enrolled on clinical trial [employer]  randomizing patients for 2nd line  hormone treatment with Aromasin with placebo versus Aromasin with Entinostat.   4. Currently on Aromasin single agent.   5. Completed adjuvant radiation treatment by Dr. Benavides to the right chest  wall as well as right axillary lymph nodes 12/09/2015.   6. Currently metastatic disease with new liver metastases documented on CAT  scan 04/22/2016.   7. Started Faslodex Ibrance 05/02/2016.   8. Switched to Carbo/gemzar weekly secondary to progressive disease from 07/22/2016 till 10/11/2016.  9. Started gemzar single agent weekly 2 weeks on and 1 week off 10/25/2016.  10.  Started weekly Adriamycin single agent on February 14, 2017   11. Changed to Doxil single agent every 4 weeks on May 30, 2017.   12.  Progressive disease documented CAT scan done August 9, 2017  13.  Started Halaven in August 21, 2017    HISTORY OF PRESENT ILLNESS: The patient is a very pleasant 75-year-old female  with past medical history significant for invasive ductal carcinoma of the  right breast diagnosed 06/21/2012. The patient is status post right mastectomy  on 07/13/2012 with lymph node dissection, tumor greatest dimension was 2.5 cm,  2 out of 8 positive axial lymph nodes, clear surgical margins. The patient was  started on adjuvant chemotherapy using Taxotere and Cytoxan on 10/08/2012. The  patient completed her treatment on  12/10/2012. The patient was started on  adjuvant hormone treatment using Arimidex 1 mg p.o. daily 01/10/2013. The  patient presented with right chest wall mass. Biopsy done on 04/17/2015  revealed relapsed high-grade ductal carcinoma. She had CAT scan of the chest,  abdomen, and pelvis that revealed disease in the right chest wall as well as  right axilla. The patient was enrolled with [employer]  Aromasin with or without  Entinostat on 05/08/2015. The patient had progressive disease documented on  scans done 04/22/2016 with liver metastases. The patient was started on  Faslodex Ibrance on 05/02/2016. Repeat CT scan done 7/22/2016 showed continued progression of disease, and the patient's treatment was changed to carbo/gemzar 2 weeks on, 1 week off. Completed 5 cycles on 10/11/2016. shw started Gemzar single agent 10/25/2016.  Repeated CAT scan done February 3, 2017 showed progressive liver metastases.  Patient was switched to weekly Adriamycin started on February 14, 2017. She had stable disease documented on CT scans done 5/26/2017 and was switched to Doxil given every 4 weeks.  Repeated scan done August 9, 2017 revealed progressive disease.  Patient was switched to Halaven August 21, 2017.  She is here today for cycle 2 day 1.      SUBJECTIVE: The patient has been doing fairly well.  Her appetite had improved and she gained couple of pounds over the last month.  She has had no further nausea or vomiting. She is using Miralax as needed for constipation. She has no new pain or discomfort.      PAST MEDICAL HISTORY/SOCIAL HISTORY/FAMILY HISTORY: Unchanged from my prior documentation done on 10/2/2012.     Review of Systems   Constitutional: Positive for fatigue. Negative for activity change, chills, fever and unexpected weight change.   HENT: Negative for hearing loss, mouth sores, nosebleeds, sore throat and trouble swallowing.    Eyes: Negative for visual disturbance.   Respiratory: Negative for chest tightness,  shortness of breath and wheezing.    Cardiovascular: Negative for chest pain, palpitations and leg swelling.   Gastrointestinal: Negative for abdominal distention, abdominal pain, blood in stool, constipation, diarrhea, nausea, rectal pain and vomiting.        Poor appetite   Endocrine: Negative for cold intolerance and heat intolerance.   Genitourinary: Negative for difficulty urinating, dysuria, frequency and urgency.   Musculoskeletal: Positive for arthralgias. Negative for back pain, gait problem and myalgias.   Skin: Negative for rash.   Neurological: Negative for dizziness, tremors, syncope, weakness, light-headedness, numbness and headaches.   Hematological: Negative for adenopathy. Does not bruise/bleed easily.   Psychiatric/Behavioral: Negative for confusion, sleep disturbance and suicidal ideas. The patient is not nervous/anxious.          Current Outpatient Prescriptions:   •  albuterol (VENTOLIN HFA) 108 (90 BASE) MCG/ACT inhaler, Inhale 2 puffs Every 6 (Six) Hours As Needed for Wheezing or Shortness of Air., Disp: 18 g, Rfl: 5  •  amLODIPine (NORVASC) 10 MG tablet, Take 10 mg by mouth daily., Disp: , Rfl:   •  HYDROcodone-acetaminophen (NORCO) 5-325 MG per tablet, Take 1 tablet by mouth Every 6 (Six) Hours As Needed for Moderate Pain (4-6). 1-2 tab Q4H PRN pain, Disp: 120 tablet, Rfl: 0  •  lidocaine-prilocaine (EMLA) 2.5-2.5 % cream, Apply  topically As Needed for mild pain (1-3) (prior to port access). (Patient taking differently: Apply 1 application topically As Needed for Mild Pain (1-3) (prior to port access).), Disp: 30 g, Rfl: 2  •  megestrol (MEGACE) 40 MG/ML suspension, Take 20 mL by mouth Daily., Disp: 480 mL, Rfl: 5  •  Multiple Vitamin (TAB-A-TESS) tablet, Take 1 tablet by mouth daily., Disp: , Rfl: 0  •  ondansetron (ZOFRAN) 8 MG tablet, Take 1 tablet by mouth 3 (Three) Times a Day As Needed for Nausea or Vomiting., Disp: 30 tablet, Rfl: 5  •  ondansetron ODT (ZOFRAN-ODT) 4 MG  disintegrating tablet, dissolve 1 tablet under the tongue every 12 hours if needed before meals for nausea, Disp: , Rfl: 0  •  polyethylene glycol (MIRALAX) packet, Take 17 g by mouth Daily., Disp: 225 g, Rfl: 1  •  quinapril (ACCUPRIL) 5 MG tablet, , Disp: , Rfl: 0    PHYSICAL EXAMINATION:   /70  Pulse 88  Temp 97 °F (36.1 °C) (Temporal Artery )   Resp 16  Wt 165 lb (74.8 kg)  BMI 28.32 kg/m2   ECOG Performance Status: 1  General Appearance:  alert, cooperative, no apparent distress and appears stated age   Neurologic/Psychiatric: A&O x 3, gait steady, appropriate affect, strength 5/5 in all muscle groups   HEENT:  Normocephalic, without obvious abnormality, mucous membranes moist   Neck: Supple, symmetrical, trachea midline, no adenopathy;  No thyromegaly, masses, or tenderness   Lungs:   Clear to auscultation bilaterally; respirations regular, even, and unlabored bilaterally   Heart:  Regular rate and rhythm, no murmurs appreciated   Abdomen:   Soft, non-tender, non-distended and no organomegaly   Lymph nodes: No cervical, supraclavicular, inguinal or axillary adenopathy noted   Extremities: Normal, atraumatic; no clubbing, cyanosis, or edema    Skin: No rashes, ulcers, or suspicious lesions noted     No visits with results within 2 Week(s) from this visit.  Latest known visit with results is:    Infusion on 08/28/2017   Component Date Value Ref Range Status   • Glucose 08/28/2017 120* 70 - 100 mg/dL Final   • BUN 08/28/2017 14  9 - 23 mg/dL Final   • Creatinine 08/28/2017 1.00  0.60 - 1.30 mg/dL Final   • Sodium 08/28/2017 145  132 - 146 mmol/L Final   • Potassium 08/28/2017 3.4* 3.5 - 5.5 mmol/L Final   • Chloride 08/28/2017 109  99 - 109 mmol/L Final   • CO2 08/28/2017 29.0  20.0 - 31.0 mmol/L Final   • Calcium 08/28/2017 8.5* 8.7 - 10.4 mg/dL Final   • eGFR   Amer 08/28/2017 65  >60 mL/min/1.73 Final   • BUN/Creatinine Ratio 08/28/2017 14.0  7.0 - 25.0 Final   • Anion Gap 08/28/2017 7.0  3.0  - 11.0 mmol/L Final   • Magnesium 08/28/2017 1.5  1.3 - 2.7 mg/dL Final   • WBC 08/28/2017 2.50* 3.50 - 10.80 10*3/mm3 Final   • RBC 08/28/2017 3.61* 3.89 - 5.14 10*6/mm3 Final   • Hemoglobin 08/28/2017 11.0* 11.5 - 15.5 g/dL Final   • Hematocrit 08/28/2017 33.9* 34.5 - 44.0 % Final   • RDW 08/28/2017 15.5* 11.3 - 14.5 % Final   • MCV 08/28/2017 93.9  80.0 - 99.0 fL Final   • MCH 08/28/2017 30.4  27.0 - 31.0 pg Final   • MCHC 08/28/2017 32.4  32.0 - 36.0 g/dL Final   • MPV 08/28/2017 7.7  6.0 - 12.0 fL Final   • Platelets 08/28/2017 214  150 - 450 10*3/mm3 Final   • Neutrophil % 08/28/2017 39.3* 41.0 - 71.0 % Final   • Lymphocyte % 08/28/2017 53.7* 24.0 - 44.0 % Final   • Monocyte % 08/28/2017 7.0  0.0 - 12.0 % Final   • Neutrophils, Absolute 08/28/2017 1.00* 1.50 - 8.30 10*3/mm3 Final   • Lymphocytes, Absolute 08/28/2017 1.30  0.60 - 4.80 10*3/mm3 Final   • Monocytes, Absolute 08/28/2017 0.20  0.00 - 1.00 10*3/mm3 Final   • Creatinine 08/28/2017 1.00  0.60 - 1.30 mg/dL Final    Serial Number: [id number]:  [id number]        No results found.    ASSESSMENT: The patient is a very pleasant 75-year-old female with right breast  cancer.     PROBLEM LIST:  1. T2N1M0 invasive ductal carcinoma of the right breast, tumor greatest  dimension 2.5 cm, estrogen receptor strongly positive, progesterone receptor  intermediate, HER-2/jean marie negative by IHC score of 0, 2 out of 8 positive  axillary lymph nodes.   2. Status post right mastectomy with axillary lymph node dissection done by  Dr. Page on 07/30/2012.   3. Status post 4 cycles of adjuvant chemotherapy using Taxotere and Cytoxan  from 10/08/2012 until 12/10/2012.   4. Took Arimidex 1 mg p.o. daily from 01/10/2013 until 04/28/2015.   5. Relapsed disease documented on CAT scan of the chest, abdomen, and pelvis  done 04/27/2015. Revealed right chest wall mass as well as right axillary  lymphadenopathy. Biopsy done 04/17/2015 revealed invasive ductal carcinoma.   6. Enrolled on  [employer]  protocol, Aromasin with placebo versus Aromasin and  Entinostat, 05/08/2015.   7. Stopped Aromasin 04/25/2016 secondary to new liver metastases documented on  CAT scan 04/22/2016.   8. Completed adjuvant radiation treatment to the right chest wall 12/09/2015.  9. Status post right axillary dissection done by Dr. Heart 03/14/2016.   10. Progressive disease documented on CAT scan done 04/22/2016 with new liver  metastases.   11. Started Faslodex Ibrance 05/02/2016.  12. Neutropenia secondary Ibrance. The patient is on 100 mg, 3 weeks on and 1  week off.   13. Rheumatoid arthritis.   14. Worsening metastatic disease documented on CT scans done on 07/25/2016.  15. Started on carboplatin AUC 2 with gemzar given two weeks on, one week off. She is status post three cycle of treatment.   16. Mixed response to treatment documented on CAT scans done 11/15/2016.  17. On gemzar single agent, status post 6 cycles.  18.  Progressive disease documented on CAT scan done February 4, 2017.  19.  Started weekly Adriamycin February 14, 2017.   20. Changed to maintenance Doxil after maximum response from weekly Adriamycin on 5/30/2017.   21. Progressive disease documented on CAT scan done on August 9, 2017, status post 1 cycle  22. Started Halaven August 21, 2017    PLAN:  1. We will proceed with treatment  using Halaven cycle 2 day 1.   2. She will continue use of EMLA cream prior to port access.  3. The patient will continue to hold her methotrexate as she is on active cancer treatment.  4. We will continue to monitor the patient's blood counts, kidney function, and liver function throughout treatment.   5. We will continue Norco 5/325 mg 1 tablet taken every 6 hours as needed for cancer-related pain.  I will go and refill it today.  She has not been needing this medication recently, but has it to be used if needed.   6.  She will have repeat CAT scans after cycle #3.  7.  We reviewed the side effects of this regimen  including nausea, fatigue, myelosuppression, infusion reaction, peripheral neuropathy, hepatotoxicity, myelodysplasia, alopecia, constipation or diarrhea, and potential death.  8. She will be started on treatment next week.  9.  The patient follow up with me in 3 weeks.    France Chun M.D.    9/11/2017

## 2017-09-20 NOTE — TELEPHONE ENCOUNTER
----- Message from Louise Cabrera sent at 9/20/2017 10:31 AM EDT -----  Regarding: BELEM- TOENAIL CAME OFF  Contact: [phone]  PATIENT CALLED AND SAID HER TOENAIL CAME OFF. IT'S NOT HURTING BUT SHE WANTED YOU TO KNOW.     IF YOU NEED TO CALL HER, CALL HER DAUGHTER HELDER [phone]

## 2017-09-20 NOTE — TELEPHONE ENCOUNTER
Spoke with patient's daughter, and advised that patient can try OTC tea tree oil to help with her nail changes

## 2017-10-02 NOTE — PROGRESS NOTES
DATE OF VISIT: 10/2/2017    REASON FOR VISIT: Followup for   1. Breast cancer:   a) Initially presenting as stage IIB disease, T2N1M0, estrogen receptor  strongly positive, progesterone receptor intermediate, HER-2/jean marie negative.   b) Status post right mastectomy followed by adjuvant chemotherapy using  Taxotere and Cytoxan 4 cycles.   c) Took Arimidex 1 mg p.o. daily from 01/10/2013 until 04/28/2015.   2. Locally relapsed disease with right chest wall as well as axillary lymph  nodes biopsy proven metastatic disease done 04/17/2015.  3. Enrolled on clinical trial [employer]  randomizing patients for 2nd line  hormone treatment with Aromasin with placebo versus Aromasin with Entinostat.   4. Currently on Aromasin single agent.   5. Completed adjuvant radiation treatment by Dr. Benavides to the right chest  wall as well as right axillary lymph nodes 12/09/2015.   6. Currently metastatic disease with new liver metastases documented on CAT  scan 04/22/2016.   7. Started Faslodex Ibrance 05/02/2016.   8. Switched to Carbo/gemzar weekly secondary to progressive disease from 07/22/2016 till 10/11/2016.  9. Started gemzar single agent weekly 2 weeks on and 1 week off 10/25/2016.  10.  Started weekly Adriamycin single agent on February 14, 2017   11. Changed to Doxil single agent every 4 weeks on May 30, 2017.   12.  Progressive disease documented CAT scan done August 9, 2017  13.  Started Halaven in August 21, 2017    HISTORY OF PRESENT ILLNESS: The patient is a very pleasant 75-year-old female  with past medical history significant for invasive ductal carcinoma of the  right breast diagnosed 06/21/2012. The patient is status post right mastectomy  on 07/13/2012 with lymph node dissection, tumor greatest dimension was 2.5 cm,  2 out of 8 positive axial lymph nodes, clear surgical margins. The patient was  started on adjuvant chemotherapy using Taxotere and Cytoxan on 10/08/2012. The  patient completed her treatment on  12/10/2012. The patient was started on  adjuvant hormone treatment using Arimidex 1 mg p.o. daily 01/10/2013. The  patient presented with right chest wall mass. Biopsy done on 04/17/2015  revealed relapsed high-grade ductal carcinoma. She had CAT scan of the chest,  abdomen, and pelvis that revealed disease in the right chest wall as well as  right axilla. The patient was enrolled with [employer]  Aromasin with or without  Entinostat on 05/08/2015. The patient had progressive disease documented on  scans done 04/22/2016 with liver metastases. The patient was started on  Faslodex Ibrance on 05/02/2016. Repeat CT scan done 7/22/2016 showed continued progression of disease, and the patient's treatment was changed to carbo/gemzar 2 weeks on, 1 week off. Completed 5 cycles on 10/11/2016. shw started Gemzar single agent 10/25/2016.  Repeated CAT scan done February 3, 2017 showed progressive liver metastases.  Patient was switched to weekly Adriamycin started on February 14, 2017. She had stable disease documented on CT scans done 5/26/2017 and was switched to Doxil given every 4 weeks.  Repeated scan done August 9, 2017 revealed progressive disease.  Patient was switched to Halaven August 21, 2017.  She is here today for cycle 3 day 1.      SUBJECTIVE: The patient has been doing fairly well.  Her appetite had improved and she gained couple of pounds over the last month.  She has had no further nausea or vomiting. She is using Miralax as needed for constipation. She has no new pain or discomfort.      PAST MEDICAL HISTORY/SOCIAL HISTORY/FAMILY HISTORY: Unchanged from my prior documentation done on 10/2/2012.     Review of Systems   Constitutional: Positive for fatigue. Negative for activity change, chills, fever and unexpected weight change.   HENT: Negative for hearing loss, mouth sores, nosebleeds, sore throat and trouble swallowing.    Eyes: Negative for visual disturbance.   Respiratory: Negative for chest tightness,  shortness of breath and wheezing.    Cardiovascular: Negative for chest pain, palpitations and leg swelling.   Gastrointestinal: Negative for abdominal distention, abdominal pain, blood in stool, constipation, diarrhea, nausea, rectal pain and vomiting.        Poor appetite   Endocrine: Negative for cold intolerance and heat intolerance.   Genitourinary: Negative for difficulty urinating, dysuria, frequency and urgency.   Musculoskeletal: Positive for arthralgias. Negative for back pain, gait problem and myalgias.   Skin: Negative for rash.   Neurological: Negative for dizziness, tremors, syncope, weakness, light-headedness, numbness and headaches.   Hematological: Negative for adenopathy. Does not bruise/bleed easily.   Psychiatric/Behavioral: Negative for confusion, sleep disturbance and suicidal ideas. The patient is not nervous/anxious.          Current Outpatient Prescriptions:   •  albuterol (VENTOLIN HFA) 108 (90 BASE) MCG/ACT inhaler, Inhale 2 puffs Every 6 (Six) Hours As Needed for Wheezing or Shortness of Air., Disp: 18 g, Rfl: 5  •  amLODIPine (NORVASC) 10 MG tablet, Take 10 mg by mouth daily., Disp: , Rfl:   •  HYDROcodone-acetaminophen (NORCO) 5-325 MG per tablet, Take 1 tablet by mouth Every 6 (Six) Hours As Needed for Moderate Pain ., Disp: 120 tablet, Rfl: 0  •  lidocaine-prilocaine (EMLA) 2.5-2.5 % cream, Apply  topically As Needed for mild pain (1-3) (prior to port access). (Patient taking differently: Apply 1 application topically As Needed for Mild Pain (1-3) (prior to port access).), Disp: 30 g, Rfl: 2  •  megestrol (MEGACE) 40 MG/ML suspension, Take 20 mL by mouth Daily., Disp: 480 mL, Rfl: 5  •  Multiple Vitamin (TAB-A-TESS) tablet, Take 1 tablet by mouth daily., Disp: , Rfl: 0  •  ondansetron (ZOFRAN) 8 MG tablet, Take 1 tablet by mouth 3 (Three) Times a Day As Needed for Nausea or Vomiting., Disp: 30 tablet, Rfl: 5  •  ondansetron ODT (ZOFRAN-ODT) 4 MG disintegrating tablet, dissolve 1  tablet under the tongue every 12 hours if needed before meals for nausea, Disp: , Rfl: 0  •  polyethylene glycol (MIRALAX) packet, Take 17 g by mouth Daily., Disp: 225 g, Rfl: 1  •  quinapril (ACCUPRIL) 5 MG tablet, , Disp: , Rfl: 0    PHYSICAL EXAMINATION:   /77  Pulse 104  Temp 97.7 °F (36.5 °C) (Temporal Artery )   Resp 15  Wt 160 lb (72.6 kg)  BMI 27.46 kg/m2   ECOG Performance Status: 1  General Appearance:  alert, cooperative, no apparent distress and appears stated age   Neurologic/Psychiatric: A&O x 3, gait steady, appropriate affect, strength 5/5 in all muscle groups   HEENT:  Normocephalic, without obvious abnormality, mucous membranes moist   Neck: Supple, symmetrical, trachea midline, no adenopathy;  No thyromegaly, masses, or tenderness   Lungs:   Clear to auscultation bilaterally; respirations regular, even, and unlabored bilaterally   Heart:  Regular rate and rhythm, no murmurs appreciated   Abdomen:   Soft, non-tender, non-distended and no organomegaly   Lymph nodes: No cervical, supraclavicular, inguinal or axillary adenopathy noted   Extremities: Normal, atraumatic; no clubbing, cyanosis, or edema    Skin: No rashes, ulcers, or suspicious lesions noted     No visits with results within 2 Week(s) from this visit.  Latest known visit with results is:    Infusion on 09/18/2017   Component Date Value Ref Range Status   • Glucose 09/18/2017 142* 70 - 100 mg/dL Final   • BUN 09/18/2017 11  9 - 23 mg/dL Final   • Creatinine 09/18/2017 0.80  0.60 - 1.30 mg/dL Final   • Sodium 09/18/2017 139  132 - 146 mmol/L Final   • Potassium 09/18/2017 3.6  3.5 - 5.5 mmol/L Final   • Chloride 09/18/2017 103  99 - 109 mmol/L Final   • CO2 09/18/2017 34.0* 20.0 - 31.0 mmol/L Final   • Calcium 09/18/2017 8.8  8.7 - 10.4 mg/dL Final   • eGFR   Amer 09/18/2017 84  >60 mL/min/1.73 Final   • BUN/Creatinine Ratio 09/18/2017 13.8  7.0 - 25.0 Final   • Anion Gap 09/18/2017 2.0* 3.0 - 11.0 mmol/L Final   •  Magnesium 09/18/2017 1.6  1.3 - 2.7 mg/dL Final   • WBC 09/18/2017 3.60  3.50 - 10.80 10*3/mm3 Final   • RBC 09/18/2017 3.57* 3.89 - 5.14 10*6/mm3 Final   • Hemoglobin 09/18/2017 10.3* 11.5 - 15.5 g/dL Final   • Hematocrit 09/18/2017 33.9* 34.5 - 44.0 % Final   • RDW 09/18/2017 17.7* 11.3 - 14.5 % Final   • MCV 09/18/2017 94.9  80.0 - 99.0 fL Final   • MCH 09/18/2017 29.0  27.0 - 31.0 pg Final   • MCHC 09/18/2017 30.5* 32.0 - 36.0 g/dL Final   • MPV 09/18/2017 7.4  6.0 - 12.0 fL Final   • Platelets 09/18/2017 295  150 - 450 10*3/mm3 Final    Verified by repeat analysis.    • Neutrophil % 09/18/2017 62.7  41.0 - 71.0 % Final   • Lymphocyte % 09/18/2017 34.5  24.0 - 44.0 % Final   • Monocyte % 09/18/2017 2.8  0.0 - 12.0 % Final   • Neutrophils, Absolute 09/18/2017 2.30  1.50 - 8.30 10*3/mm3 Final   • Lymphocytes, Absolute 09/18/2017 1.20  0.60 - 4.80 10*3/mm3 Final   • Monocytes, Absolute 09/18/2017 0.10  0.00 - 1.00 10*3/mm3 Final   • Creatinine 09/18/2017 0.90  0.60 - 1.30 mg/dL Final    Serial Number: [id number]:  [id number]        No results found.    ASSESSMENT: The patient is a very pleasant 75-year-old female with right breast  cancer.     PROBLEM LIST:  1. T2N1M0 invasive ductal carcinoma of the right breast, tumor greatest  dimension 2.5 cm, estrogen receptor strongly positive, progesterone receptor  intermediate, HER-2/jean marie negative by IHC score of 0, 2 out of 8 positive  axillary lymph nodes.   2. Status post right mastectomy with axillary lymph node dissection done by  Dr. Heart on 07/30/2012.   3. Status post 4 cycles of adjuvant chemotherapy using Taxotere and Cytoxan  from 10/08/2012 until 12/10/2012.   4. Took Arimidex 1 mg p.o. daily from 01/10/2013 until 04/28/2015.   5. Relapsed disease documented on CAT scan of the chest, abdomen, and pelvis  done 04/27/2015. Revealed right chest wall mass as well as right axillary  lymphadenopathy. Biopsy done 04/17/2015 revealed invasive ductal carcinoma.   6.  Enrolled on [employer]  protocol, Aromasin with placebo versus Aromasin and  Entinostat, 05/08/2015.   7. Stopped Aromasin 04/25/2016 secondary to new liver metastases documented on  CAT scan 04/22/2016.   8. Completed adjuvant radiation treatment to the right chest wall 12/09/2015.  9. Status post right axillary dissection done by Dr. Heart 03/14/2016.   10. Progressive disease documented on CAT scan done 04/22/2016 with new liver  metastases.   11. Started Faslodex Ibrance 05/02/2016.  12. Neutropenia secondary Ibrance. The patient is on 100 mg, 3 weeks on and 1  week off.   13. Rheumatoid arthritis.   14. Worsening metastatic disease documented on CT scans done on 07/25/2016.  15. Started on carboplatin AUC 2 with gemzar given two weeks on, one week off. She is status post three cycle of treatment.   16. Mixed response to treatment documented on CAT scans done 11/15/2016.  17. On gemzar single agent, status post 6 cycles.  18.  Progressive disease documented on CAT scan done February 4, 2017.  19.  Started weekly Adriamycin February 14, 2017.   20. Changed to maintenance Doxil after maximum response from weekly Adriamycin on 5/30/2017.   21. Progressive disease documented on CAT scan done on August 9, 2017  22. Started Halaven August 21, 2017, status post 2 cycle    PLAN:  1. We will proceed with treatment  using Halaven cycle 3 day 1.   2. She will continue use of EMLA cream prior to port access.  3. The patient will continue to hold her methotrexate as she is on active cancer treatment.  4. We will continue to monitor the patient's blood counts, kidney function, and liver function throughout treatment.   5. We will continue Norco 5/325 mg 1 tablet taken every 6 hours as needed for cancer-related pain.  I will go and refill it today.  She has not been needing this medication recently, but has it to be used if needed.   6.  She will have repeat CAT scans after cycle #3.  This will be ordered prior to return.  7.   We reviewed the side effects of this regimen including nausea, fatigue, myelosuppression, infusion reaction, peripheral neuropathy, hepatotoxicity, myelodysplasia, alopecia, constipation or diarrhea, and potential death.  8.  She will be treated again next week.  9.  The patient will follow up with me in 3 weeks.     France Chun M.D.    10/2/2017

## 2017-10-15 NOTE — ED PROVIDER NOTES
Subjective   HPI Comments: 77-year-old female presents to the emergency department with complaints of a runny nose.  The patient has had runny nose for about 2 days.  She has had some occasional sneezing.  She thinks that she may have seasonal allergies.  The patient has not used any over-the-counter products.  The patient has a history of breast cancer (initially felt to be resolved, but with subsequent lymph node involvement and a small liver lesion).  She is undergoing chemotherapy.  Her PCP is Dr. Virk.  No fever.  No sinus pain.  No epistaxis.      History provided by:  Patient      Review of Systems   Constitutional: Negative for chills and fever.   HENT: Positive for rhinorrhea and sneezing. Negative for ear pain, sinus pain, sinus pressure and sore throat.    Eyes: Negative for pain and discharge.   Respiratory: Negative for cough and shortness of breath.    Cardiovascular: Negative for chest pain.   Gastrointestinal: Negative for abdominal pain, diarrhea and nausea.   Genitourinary: Negative for difficulty urinating and dysuria.   Musculoskeletal: Negative for neck pain.   Allergic/Immunologic: Positive for immunocompromised state (secondary to chemotherapy).   Neurological: Negative for dizziness and headaches.   Hematological: Negative for adenopathy.   Psychiatric/Behavioral: Negative for confusion.       Past Medical History:   Diagnosis Date   • Arthritis    • Breast cancer 07/2012   • Disease of thyroid gland    • Drug therapy    • Hx of radiation therapy    • Hypertension    • Liver metastasis 8/17/2016   • Osteoporosis        Allergies   Allergen Reactions   • Penicillin V Potassium [Penicillin V] Hives       Past Surgical History:   Procedure Laterality Date   • BREAST BIOPSY     • MASTECTOMY Right 07/2012   • MASTECTOMY MODIFIED RADICAL W/ AXILLARY LYMPH NODES W/ OR W/O PECTORALIS MINOR     • PORTACATH PLACEMENT         Family History   Problem Relation Age of Onset   • No Known Problems Other     • Breast cancer Neg Hx    • Ovarian cancer Neg Hx        Social History     Social History   • Marital status: Single     Spouse name: N/A   • Number of children: N/A   • Years of education: N/A     Social History Main Topics   • Smoking status: Former Smoker   • Smokeless tobacco: Never Used   • Alcohol use No   • Drug use: No   • Sexual activity: Defer     Other Topics Concern   • None     Social History Narrative           Objective   Physical Exam   Constitutional: She is oriented to person, place, and time. She appears well-developed and well-nourished. No distress.   HENT:   Head: Normocephalic and atraumatic.   Mouth/Throat: Oropharynx is clear and moist.   Boggy nasal mucosa with mild clear rhinorrhea   Eyes: EOM are normal. Pupils are equal, round, and reactive to light. Left eye exhibits no discharge. No scleral icterus.   Neck: Normal range of motion. Neck supple.   Cardiovascular: Normal rate, regular rhythm, normal heart sounds and intact distal pulses.    No murmur heard.  Pulmonary/Chest: Effort normal and breath sounds normal. No respiratory distress. She has no wheezes. She has no rales. She exhibits no tenderness.   Abdominal: Soft. Bowel sounds are normal. There is no tenderness.   Musculoskeletal: Normal range of motion. She exhibits no edema or tenderness.   Neurological: She is alert and oriented to person, place, and time.   Skin: Skin is warm and dry. No rash noted. She is not diaphoretic.   Psychiatric: She has a normal mood and affect.   Nursing note and vitals reviewed.      Procedures         ED Course  ED Course    The patient appears in no distress.  She has some sneezing and runny nose.  I will plan to treat her with an antihistamine and Flonase.              MDM    Final diagnoses:   Acute seasonal allergic rhinitis due to other allergen            ROB Murillo  10/15/17 1142

## 2017-10-20 NOTE — TELEPHONE ENCOUNTER
----- Message from Junior Michaud sent at 10/20/2017 10:43 AM EDT -----  Regarding: Adiel, patient thinks she has a cold, wants Dr. Chun to write her a script  Contact: [phone]  Patient wants to know if Dr. Chun could write script for her for cold medication. She is coming to the hospital for a CT, so she was wondering if she cold pick it up here.     [phone]

## 2017-10-20 NOTE — TELEPHONE ENCOUNTER
Patient reports that she was given zyrtec, and now has a cough and with clear thick secretions.  Advised patient to get mucinex OTC for secretions.  Patient verbalized understanding.

## 2017-10-24 NOTE — PROGRESS NOTES
DATE OF VISIT: 10/24/2017    REASON FOR VISIT: Followup for   1. Breast cancer:   a) Initially presenting as stage IIB disease, T2N1M0, estrogen receptor  strongly positive, progesterone receptor intermediate, HER-2/jean marie negative.   b) Status post right mastectomy followed by adjuvant chemotherapy using  Taxotere and Cytoxan 4 cycles.   c) Took Arimidex 1 mg p.o. daily from 01/10/2013 until 04/28/2015.   2. Locally relapsed disease with right chest wall as well as axillary lymph  nodes biopsy proven metastatic disease done 04/17/2015.  3. Enrolled on clinical trial [employer]  randomizing patients for 2nd line  hormone treatment with Aromasin with placebo versus Aromasin with Entinostat.   4. Currently on Aromasin single agent.   5. Completed adjuvant radiation treatment by Dr. Benavides to the right chest  wall as well as right axillary lymph nodes 12/09/2015.   6. Currently metastatic disease with new liver metastases documented on CAT  scan 04/22/2016.   7. Started Faslodex Ibrance 05/02/2016.   8. Switched to Carbo/gemzar weekly secondary to progressive disease from 07/22/2016 till 10/11/2016.  9. Started gemzar single agent weekly 2 weeks on and 1 week off 10/25/2016.  10.  Started weekly Adriamycin single agent on February 14, 2017   11. Changed to Doxil single agent every 4 weeks on May 30, 2017.   12.  Progressive disease documented CAT scan done August 9, 2017  13.  Started Halaven in August 21, 2017    HISTORY OF PRESENT ILLNESS: The patient is a very pleasant 75-year-old female  with past medical history significant for invasive ductal carcinoma of the  right breast diagnosed 06/21/2012. The patient is status post right mastectomy  on 07/13/2012 with lymph node dissection, tumor greatest dimension was 2.5 cm,  2 out of 8 positive axial lymph nodes, clear surgical margins. The patient was  started on adjuvant chemotherapy using Taxotere and Cytoxan on 10/08/2012. The  patient completed her treatment on  12/10/2012. The patient was started on  adjuvant hormone treatment using Arimidex 1 mg p.o. daily 01/10/2013. The  patient presented with right chest wall mass. Biopsy done on 04/17/2015  revealed relapsed high-grade ductal carcinoma. She had CAT scan of the chest,  abdomen, and pelvis that revealed disease in the right chest wall as well as  right axilla. The patient was enrolled with [employer]  Aromasin with or without  Entinostat on 05/08/2015. The patient had progressive disease documented on  scans done 04/22/2016 with liver metastases. The patient was started on  Faslodex Ibrance on 05/02/2016. Repeat CT scan done 7/22/2016 showed continued progression of disease, and the patient's treatment was changed to carbo/gemzar 2 weeks on, 1 week off. Completed 5 cycles on 10/11/2016. shw started Gemzar single agent 10/25/2016.  Repeated CAT scan done February 3, 2017 showed progressive liver metastases.  Patient was switched to weekly Adriamycin started on February 14, 2017. She had stable disease documented on CT scans done 5/26/2017 and was switched to Doxil given every 4 weeks.  Repeated scan done August 9, 2017 revealed progressive disease.  Patient was switched to Halaven August 21, 2017.  She is here today for cycle 4 day 1.      SUBJECTIVE: The patient has been doing fairly well.  Her appetite had improved and she gained couple of pounds over the last month.  She has had no further nausea or vomiting. She is using Miralax as needed for constipation. She has no new pain or discomfort.      PAST MEDICAL HISTORY/SOCIAL HISTORY/FAMILY HISTORY: Unchanged from my prior documentation done on 10/2/2012.     Review of Systems   Constitutional: Positive for fatigue. Negative for activity change, chills, fever and unexpected weight change.   HENT: Negative for hearing loss, mouth sores, nosebleeds, sore throat and trouble swallowing.    Eyes: Negative for visual disturbance.   Respiratory: Negative for chest tightness,  shortness of breath and wheezing.    Cardiovascular: Negative for chest pain, palpitations and leg swelling.   Gastrointestinal: Negative for abdominal distention, abdominal pain, blood in stool, constipation, diarrhea, nausea, rectal pain and vomiting.        Poor appetite   Endocrine: Negative for cold intolerance and heat intolerance.   Genitourinary: Negative for difficulty urinating, dysuria, frequency and urgency.   Musculoskeletal: Positive for arthralgias. Negative for back pain, gait problem and myalgias.   Skin: Negative for rash.   Neurological: Negative for dizziness, tremors, syncope, weakness, light-headedness, numbness and headaches.   Hematological: Negative for adenopathy. Does not bruise/bleed easily.   Psychiatric/Behavioral: Negative for confusion, sleep disturbance and suicidal ideas. The patient is not nervous/anxious.          Current Outpatient Prescriptions:   •  albuterol (VENTOLIN HFA) 108 (90 BASE) MCG/ACT inhaler, Inhale 2 puffs Every 6 (Six) Hours As Needed for Wheezing or Shortness of Air., Disp: 18 g, Rfl: 5  •  amLODIPine (NORVASC) 10 MG tablet, Take 10 mg by mouth daily., Disp: , Rfl:   •  cetirizine (ZyrTEC) 5 MG chewable tablet, Chew 1 tablet Daily., Disp: 30 tablet, Rfl: 0  •  fluticasone (FLONASE) 50 MCG/ACT nasal spray, 1 spray into each nostril Daily., Disp: 15.8 mL, Rfl: 0  •  HYDROcodone-acetaminophen (NORCO) 5-325 MG per tablet, Take 1 tablet by mouth Every 6 (Six) Hours As Needed for Moderate Pain ., Disp: 120 tablet, Rfl: 0  •  lidocaine-prilocaine (EMLA) 2.5-2.5 % cream, Apply  topically As Needed for mild pain (1-3) (prior to port access). (Patient taking differently: Apply 1 application topically As Needed for Mild Pain (1-3) (prior to port access).), Disp: 30 g, Rfl: 2  •  megestrol (MEGACE) 40 MG/ML suspension, Take 20 mL by mouth Daily., Disp: 480 mL, Rfl: 5  •  Multiple Vitamin (TAB-A-TESS) tablet, Take 1 tablet by mouth daily., Disp: , Rfl: 0  •  ondansetron  "(ZOFRAN) 8 MG tablet, Take 1 tablet by mouth 3 (Three) Times a Day As Needed for Nausea or Vomiting., Disp: 30 tablet, Rfl: 5  •  ondansetron ODT (ZOFRAN-ODT) 4 MG disintegrating tablet, dissolve 1 tablet under the tongue every 12 hours if needed before meals for nausea, Disp: , Rfl: 0  •  polyethylene glycol (MIRALAX) packet, Take 17 g by mouth Daily., Disp: 225 g, Rfl: 1  •  quinapril (ACCUPRIL) 5 MG tablet, , Disp: , Rfl: 0  No current facility-administered medications for this visit.     Facility-Administered Medications Ordered in Other Visits:   •  dexamethasone (DECADRON) 12 mg in sodium chloride 0.9 % IVPB, 12 mg, Intravenous, Once, France Chun MD, Last Rate: 200 mL/hr at 10/24/17 1537, 12 mg at 10/24/17 1537  •  eriBULin (HALAVEN) chemo injection 2.52 mg, 1.4 mg/m2 (Treatment Plan Recorded), Intravenous, Once, France Chun MD  •  heparin flush (porcine) 100 UNIT/ML injection 500 Units, 500 Units, Intravenous, PRN, France Chun MD    PHYSICAL EXAMINATION:   /68  Pulse 118  Temp 97.3 °F (36.3 °C)  Resp 22  Ht 64\" (162.6 cm)  Wt 148 lb (67.1 kg)  SpO2 95%  BMI 25.4 kg/m2   ECOG Performance Status: 1  General Appearance:  alert, cooperative, no apparent distress and appears stated age   Neurologic/Psychiatric: A&O x 3, gait steady, appropriate affect, strength 5/5 in all muscle groups   HEENT:  Normocephalic, without obvious abnormality, mucous membranes moist   Neck: Supple, symmetrical, trachea midline, no adenopathy;  No thyromegaly, masses, or tenderness   Lungs:   Clear to auscultation bilaterally; respirations regular, even, and unlabored bilaterally   Heart:  Regular rate and rhythm, no murmurs appreciated   Abdomen:   Soft, non-tender, non-distended and no organomegaly   Lymph nodes: No cervical, supraclavicular, inguinal or axillary adenopathy noted   Extremities: Normal, atraumatic; no clubbing, cyanosis, or edema    Skin: No rashes, ulcers, or suspicious lesions noted     Infusion " on 10/24/2017   Component Date Value Ref Range Status   • Glucose 10/24/2017 103* 70 - 100 mg/dL Final   • BUN 10/24/2017 14  9 - 23 mg/dL Final   • Creatinine 10/24/2017 0.90  0.60 - 1.30 mg/dL Final   • Sodium 10/24/2017 137  132 - 146 mmol/L Final   • Potassium 10/24/2017 4.1  3.5 - 5.5 mmol/L Final   • Chloride 10/24/2017 100  99 - 109 mmol/L Final   • CO2 10/24/2017 31.0  20.0 - 31.0 mmol/L Final   • Calcium 10/24/2017 8.6* 8.7 - 10.4 mg/dL Final   • Total Protein 10/24/2017 6.5  5.7 - 8.2 g/dL Final   • Albumin 10/24/2017 3.40  3.20 - 4.80 g/dL Final   • ALT (SGPT) 10/24/2017 16  7 - 40 U/L Final   • AST (SGOT) 10/24/2017 38* 0 - 33 U/L Final   • Alkaline Phosphatase 10/24/2017 115* 25 - 100 U/L Final   • Total Bilirubin 10/24/2017 0.4  0.3 - 1.2 mg/dL Final   • eGFR   Amer 10/24/2017 73  >60 mL/min/1.73 Final   • Globulin 10/24/2017 3.1  gm/dL Final   • A/G Ratio 10/24/2017 1.1* 1.5 - 2.5 g/dL Final   • BUN/Creatinine Ratio 10/24/2017 15.6  7.0 - 25.0 Final   • Anion Gap 10/24/2017 6.0  3.0 - 11.0 mmol/L Final   • WBC 10/24/2017 9.90  3.50 - 10.80 10*3/mm3 Final   • RBC 10/24/2017 3.55* 3.89 - 5.14 10*6/mm3 Final   • Hemoglobin 10/24/2017 10.2* 11.5 - 15.5 g/dL Final   • Hematocrit 10/24/2017 32.9* 34.5 - 44.0 % Final   • RDW 10/24/2017 18.3* 11.3 - 14.5 % Final   • MCV 10/24/2017 92.9  80.0 - 99.0 fL Final   • MCH 10/24/2017 28.7  27.0 - 31.0 pg Final   • MCHC 10/24/2017 30.9* 32.0 - 36.0 g/dL Final   • MPV 10/24/2017 7.1  6.0 - 12.0 fL Final   • Platelets 10/24/2017 368  150 - 450 10*3/mm3 Final   • Neutrophil % 10/24/2017 80.6* 41.0 - 71.0 % Final   • Lymphocyte % 10/24/2017 16.8* 24.0 - 44.0 % Final   • Monocyte % 10/24/2017 2.6  0.0 - 12.0 % Final   • Neutrophils, Absolute 10/24/2017 8.00  1.50 - 8.30 10*3/mm3 Final   • Lymphocytes, Absolute 10/24/2017 1.70  0.60 - 4.80 10*3/mm3 Final   • Monocytes, Absolute 10/24/2017 0.30  0.00 - 1.00 10*3/mm3 Final   • Creatine, Serum 10/24/2017 1.0   Final   •  Creatinine 10/24/2017 1.00  0.60 - 1.30 mg/dL Final    Serial Number: [id number]:  [id number]        Ct Chest With Contrast    Result Date: 10/21/2017  Narrative: EXAMINATION: CT CHEST W CONTRAST - 10/20/2017  INDICATION: C50.111-Malignant neoplasm of central portion of right female breast; Z17.1-Estrogen receptor negative status (ER).  TECHNIQUE: CT scan of the chest was performed following intravenous contrast.  The radiation dose reduction device was turned on for each scan per the ALARA (As Low as Reasonably Achievable) protocol.  COMPARISON: 8/09/2017.  FINDINGS: There is no axillary lymphadenopathy. There is a small seroma in the lateral aspect of the right breast which is unchanged. There is no mediastinal or hilar lymphadenopathy. There is no pericardial or pleural effusion. Images displayed at lung window settings reveal no acute inflammatory process. The area of plaque-like nodularity abutting the leftward aspect of the anterior mediastinum is smaller than on the previous examination. There has otherwise been no significant change.      Impression: There has been interval improvement when compared with 8/09/2017. Specifically the left axillary lymph nodes are now of normal size and the left upper lobe pulmonary mass is significantly smaller than on the previous exam.  DICTATED:     10/20/2017 EDITED:          10/20/2017      This report was finalized on 10/21/2017 9:15 AM by Dr. Aaron Meadows MD.      Ct Abdomen Pelvis With Contrast    Result Date: 10/21/2017  Narrative: EXAMINATION: CT ABDOMEN PELVIS W/CONTRAST - 10/20/2017  INDICATION: C50.111-Malignant neoplasm of central portion of right female breast; Z17.1-Estrogen receptor negative status (ER-).  TECHNIQUE: CT scan of abdomen pelvis performed following intravenous contrast.  The radiation dose reduction device was turned on for each scan per the ALARA (As Low as Reasonably Achievable) protocol.  COMPARISON: 8/09/.  FINDINGS: There are several low  dense lesions in the liver consistent with extensive metastatic disease. Nevertheless these abnormalities have shown interval improvement. The spleen is normal. There is no adrenal or pancreatic mass. There is a simple cyst in the right kidney. There is no solid renal mass, stone or obstruction. There is no ascites, aneurysm or retroperitoneal lymphadenopathy. There is no pelvic mass or fluid.      Impression: There continues to be widespread metastatic disease in the liver. However there has been improvement in the size of the lesions when compared with 8/09/2017.  DICTATED:     10/20/2017 EDITED:          10/20/2017   This report was finalized on 10/21/2017 9:15 AM by Dr. Aaron Meadows MD.        ASSESSMENT: The patient is a very pleasant 75-year-old female with right breast  cancer.     PROBLEM LIST:  1. T2N1M0 invasive ductal carcinoma of the right breast, tumor greatest  dimension 2.5 cm, estrogen receptor strongly positive, progesterone receptor  intermediate, HER-2/jean marie negative by IHC score of 0, 2 out of 8 positive  axillary lymph nodes.   2. Status post right mastectomy with axillary lymph node dissection done by  Dr. Heart on 07/30/2012.   3. Status post 4 cycles of adjuvant chemotherapy using Taxotere and Cytoxan  from 10/08/2012 until 12/10/2012.   4. Took Arimidex 1 mg p.o. daily from 01/10/2013 until 04/28/2015.   5. Relapsed disease documented on CAT scan of the chest, abdomen, and pelvis  done 04/27/2015. Revealed right chest wall mass as well as right axillary  lymphadenopathy. Biopsy done 04/17/2015 revealed invasive ductal carcinoma.   6. Enrolled on [facility]  protocol, Aromasin with placebo versus Aromasin and  Entinostat, 05/08/2015.   7. Stopped Aromasin 04/25/2016 secondary to new liver metastases documented on  CAT scan 04/22/2016.   8. Completed adjuvant radiation treatment to the right chest wall 12/09/2015.  9. Status post right axillary dissection done by Dr. Heart 03/14/2016.   10.  Progressive disease documented on CAT scan done 04/22/2016 with new liver  metastases.   11. Started Faslodex Ibrance 05/02/2016.  12. Neutropenia secondary Ibrance. The patient is on 100 mg, 3 weeks on and 1  week off.   13. Rheumatoid arthritis.   14. Worsening metastatic disease documented on CT scans done on 07/25/2016.  15. Started on carboplatin AUC 2 with gemzar given two weeks on, one week off. She is status post three cycle of treatment.   16. Mixed response to treatment documented on CAT scans done 11/15/2016.  17. On gemzar single agent, status post 6 cycles.  18.  Progressive disease documented on CAT scan done February 4, 2017.  19.  Started weekly Adriamycin February 14, 2017.   20. Changed to maintenance Doxil after maximum response from weekly Adriamycin on 5/30/2017.   21. Progressive disease documented on CAT scan done on August 9, 2017  22. Started Halaven August 21, 2017, status post 3 cycle    PLAN:  1. We will proceed with treatment  using Halaven cycle 4 day 1.   2. She will continue use of EMLA cream prior to port access.  3. The patient will continue to hold her methotrexate as she is on active cancer treatment.  4. We will continue to monitor the patient's blood counts, kidney function, and liver function throughout treatment.   5. We will continue Norco 5/325 mg 1 tablet taken every 6 hours as needed for cancer-related pain.  I will go and refill it today.  She has not been needing this medication recently, but has it to be used if needed.   6.  I reviewed the CAT scan results with the patient. I reviewed the images myself. I told the patient she is having good response to treatment with improvement in liver lesions as well as axillary adenopathy. The patient will need repeat CAT scan after cycle # 6.   7.  We reviewed the side effects of this regimen including nausea, fatigue, myelosuppression, infusion reaction, peripheral neuropathy, hepatotoxicity, myelodysplasia, alopecia,  constipation or diarrhea, and potential death.  8.  The patient will follow up with me in 3 weeks for next cycle of treatment.     Scribed for France Chun MD by LIDIA Chirinos. 10/24/2017  3:45 PM  France Chun M.D.  10/24/2017     I, France Chun MD, personally performed the services described in this documentation as scribed by the above named individual in my presence, and it is both accurate and complete.  10/24/2017  3:45 PM

## 2017-11-14 NOTE — RESEARCH
Dr. Chun,   Ms. Cline needs H&P with [employer], and per protocol RTC in 6 months. Call me if you have any questions, [phone].   -Ali

## 2017-11-14 NOTE — PROGRESS NOTES
DATE OF VISIT: 11/14/2017    REASON FOR VISIT: Followup for   1. Breast cancer:   a) Initially presenting as stage IIB disease, T2N1M0, estrogen receptor  strongly positive, progesterone receptor intermediate, HER-2/jean marie negative.   b) Status post right mastectomy followed by adjuvant chemotherapy using  Taxotere and Cytoxan 4 cycles.   c) Took Arimidex 1 mg p.o. daily from 01/10/2013 until 04/28/2015.   2. Locally relapsed disease with right chest wall as well as axillary lymph  nodes biopsy proven metastatic disease done 04/17/2015.  3. Enrolled on clinical trial [employer]  randomizing patients for 2nd line  hormone treatment with Aromasin with placebo versus Aromasin with Entinostat.   4. Currently on Aromasin single agent.   5. Completed adjuvant radiation treatment by Dr. Benavides to the right chest  wall as well as right axillary lymph nodes 12/09/2015.   6. Currently metastatic disease with new liver metastases documented on CAT  scan 04/22/2016.   7. Started Faslodex Ibrance 05/02/2016.   8. Switched to Carbo/gemzar weekly secondary to progressive disease from 07/22/2016 till 10/11/2016.  9. Started gemzar single agent weekly 2 weeks on and 1 week off 10/25/2016.  10.  Started weekly Adriamycin single agent on February 14, 2017   11. Changed to Doxil single agent every 4 weeks on May 30, 2017.   12.  Progressive disease documented CAT scan done August 9, 2017  13.  Started Halaven in August 21, 2017    HISTORY OF PRESENT ILLNESS: The patient is a very pleasant 75-year-old female  with past medical history significant for invasive ductal carcinoma of the  right breast diagnosed 06/21/2012. The patient is status post right mastectomy  on 07/13/2012 with lymph node dissection, tumor greatest dimension was 2.5 cm,  2 out of 8 positive axial lymph nodes, clear surgical margins. The patient was  started on adjuvant chemotherapy using Taxotere and Cytoxan on 10/08/2012. The  patient completed her treatment on  12/10/2012. The patient was started on  adjuvant hormone treatment using Arimidex 1 mg p.o. daily 01/10/2013. The  patient presented with right chest wall mass. Biopsy done on 04/17/2015  revealed relapsed high-grade ductal carcinoma. She had CAT scan of the chest,  abdomen, and pelvis that revealed disease in the right chest wall as well as  right axilla. The patient was enrolled with [employer]CoG  Aromasin with or without  Entinostat on 05/08/2015. The patient had progressive disease documented on  scans done 04/22/2016 with liver metastases. The patient was started on  Faslodex Ibrance on 05/02/2016. Repeat CT scan done 7/22/2016 showed continued progression of disease, and the patient's treatment was changed to carbo/gemzar 2 weeks on, 1 week off. Completed 5 cycles on 10/11/2016. shw started Gemzar single agent 10/25/2016.  Repeated CAT scan done February 3, 2017 showed progressive liver metastases.  Patient was switched to weekly Adriamycin started on February 14, 2017. She had stable disease documented on CT scans done 5/26/2017 and was switched to Doxil given every 4 weeks.  Repeated scan done August 9, 2017 revealed progressive disease.  Patient was switched to Halaven August 21, 2017.  She is here today for cycle 5 day 1.      SUBJECTIVE: The patient has been doing fairly well.  Her appetite had improved and she gained couple of pounds over the last month.  She has had no further nausea or vomiting. She is using Miralax as needed for constipation. She has no new pain or discomfort.      PAST MEDICAL HISTORY/SOCIAL HISTORY/FAMILY HISTORY: Unchanged from my prior documentation done on 10/2/2012.     Review of Systems   Constitutional: Positive for fatigue. Negative for activity change, chills, fever and unexpected weight change.   HENT: Negative for hearing loss, mouth sores, nosebleeds, sore throat and trouble swallowing.    Eyes: Negative for visual disturbance.   Respiratory: Negative for chest tightness,  shortness of breath and wheezing.    Cardiovascular: Negative for chest pain, palpitations and leg swelling.   Gastrointestinal: Negative for abdominal distention, abdominal pain, blood in stool, constipation, diarrhea, nausea, rectal pain and vomiting.        Poor appetite   Endocrine: Negative for cold intolerance and heat intolerance.   Genitourinary: Negative for difficulty urinating, dysuria, frequency and urgency.   Musculoskeletal: Positive for arthralgias. Negative for back pain, gait problem and myalgias.   Skin: Negative for rash.   Neurological: Negative for dizziness, tremors, syncope, weakness, light-headedness, numbness and headaches.   Hematological: Negative for adenopathy. Does not bruise/bleed easily.   Psychiatric/Behavioral: Negative for confusion, sleep disturbance and suicidal ideas. The patient is not nervous/anxious.          Current Outpatient Prescriptions:   •  albuterol (VENTOLIN HFA) 108 (90 BASE) MCG/ACT inhaler, Inhale 2 puffs Every 6 (Six) Hours As Needed for Wheezing or Shortness of Air., Disp: 18 g, Rfl: 5  •  amLODIPine (NORVASC) 10 MG tablet, Take 10 mg by mouth daily., Disp: , Rfl:   •  fluticasone (FLONASE) 50 MCG/ACT nasal spray, 1 spray into each nostril Daily., Disp: 15.8 mL, Rfl: 0  •  HYDROcodone-acetaminophen (NORCO) 5-325 MG per tablet, Take 1 tablet by mouth Every 6 (Six) Hours As Needed for Moderate Pain ., Disp: 120 tablet, Rfl: 0  •  lidocaine-prilocaine (EMLA) 2.5-2.5 % cream, Apply  topically As Needed for mild pain (1-3) (prior to port access). (Patient taking differently: Apply 1 application topically As Needed for Mild Pain (1-3) (prior to port access).), Disp: 30 g, Rfl: 2  •  megestrol (MEGACE) 40 MG/ML suspension, Take 20 mL by mouth Daily., Disp: 480 mL, Rfl: 5  •  Multiple Vitamin (TAB-A-TESS) tablet, Take 1 tablet by mouth daily., Disp: , Rfl: 0  •  ondansetron (ZOFRAN) 8 MG tablet, Take 1 tablet by mouth 3 (Three) Times a Day As Needed for Nausea or  "Vomiting., Disp: 30 tablet, Rfl: 5  •  ondansetron ODT (ZOFRAN-ODT) 4 MG disintegrating tablet, dissolve 1 tablet under the tongue every 12 hours if needed before meals for nausea, Disp: , Rfl: 0  •  polyethylene glycol (MIRALAX) packet, Take 17 g by mouth Daily., Disp: 225 g, Rfl: 1  •  quinapril (ACCUPRIL) 5 MG tablet, , Disp: , Rfl: 0    PHYSICAL EXAMINATION:   /65  Pulse 98  Temp 98.7 °F (37.1 °C) (Temporal Artery )   Resp 16  Ht 64\" (162.6 cm)  Wt 146 lb 12.8 oz (66.6 kg)  BMI 25.2 kg/m2   ECOG Performance Status: 1  General Appearance:  alert, cooperative, no apparent distress and appears stated age   Neurologic/Psychiatric: A&O x 3, gait steady, appropriate affect, strength 5/5 in all muscle groups   HEENT:  Normocephalic, without obvious abnormality, mucous membranes moist   Neck: Supple, symmetrical, trachea midline, no adenopathy;  No thyromegaly, masses, or tenderness   Lungs:   Clear to auscultation bilaterally; respirations regular, even, and unlabored bilaterally   Heart:  Regular rate and rhythm, no murmurs appreciated   Abdomen:   Soft, non-tender, non-distended and no organomegaly   Lymph nodes: No cervical, supraclavicular, inguinal or axillary adenopathy noted   Extremities: Normal, atraumatic; no clubbing, cyanosis, or edema    Skin: No rashes, ulcers, or suspicious lesions noted     No visits with results within 2 Week(s) from this visit.  Latest known visit with results is:    Infusion on 10/31/2017   Component Date Value Ref Range Status   • Glucose 10/31/2017 78  70 - 100 mg/dL Final   • BUN 10/31/2017 19  9 - 23 mg/dL Final   • Creatinine 10/31/2017 0.90  0.60 - 1.30 mg/dL Final   • Sodium 10/31/2017 138  132 - 146 mmol/L Final   • Potassium 10/31/2017 4.2  3.5 - 5.5 mmol/L Final   • Chloride 10/31/2017 102  99 - 109 mmol/L Final   • CO2 10/31/2017 25.0  20.0 - 31.0 mmol/L Final   • Calcium 10/31/2017 9.0  8.7 - 10.4 mg/dL Final   • eGFR  African Amer 10/31/2017 73  >60 " mL/min/1.73 Final   • BUN/Creatinine Ratio 10/31/2017 21.1  7.0 - 25.0 Final   • Anion Gap 10/31/2017 11.0  3.0 - 11.0 mmol/L Final   • Magnesium 10/31/2017 1.9  1.3 - 2.7 mg/dL Final   • WBC 10/31/2017 4.30  3.50 - 10.80 10*3/mm3 Final   • RBC 10/31/2017 3.41* 3.89 - 5.14 10*6/mm3 Final   • Hemoglobin 10/31/2017 9.7* 11.5 - 15.5 g/dL Final   • Hematocrit 10/31/2017 31.1* 34.5 - 44.0 % Final   • RDW 10/31/2017 17.6* 11.3 - 14.5 % Final   • MCV 10/31/2017 91.3  80.0 - 99.0 fL Final   • MCH 10/31/2017 28.4  27.0 - 31.0 pg Final   • MCHC 10/31/2017 31.1* 32.0 - 36.0 g/dL Final   • MPV 10/31/2017 7.2  6.0 - 12.0 fL Final   • Platelets 10/31/2017 328  150 - 450 10*3/mm3 Final   • Neutrophil % 10/31/2017 53.3  41.0 - 71.0 % Final   • Lymphocyte % 10/31/2017 40.9  24.0 - 44.0 % Final   • Monocyte % 10/31/2017 5.8  0.0 - 12.0 % Final   • Neutrophils, Absolute 10/31/2017 2.30  1.50 - 8.30 10*3/mm3 Final   • Lymphocytes, Absolute 10/31/2017 1.80  0.60 - 4.80 10*3/mm3 Final   • Monocytes, Absolute 10/31/2017 0.20  0.00 - 1.00 10*3/mm3 Final   • Creatinine 10/31/2017 1.10  0.60 - 1.30 mg/dL Final    Serial Number: [id number]:  [id number]        Ct Chest With Contrast    Result Date: 10/21/2017  Narrative: EXAMINATION: CT CHEST W CONTRAST - 10/20/2017  INDICATION: C50.111-Malignant neoplasm of central portion of right female breast; Z17.1-Estrogen receptor negative status (ER).  TECHNIQUE: CT scan of the chest was performed following intravenous contrast.  The radiation dose reduction device was turned on for each scan per the ALARA (As Low as Reasonably Achievable) protocol.  COMPARISON: 8/09/2017.  FINDINGS: There is no axillary lymphadenopathy. There is a small seroma in the lateral aspect of the right breast which is unchanged. There is no mediastinal or hilar lymphadenopathy. There is no pericardial or pleural effusion. Images displayed at lung window settings reveal no acute inflammatory process. The area of plaque-like  nodularity abutting the leftward aspect of the anterior mediastinum is smaller than on the previous examination. There has otherwise been no significant change.      Impression: There has been interval improvement when compared with 8/09/2017. Specifically the left axillary lymph nodes are now of normal size and the left upper lobe pulmonary mass is significantly smaller than on the previous exam.  DICTATED:     10/20/2017 EDITED:          10/20/2017      This report was finalized on 10/21/2017 9:15 AM by Dr. Aaron Meadows MD.      Ct Abdomen Pelvis With Contrast    Result Date: 10/21/2017  Narrative: EXAMINATION: CT ABDOMEN PELVIS W/CONTRAST - 10/20/2017  INDICATION: C50.111-Malignant neoplasm of central portion of right female breast; Z17.1-Estrogen receptor negative status (ER-).  TECHNIQUE: CT scan of abdomen pelvis performed following intravenous contrast.  The radiation dose reduction device was turned on for each scan per the ALARA (As Low as Reasonably Achievable) protocol.  COMPARISON: 8/09/.  FINDINGS: There are several low dense lesions in the liver consistent with extensive metastatic disease. Nevertheless these abnormalities have shown interval improvement. The spleen is normal. There is no adrenal or pancreatic mass. There is a simple cyst in the right kidney. There is no solid renal mass, stone or obstruction. There is no ascites, aneurysm or retroperitoneal lymphadenopathy. There is no pelvic mass or fluid.      Impression: There continues to be widespread metastatic disease in the liver. However there has been improvement in the size of the lesions when compared with 8/09/2017.  DICTATED:     10/20/2017 EDITED:          10/20/2017   This report was finalized on 10/21/2017 9:15 AM by Dr. Aaron Meadows MD.        ASSESSMENT: The patient is a very pleasant 75-year-old female with right breast  cancer.     PROBLEM LIST:  1. T2N1M0 invasive ductal carcinoma of the right breast, tumor greatest  dimension  2.5 cm, estrogen receptor strongly positive, progesterone receptor  intermediate, HER-2/jean marie negative by IHC score of 0, 2 out of 8 positive  axillary lymph nodes.   2. Status post right mastectomy with axillary lymph node dissection done by  Dr. Heart on 07/30/2012.   3. Status post 4 cycles of adjuvant chemotherapy using Taxotere and Cytoxan  from 10/08/2012 until 12/10/2012.   4. Took Arimidex 1 mg p.o. daily from 01/10/2013 until 04/28/2015.   5. Relapsed disease documented on CAT scan of the chest, abdomen, and pelvis  done 04/27/2015. Revealed right chest wall mass as well as right axillary  lymphadenopathy. Biopsy done 04/17/2015 revealed invasive ductal carcinoma.   6. Enrolled on [employer]CoG  protocol, Aromasin with placebo versus Aromasin and  Entinostat, 05/08/2015.   7. Stopped Aromasin 04/25/2016 secondary to new liver metastases documented on  CAT scan 04/22/2016.   8. Completed adjuvant radiation treatment to the right chest wall 12/09/2015.  9. Status post right axillary dissection done by Dr. Heart 03/14/2016.   10. Progressive disease documented on CAT scan done 04/22/2016 with new liver  metastases.   11. Started Faslodex Ibrance 05/02/2016.  12. Neutropenia secondary Ibrance. The patient is on 100 mg, 3 weeks on and 1  week off.   13. Rheumatoid arthritis.   14. Worsening metastatic disease documented on CT scans done on 07/25/2016.  15. Started on carboplatin AUC 2 with gemzar given two weeks on, one week off. She is status post three cycle of treatment.   16. Mixed response to treatment documented on CAT scans done 11/15/2016.  17. On gemzar single agent, status post 6 cycles.  18.  Progressive disease documented on CAT scan done February 4, 2017.  19.  Started weekly Adriamycin February 14, 2017.   20. Changed to maintenance Doxil after maximum response from weekly Adriamycin on 5/30/2017.   21. Progressive disease documented on CAT scan done on August 9, 2017  22. Started Halaven August 21, 2017,  status post 4 cycle    PLAN:  1. We will proceed with treatment  using Halaven cycle 5 day 1.   2. She will continue use of EMLA cream prior to port access.  3. The patient will continue to hold her methotrexate as she is on active cancer treatment.  4. We will continue to monitor the patient's blood counts, kidney function, and liver function throughout treatment.   5. We will continue Norco 5/325 mg 1 tablet taken every 6 hours as needed for cancer-related pain.  I will go and refill it today.  She has not been needing this medication recently, but has it to be used if needed.   6. The patient will need repeat CAT scan after cycle # 6.   7.  We reviewed the side effects of this regimen including nausea, fatigue, myelosuppression, infusion reaction, peripheral neuropathy, hepatotoxicity, myelodysplasia, alopecia, constipation or diarrhea, and potential death.  8.  The patient will follow up with me in 3 weeks for next cycle of treatment.     Scribed for France Chun MD by LIDIA Chirinos. 11/14/2017  1:51 PM  France Chun M.D.  11/14/2017   I, France Chun MD, personally performed the services described in this documentation as scribed by the above named individual in my presence, and it is both accurate and complete.  11/17/2017  9:43 AM

## 2017-12-04 NOTE — PROGRESS NOTES
DATE OF VISIT: 12/4/2017    REASON FOR VISIT: Followup for   1. Breast cancer:   a) Initially presenting as stage IIB disease, T2N1M0, estrogen receptor  strongly positive, progesterone receptor intermediate, HER-2/jean marie negative.   b) Status post right mastectomy followed by adjuvant chemotherapy using  Taxotere and Cytoxan 4 cycles.   c) Took Arimidex 1 mg p.o. daily from 01/10/2013 until 04/28/2015.   2. Locally relapsed disease with right chest wall as well as axillary lymph  nodes biopsy proven metastatic disease done 04/17/2015.  3. Enrolled on clinical trial [employer]  randomizing patients for 2nd line  hormone treatment with Aromasin with placebo versus Aromasin with Entinostat.   4. Currently on Aromasin single agent.   5. Completed adjuvant radiation treatment by Dr. Benavides to the right chest  wall as well as right axillary lymph nodes 12/09/2015.   6. Currently metastatic disease with new liver metastases documented on CAT  scan 04/22/2016.   7. Started Faslodex Ibrance 05/02/2016.   8. Switched to Carbo/gemzar weekly secondary to progressive disease from 07/22/2016 till 10/11/2016.  9. Started gemzar single agent weekly 2 weeks on and 1 week off 10/25/2016.  10.  Started weekly Adriamycin single agent on February 14, 2017   11. Changed to Doxil single agent every 4 weeks on May 30, 2017.   12.  Progressive disease documented CAT scan done August 9, 2017  13.  Started Halaven in August 21, 2017    HISTORY OF PRESENT ILLNESS: The patient is a very pleasant 75-year-old female  with past medical history significant for invasive ductal carcinoma of the  right breast diagnosed 06/21/2012. The patient is status post right mastectomy  on 07/13/2012 with lymph node dissection, tumor greatest dimension was 2.5 cm,  2 out of 8 positive axial lymph nodes, clear surgical margins. The patient was  started on adjuvant chemotherapy using Taxotere and Cytoxan on 10/08/2012. The  patient completed her treatment on  12/10/2012. The patient was started on  adjuvant hormone treatment using Arimidex 1 mg p.o. daily 01/10/2013. The  patient presented with right chest wall mass. Biopsy done on 04/17/2015  revealed relapsed high-grade ductal carcinoma. She had CAT scan of the chest,  abdomen, and pelvis that revealed disease in the right chest wall as well as  right axilla. The patient was enrolled with [employer]  Aromasin with or without  Entinostat on 05/08/2015. The patient had progressive disease documented on  scans done 04/22/2016 with liver metastases. The patient was started on  Faslodex Ibrance on 05/02/2016. Repeat CT scan done 7/22/2016 showed continued progression of disease, and the patient's treatment was changed to carbo/gemzar 2 weeks on, 1 week off. Completed 5 cycles on 10/11/2016. shw started Gemzar single agent 10/25/2016.  Repeated CAT scan done February 3, 2017 showed progressive liver metastases.  Patient was switched to weekly Adriamycin started on February 14, 2017. She had stable disease documented on CT scans done 5/26/2017 and was switched to Doxil given every 4 weeks.  Repeated scan done August 9, 2017 revealed progressive disease.  Patient was switched to Halaven August 21, 2017.  She is here today for cycle 5 day 8.      SUBJECTIVE: The patient has been doing fairly well.  Her appetite had improved and she gained couple of pounds over the last month.  She has had no further nausea or vomiting. She is using Miralax as needed for constipation. She has no new pain or discomfort.      PAST MEDICAL HISTORY/SOCIAL HISTORY/FAMILY HISTORY: Unchanged from my prior documentation done on 10/2/2012.     Review of Systems   Constitutional: Positive for fatigue. Negative for activity change, chills, fever and unexpected weight change.   HENT: Negative for hearing loss, mouth sores, nosebleeds, sore throat and trouble swallowing.    Eyes: Negative for visual disturbance.   Respiratory: Negative for chest tightness,  shortness of breath and wheezing.    Cardiovascular: Negative for chest pain, palpitations and leg swelling.   Gastrointestinal: Negative for abdominal distention, abdominal pain, blood in stool, constipation, diarrhea, nausea, rectal pain and vomiting.        Poor appetite   Endocrine: Negative for cold intolerance and heat intolerance.   Genitourinary: Negative for difficulty urinating, dysuria, frequency and urgency.   Musculoskeletal: Positive for arthralgias. Negative for back pain, gait problem and myalgias.   Skin: Negative for rash.   Neurological: Negative for dizziness, tremors, syncope, weakness, light-headedness, numbness and headaches.   Hematological: Negative for adenopathy. Does not bruise/bleed easily.   Psychiatric/Behavioral: Negative for confusion, sleep disturbance and suicidal ideas. The patient is not nervous/anxious.          Current Outpatient Prescriptions:   •  albuterol (VENTOLIN HFA) 108 (90 BASE) MCG/ACT inhaler, Inhale 2 puffs Every 6 (Six) Hours As Needed for Wheezing or Shortness of Air., Disp: 18 g, Rfl: 5  •  amLODIPine (NORVASC) 10 MG tablet, Take 10 mg by mouth daily., Disp: , Rfl:   •  fluticasone (FLONASE) 50 MCG/ACT nasal spray, 1 spray into each nostril Daily., Disp: 15.8 mL, Rfl: 0  •  HYDROcodone-acetaminophen (NORCO) 5-325 MG per tablet, Take 1 tablet by mouth Every 6 (Six) Hours As Needed for Moderate Pain ., Disp: 120 tablet, Rfl: 0  •  lidocaine-prilocaine (EMLA) 2.5-2.5 % cream, Apply  topically As Needed for mild pain (1-3) (prior to port access). (Patient taking differently: Apply 1 application topically As Needed for Mild Pain (1-3) (prior to port access).), Disp: 30 g, Rfl: 2  •  megestrol (MEGACE) 40 MG/ML suspension, Take 20 mL by mouth Daily., Disp: 480 mL, Rfl: 5  •  Multiple Vitamin (TAB-A-TESS) tablet, Take 1 tablet by mouth daily., Disp: , Rfl: 0  •  ondansetron (ZOFRAN) 8 MG tablet, Take 1 tablet by mouth 3 (Three) Times a Day As Needed for Nausea or  Vomiting., Disp: 30 tablet, Rfl: 5  •  ondansetron ODT (ZOFRAN-ODT) 4 MG disintegrating tablet, dissolve 1 tablet under the tongue every 12 hours if needed before meals for nausea, Disp: , Rfl: 0  •  polyethylene glycol (MIRALAX) packet, Take 17 g by mouth Daily., Disp: 225 g, Rfl: 1  •  quinapril (ACCUPRIL) 5 MG tablet, , Disp: , Rfl: 0    PHYSICAL EXAMINATION:   There were no vitals taken for this visit.   ECOG Performance Status: 1  General Appearance:  alert, cooperative, no apparent distress and appears stated age   Neurologic/Psychiatric: A&O x 3, gait steady, appropriate affect, strength 5/5 in all muscle groups   HEENT:  Normocephalic, without obvious abnormality, mucous membranes moist   Neck: Supple, symmetrical, trachea midline, no adenopathy;  No thyromegaly, masses, or tenderness   Lungs:   Clear to auscultation bilaterally; respirations regular, even, and unlabored bilaterally   Heart:  Regular rate and rhythm, no murmurs appreciated   Abdomen:   Soft, non-tender, non-distended and no organomegaly   Lymph nodes: No cervical, supraclavicular, inguinal or axillary adenopathy noted   Extremities: Normal, atraumatic; no clubbing, cyanosis, or edema    Skin: No rashes, ulcers, or suspicious lesions noted     No visits with results within 2 Week(s) from this visit.  Latest known visit with results is:    Infusion on 11/14/2017   Component Date Value Ref Range Status   • Glucose 11/14/2017 80  70 - 100 mg/dL Final   • BUN 11/14/2017 15  9 - 23 mg/dL Final   • Creatinine 11/14/2017 0.80  0.60 - 1.30 mg/dL Final   • Sodium 11/14/2017 136  132 - 146 mmol/L Final   • Potassium 11/14/2017 4.0  3.5 - 5.5 mmol/L Final   • Chloride 11/14/2017 101  99 - 109 mmol/L Final   • CO2 11/14/2017 30.0  20.0 - 31.0 mmol/L Final   • Calcium 11/14/2017 8.9  8.7 - 10.4 mg/dL Final   • Total Protein 11/14/2017 6.8  5.7 - 8.2 g/dL Final   • Albumin 11/14/2017 3.60  3.20 - 4.80 g/dL Final   • ALT (SGPT) 11/14/2017 15  7 - 40 U/L  Final   • AST (SGOT) 11/14/2017 31  0 - 33 U/L Final   • Alkaline Phosphatase 11/14/2017 102* 25 - 100 U/L Final   • Total Bilirubin 11/14/2017 0.4  0.3 - 1.2 mg/dL Final   • eGFR   Amer 11/14/2017 84  >60 mL/min/1.73 Final   • Globulin 11/14/2017 3.2  gm/dL Final   • A/G Ratio 11/14/2017 1.1* 1.5 - 2.5 g/dL Final   • BUN/Creatinine Ratio 11/14/2017 18.8  7.0 - 25.0 Final   • Anion Gap 11/14/2017 5.0  3.0 - 11.0 mmol/L Final   • Magnesium 11/14/2017 1.8  1.3 - 2.7 mg/dL Final   • WBC 11/14/2017 7.00  3.50 - 10.80 10*3/mm3 Final   • RBC 11/14/2017 3.33* 3.89 - 5.14 10*6/mm3 Final   • Hemoglobin 11/14/2017 9.4* 11.5 - 15.5 g/dL Final   • Hematocrit 11/14/2017 30.5* 34.5 - 44.0 % Final   • RDW 11/14/2017 20.0* 11.3 - 14.5 % Final   • MCV 11/14/2017 91.4  80.0 - 99.0 fL Final   • MCH 11/14/2017 28.1  27.0 - 31.0 pg Final   • MCHC 11/14/2017 30.8* 32.0 - 36.0 g/dL Final   • MPV 11/14/2017 6.9  6.0 - 12.0 fL Final   • Platelets 11/14/2017 383  150 - 450 10*3/mm3 Final   • Neutrophil % 11/14/2017 66.5  41.0 - 71.0 % Final   • Lymphocyte % 11/14/2017 28.3  24.0 - 44.0 % Final   • Monocyte % 11/14/2017 5.2  0.0 - 12.0 % Final   • Neutrophils, Absolute 11/14/2017 4.70  1.50 - 8.30 10*3/mm3 Final   • Lymphocytes, Absolute 11/14/2017 2.00  0.60 - 4.80 10*3/mm3 Final   • Monocytes, Absolute 11/14/2017 0.40  0.00 - 1.00 10*3/mm3 Final   • Creatinine 11/14/2017 0.90  0.60 - 1.30 mg/dL Final    Serial Number: [id number]:  [id number]        No results found.    ASSESSMENT: The patient is a very pleasant 75-year-old female with right breast  cancer.     PROBLEM LIST:  1. T2N1M0 invasive ductal carcinoma of the right breast, tumor greatest  dimension 2.5 cm, estrogen receptor strongly positive, progesterone receptor  intermediate, HER-2/jean marie negative by IHC score of 0, 2 out of 8 positive  axillary lymph nodes.   2. Status post right mastectomy with axillary lymph node dissection done by  Dr. Heart on 07/30/2012.   3. Status post  4 cycles of adjuvant chemotherapy using Taxotere and Cytoxan  from 10/08/2012 until 12/10/2012.   4. Took Arimidex 1 mg p.o. daily from 01/10/2013 until 04/28/2015.   5. Relapsed disease documented on CAT scan of the chest, abdomen, and pelvis  done 04/27/2015. Revealed right chest wall mass as well as right axillary  lymphadenopathy. Biopsy done 04/17/2015 revealed invasive ductal carcinoma.   6. Enrolled on [employer]  protocol, Aromasin with placebo versus Aromasin and  Entinostat, 05/08/2015.   7. Stopped Aromasin 04/25/2016 secondary to new liver metastases documented on  CAT scan 04/22/2016.   8. Completed adjuvant radiation treatment to the right chest wall 12/09/2015.  9. Status post right axillary dissection done by Dr. Heart 03/14/2016.   10. Progressive disease documented on CAT scan done 04/22/2016 with new liver  metastases.   11. Started Faslodex Ibrance 05/02/2016.  12. Neutropenia secondary Ibrance. The patient is on 100 mg, 3 weeks on and 1  week off.   13. Rheumatoid arthritis.   14. Worsening metastatic disease documented on CT scans done on 07/25/2016.  15. Started on carboplatin AUC 2 with gemzar given two weeks on, one week off. She is status post three cycle of treatment.   16. Mixed response to treatment documented on CAT scans done 11/15/2016.  17. On gemzar single agent, status post 6 cycles.  18.  Progressive disease documented on CAT scan done February 4, 2017.  19.  Started weekly Adriamycin February 14, 2017.   20. Changed to maintenance Doxil after maximum response from weekly Adriamycin on 5/30/2017.   21. Progressive disease documented on CAT scan done on August 9, 2017  22. Started Halaven August 21, 2017, status post 4 cycle    PLAN:  1. We will proceed with treatment  using Halaven cycle 5 day 8.   2. She will continue use of EMLA cream prior to port access.  3. The patient will continue to hold her methotrexate as she is on active cancer treatment.  4. We will continue to monitor  the patient's blood counts, kidney function, and liver function throughout treatment.   5. We will continue Norco 5/325 mg 1 tablet taken every 6 hours as needed for cancer-related pain.  I will go and refill it today.  She has not been needing this medication recently, but has it to be used if needed.   6. The patient will need repeat CAT scan after cycle # 6.   7.  We reviewed the side effects of this regimen including nausea, fatigue, myelosuppression, infusion reaction, peripheral neuropathy, hepatotoxicity, myelodysplasia, alopecia, constipation or diarrhea, and potential death.  8.  The patient will follow up with me in 2 weeks for next cycle of treatment.     Scribed for France Chun MD by LIDIA Chirinos. 12/4/2017  12:27 PM  France Chun M.D.  12/4/2017   IFrance MD, personally performed the services described in this documentation as scribed by the above named individual in my presence, and it is both accurate and complete.  12/4/2017  12:27 PM

## 2017-12-19 NOTE — PROGRESS NOTES
DATE OF VISIT: 12/19/2017    REASON FOR VISIT: Followup for   1. Breast cancer:   a) Initially presenting as stage IIB disease, T2N1M0, estrogen receptor  strongly positive, progesterone receptor intermediate, HER-2/jean marie negative.   b) Status post right mastectomy followed by adjuvant chemotherapy using  Taxotere and Cytoxan 4 cycles.   c) Took Arimidex 1 mg p.o. daily from 01/10/2013 until 04/28/2015.   2. Locally relapsed disease with right chest wall as well as axillary lymph  nodes biopsy proven metastatic disease done 04/17/2015.  3. Enrolled on clinical trial [employer]  randomizing patients for 2nd line  hormone treatment with Aromasin with placebo versus Aromasin with Entinostat.   4. Currently on Aromasin single agent.   5. Completed adjuvant radiation treatment by Dr. Benavides to the right chest  wall as well as right axillary lymph nodes 12/09/2015.   6. Currently metastatic disease with new liver metastases documented on CAT  scan 04/22/2016.   7. Started Faslodex Ibrance 05/02/2016.   8. Switched to Carbo/gemzar weekly secondary to progressive disease from 07/22/2016 till 10/11/2016.  9. Started gemzar single agent weekly 2 weeks on and 1 week off 10/25/2016.  10.  Started weekly Adriamycin single agent on February 14, 2017   11. Changed to Doxil single agent every 4 weeks on May 30, 2017.   12.  Progressive disease documented CAT scan done August 9, 2017  13.  Started Halaven in August 21, 2017    HISTORY OF PRESENT ILLNESS: The patient is a very pleasant 75-year-old female  with past medical history significant for invasive ductal carcinoma of the  right breast diagnosed 06/21/2012. The patient is status post right mastectomy  on 07/13/2012 with lymph node dissection, tumor greatest dimension was 2.5 cm,  2 out of 8 positive axial lymph nodes, clear surgical margins. The patient was  started on adjuvant chemotherapy using Taxotere and Cytoxan on 10/08/2012. The  patient completed her treatment on  12/10/2012. The patient was started on  adjuvant hormone treatment using Arimidex 1 mg p.o. daily 01/10/2013. The  patient presented with right chest wall mass. Biopsy done on 04/17/2015  revealed relapsed high-grade ductal carcinoma. She had CAT scan of the chest,  abdomen, and pelvis that revealed disease in the right chest wall as well as  right axilla. The patient was enrolled with [employer]  Aromasin with or without  Entinostat on 05/08/2015. The patient had progressive disease documented on  scans done 04/22/2016 with liver metastases. The patient was started on  Faslodex Ibrance on 05/02/2016. Repeat CT scan done 7/22/2016 showed continued progression of disease, and the patient's treatment was changed to carbo/gemzar 2 weeks on, 1 week off. Completed 5 cycles on 10/11/2016. shw started Gemzar single agent 10/25/2016.  Repeated CAT scan done February 3, 2017 showed progressive liver metastases.  Patient was switched to weekly Adriamycin started on February 14, 2017. She had stable disease documented on CT scans done 5/26/2017 and was switched to Doxil given every 4 weeks.  Repeated scan done August 9, 2017 revealed progressive disease.  Patient was switched to Halaven August 21, 2017.  She is here today for cycle 6 day 1.      SUBJECTIVE: The patient is complaining of productive cough.  She's been having a little congestion.  She was started recently on Levaquin by her primary care provider.  She is complaining of fatigue she is requesting a treatment break.    PAST MEDICAL HISTORY/SOCIAL HISTORY/FAMILY HISTORY: Unchanged from my prior documentation done on 10/2/2012.     Review of Systems   Constitutional: Positive for fatigue. Negative for activity change, chills, fever and unexpected weight change.   HENT: Negative for hearing loss, mouth sores, nosebleeds, sore throat and trouble swallowing.    Eyes: Negative for visual disturbance.   Respiratory: Positive for cough. Negative for chest tightness,  shortness of breath and wheezing.    Cardiovascular: Negative for chest pain, palpitations and leg swelling.   Gastrointestinal: Negative for abdominal distention, abdominal pain, blood in stool, constipation, diarrhea, nausea, rectal pain and vomiting.        Poor appetite   Endocrine: Negative for cold intolerance and heat intolerance.   Genitourinary: Negative for difficulty urinating, dysuria, frequency and urgency.   Musculoskeletal: Positive for arthralgias. Negative for back pain, gait problem and myalgias.   Skin: Negative for rash.   Neurological: Positive for weakness. Negative for dizziness, tremors, syncope, light-headedness, numbness and headaches.   Hematological: Negative for adenopathy. Does not bruise/bleed easily.   Psychiatric/Behavioral: Negative for confusion, sleep disturbance and suicidal ideas. The patient is not nervous/anxious.          Current Outpatient Prescriptions:   •  albuterol (VENTOLIN HFA) 108 (90 BASE) MCG/ACT inhaler, Inhale 2 puffs Every 6 (Six) Hours As Needed for Wheezing or Shortness of Air., Disp: 18 g, Rfl: 5  •  amLODIPine (NORVASC) 10 MG tablet, Take 10 mg by mouth daily., Disp: , Rfl:   •  fluticasone (FLONASE) 50 MCG/ACT nasal spray, 1 spray into each nostril Daily., Disp: 15.8 mL, Rfl: 0  •  HYDROcodone-acetaminophen (NORCO) 5-325 MG per tablet, Take 1 tablet by mouth Every 6 (Six) Hours As Needed for Moderate Pain ., Disp: 120 tablet, Rfl: 0  •  lidocaine-prilocaine (EMLA) 2.5-2.5 % cream, Apply  topically As Needed for mild pain (1-3) (prior to port access). (Patient taking differently: Apply 1 application topically As Needed for Mild Pain (1-3) (prior to port access).), Disp: 30 g, Rfl: 2  •  megestrol (MEGACE) 40 MG/ML suspension, Take 20 mL by mouth Daily., Disp: 480 mL, Rfl: 5  •  Multiple Vitamin (TAB-A-TESS) tablet, Take 1 tablet by mouth daily., Disp: , Rfl: 0  •  ondansetron (ZOFRAN) 8 MG tablet, Take 1 tablet by mouth 3 (Three) Times a Day As Needed for  "Nausea or Vomiting., Disp: 30 tablet, Rfl: 5  •  ondansetron ODT (ZOFRAN-ODT) 4 MG disintegrating tablet, dissolve 1 tablet under the tongue every 12 hours if needed before meals for nausea, Disp: , Rfl: 0  •  polyethylene glycol (MIRALAX) packet, Take 17 g by mouth Daily., Disp: 225 g, Rfl: 1  •  promethazine (PHENERGAN) 25 MG tablet, take 1 tablet by mouth every 12 hours before meals if needed, Disp: , Rfl: 0  •  quinapril (ACCUPRIL) 5 MG tablet, , Disp: , Rfl: 0    PHYSICAL EXAMINATION:   /83  Pulse 107  Temp 99.1 °F (37.3 °C)  Resp 18  Ht 162.6 cm (64\")  Wt 63.5 kg (140 lb)  BMI 24.03 kg/m2   ECOG Performance Status: 1  General Appearance:  alert, cooperative, no apparent distress and appears stated age   Neurologic/Psychiatric: A&O x 3, gait steady, appropriate affect, strength 5/5 in all muscle groups   HEENT:  Normocephalic, without obvious abnormality, mucous membranes moist   Neck: Supple, symmetrical, trachea midline, no adenopathy;  No thyromegaly, masses, or tenderness   Lungs:   Clear to auscultation bilaterally; respirations regular, even, and unlabored bilaterally   Heart:  Regular rate and rhythm, no murmurs appreciated   Abdomen:   Soft, non-tender, non-distended and no organomegaly   Lymph nodes: No cervical, supraclavicular, inguinal or axillary adenopathy noted   Extremities: Normal, atraumatic; no clubbing, cyanosis, or edema    Skin: No rashes, ulcers, or suspicious lesions noted     No visits with results within 2 Week(s) from this visit.  Latest known visit with results is:    Infusion on 12/05/2017   Component Date Value Ref Range Status   • Glucose 12/05/2017 91  70 - 100 mg/dL Final   • BUN 12/05/2017 15  9 - 23 mg/dL Final   • Creatinine 12/05/2017 1.00  0.60 - 1.30 mg/dL Final   • Sodium 12/05/2017 142  132 - 146 mmol/L Final   • Potassium 12/05/2017 3.6  3.5 - 5.5 mmol/L Final   • Chloride 12/05/2017 105  99 - 109 mmol/L Final   • CO2 12/05/2017 28.0  20.0 - 31.0 mmol/L " Final   • Calcium 12/05/2017 8.8  8.7 - 10.4 mg/dL Final   • Total Protein 12/05/2017 6.8  5.7 - 8.2 g/dL Final   • Albumin 12/05/2017 3.70  3.20 - 4.80 g/dL Final   • ALT (SGPT) 12/05/2017 27  7 - 40 U/L Final   • AST (SGOT) 12/05/2017 54* 0 - 33 U/L Final   • Alkaline Phosphatase 12/05/2017 91  25 - 100 U/L Final   • Total Bilirubin 12/05/2017 0.3  0.3 - 1.2 mg/dL Final   • eGFR   Amer 12/05/2017 65  >60 mL/min/1.73 Final   • Globulin 12/05/2017 3.1  gm/dL Final   • A/G Ratio 12/05/2017 1.2* 1.5 - 2.5 g/dL Final   • BUN/Creatinine Ratio 12/05/2017 15.0  7.0 - 25.0 Final   • Anion Gap 12/05/2017 9.0  3.0 - 11.0 mmol/L Final   • Magnesium 12/05/2017 1.8  1.3 - 2.7 mg/dL Final   • WBC 12/05/2017 6.00  3.50 - 10.80 10*3/mm3 Final   • RBC 12/05/2017 3.64* 3.89 - 5.14 10*6/mm3 Final   • Hemoglobin 12/05/2017 10.3* 11.5 - 15.5 g/dL Final   • Hematocrit 12/05/2017 33.2* 34.5 - 44.0 % Final   • RDW 12/05/2017 21.9* 11.3 - 14.5 % Final   • MCV 12/05/2017 91.2  80.0 - 99.0 fL Final   • MCH 12/05/2017 28.4  27.0 - 31.0 pg Final   • MCHC 12/05/2017 31.1* 32.0 - 36.0 g/dL Final   • MPV 12/05/2017 7.0  6.0 - 12.0 fL Final   • Platelets 12/05/2017 332  150 - 450 10*3/mm3 Final   • Neutrophil % 12/05/2017 63.0  41.0 - 71.0 % Final   • Lymphocyte % 12/05/2017 32.3  24.0 - 44.0 % Final   • Monocyte % 12/05/2017 4.7  0.0 - 12.0 % Final   • Neutrophils, Absolute 12/05/2017 3.80  1.50 - 8.30 10*3/mm3 Final   • Lymphocytes, Absolute 12/05/2017 1.90  0.60 - 4.80 10*3/mm3 Final   • Monocytes, Absolute 12/05/2017 0.30  0.00 - 1.00 10*3/mm3 Final   • Creatinine 12/05/2017 1.20  0.60 - 1.30 mg/dL Final    Serial Number: [id number]:  [id number]        No results found.    ASSESSMENT: The patient is a very pleasant 75-year-old female with right breast  cancer.     PROBLEM LIST:  1. T2N1M0 invasive ductal carcinoma of the right breast, tumor greatest  dimension 2.5 cm, estrogen receptor strongly positive, progesterone  receptor  intermediate, HER-2/jean marie negative by IHC score of 0, 2 out of 8 positive  axillary lymph nodes.   2. Status post right mastectomy with axillary lymph node dissection done by  Dr. Heart on 07/30/2012.   3. Status post 4 cycles of adjuvant chemotherapy using Taxotere and Cytoxan  from 10/08/2012 until 12/10/2012.   4. Took Arimidex 1 mg p.o. daily from 01/10/2013 until 04/28/2015.   5. Relapsed disease documented on CAT scan of the chest, abdomen, and pelvis  done 04/27/2015. Revealed right chest wall mass as well as right axillary  lymphadenopathy. Biopsy done 04/17/2015 revealed invasive ductal carcinoma.   6. Enrolled on [employer]  protocol, Aromasin with placebo versus Aromasin and  Entinostat, 05/08/2015.   7. Stopped Aromasin 04/25/2016 secondary to new liver metastases documented on  CAT scan 04/22/2016.   8. Completed adjuvant radiation treatment to the right chest wall 12/09/2015.  9. Status post right axillary dissection done by Dr. Heart 03/14/2016.   10. Progressive disease documented on CAT scan done 04/22/2016 with new liver  metastases.   11. Started Faslodex Ibrance 05/02/2016.  12. Neutropenia secondary Ibrance. The patient is on 100 mg, 3 weeks on and 1  week off.   13. Rheumatoid arthritis.   14. Worsening metastatic disease documented on CT scans done on 07/25/2016.  15. Started on carboplatin AUC 2 with gemzar given two weeks on, one week off. She is status post three cycle of treatment.   16. Mixed response to treatment documented on CAT scans done 11/15/2016.  17. On gemzar single agent, status post 6 cycles.  18.  Progressive disease documented on CAT scan done February 4, 2017.  19.  Started weekly Adriamycin February 14, 2017.   20. Changed to maintenance Doxil after maximum response from weekly Adriamycin on 5/30/2017.   21. Progressive disease documented on CAT scan done on August 9, 2017  22. Started Halaven August 21, 2017, status post 5 cycle  23. Acute bronchitis.    PLAN:  1. We  will delay treatment by one week secondary to current upper respiratory infection and weakness.   2. She will come back in one week for cycle # 6 day 1 of Halaven.   3. She will continue use of EMLA cream prior to port access.  4. The patient will continue to hold her methotrexate as she is on active cancer treatment.  5. We will continue to monitor the patient's blood counts, kidney function, and liver function throughout treatment.   6. We will continue Norco 5/325 mg 1 tablet taken every 6 hours as needed for cancer-related pain.  I will go and refill it today.  She has not been needing this medication recently, but has it to be used if needed.   7. The patient will need repeat CAT scan after cycle # 6, and ordered for prior to return.   8.  We reviewed the side effects of this regimen including nausea, fatigue, myelosuppression, infusion reaction, peripheral neuropathy, hepatotoxicity, myelodysplasia, alopecia, constipation or diarrhea, and potential death.  9.  The patient will follow up with me in 4 weeks for next cycle of treatment.   10. She will complete antibiotic for bronchitis as prescribed by her primary care physician.     Scribed for France Chun MD by LIDIA Chirinos. 12/19/2017  1:59 PM  France Chun M.D.  12/19/2017   I, France Chun MD, personally performed the services described in this documentation as scribed by the above named individual in my presence, and it is both accurate and complete.  12/19/2017  1:59 PM

## 2018-01-01 ENCOUNTER — EDUCATION (OUTPATIENT)
Dept: ONCOLOGY | Facility: HOSPITAL | Age: 78
End: 2018-01-01

## 2018-01-01 ENCOUNTER — APPOINTMENT (OUTPATIENT)
Dept: ULTRASOUND IMAGING | Facility: HOSPITAL | Age: 78
End: 2018-01-01

## 2018-01-01 ENCOUNTER — HOSPITAL ENCOUNTER (OUTPATIENT)
Dept: CT IMAGING | Facility: HOSPITAL | Age: 78
Discharge: HOME | End: 2018-01-15
Attending: INTERNAL MEDICINE | Admitting: INTERNAL MEDICINE

## 2018-01-01 ENCOUNTER — TELEPHONE (OUTPATIENT)
Dept: ONCOLOGY | Facility: CLINIC | Age: 78
End: 2018-01-01

## 2018-01-01 ENCOUNTER — OFFICE VISIT (OUTPATIENT)
Dept: ONCOLOGY | Facility: CLINIC | Age: 78
End: 2018-01-01

## 2018-01-01 ENCOUNTER — APPOINTMENT (OUTPATIENT)
Dept: ONCOLOGY | Facility: HOSPITAL | Age: 78
End: 2018-01-01

## 2018-01-01 ENCOUNTER — INFUSION (OUTPATIENT)
Dept: ONCOLOGY | Facility: HOSPITAL | Age: 78
End: 2018-01-01

## 2018-01-01 ENCOUNTER — HOSPITAL ENCOUNTER (OUTPATIENT)
Dept: CT IMAGING | Facility: HOSPITAL | Age: 78
Discharge: HOME | End: 2018-03-27
Attending: INTERNAL MEDICINE | Admitting: INTERNAL MEDICINE

## 2018-01-01 ENCOUNTER — DOCUMENTATION (OUTPATIENT)
Dept: ONCOLOGY | Facility: CLINIC | Age: 78
End: 2018-01-01

## 2018-01-01 ENCOUNTER — HOSPITAL ENCOUNTER (EMERGENCY)
Facility: HOSPITAL | Age: 78
Discharge: HOME | End: 2018-01-16
Attending: EMERGENCY MEDICINE | Admitting: EMERGENCY MEDICINE

## 2018-01-01 ENCOUNTER — HOSPITAL ENCOUNTER (OUTPATIENT)
Facility: HOSPITAL | Age: 78
Setting detail: OBSERVATION
Discharge: HOME | End: 2018-04-19
Attending: INTERNAL MEDICINE | Admitting: HOSPITALIST

## 2018-01-01 ENCOUNTER — APPOINTMENT (OUTPATIENT)
Dept: GENERAL RADIOLOGY | Facility: HOSPITAL | Age: 78
End: 2018-01-01

## 2018-01-01 VITALS
HEART RATE: 97 BPM | RESPIRATION RATE: 18 BRPM | HEIGHT: 64 IN | BODY MASS INDEX: 23.73 KG/M2 | WEIGHT: 139 LBS | OXYGEN SATURATION: 93 % | DIASTOLIC BLOOD PRESSURE: 84 MMHG | TEMPERATURE: 98.2 F | SYSTOLIC BLOOD PRESSURE: 160 MMHG

## 2018-01-01 VITALS
HEART RATE: 104 BPM | DIASTOLIC BLOOD PRESSURE: 63 MMHG | SYSTOLIC BLOOD PRESSURE: 135 MMHG | TEMPERATURE: 97.6 F | HEIGHT: 63 IN | RESPIRATION RATE: 18 BRPM | WEIGHT: 139 LBS | BODY MASS INDEX: 24.63 KG/M2

## 2018-01-01 VITALS
SYSTOLIC BLOOD PRESSURE: 143 MMHG | DIASTOLIC BLOOD PRESSURE: 65 MMHG | BODY MASS INDEX: 24.8 KG/M2 | WEIGHT: 140 LBS | HEART RATE: 107 BPM | TEMPERATURE: 98.1 F | HEIGHT: 63 IN | RESPIRATION RATE: 20 BRPM

## 2018-01-01 VITALS
WEIGHT: 141.5 LBS | HEIGHT: 63 IN | TEMPERATURE: 98 F | HEART RATE: 99 BPM | DIASTOLIC BLOOD PRESSURE: 56 MMHG | WEIGHT: 140 LBS | SYSTOLIC BLOOD PRESSURE: 141 MMHG | RESPIRATION RATE: 18 BRPM | BODY MASS INDEX: 24.8 KG/M2 | TEMPERATURE: 98.8 F | HEART RATE: 116 BPM | DIASTOLIC BLOOD PRESSURE: 74 MMHG | HEIGHT: 64 IN | BODY MASS INDEX: 24.16 KG/M2 | RESPIRATION RATE: 18 BRPM | OXYGEN SATURATION: 92 % | SYSTOLIC BLOOD PRESSURE: 104 MMHG

## 2018-01-01 VITALS
DIASTOLIC BLOOD PRESSURE: 71 MMHG | TEMPERATURE: 98.7 F | HEIGHT: 63 IN | WEIGHT: 137 LBS | BODY MASS INDEX: 24.27 KG/M2 | HEART RATE: 108 BPM | SYSTOLIC BLOOD PRESSURE: 142 MMHG | RESPIRATION RATE: 16 BRPM

## 2018-01-01 VITALS
WEIGHT: 132 LBS | TEMPERATURE: 97.5 F | BODY MASS INDEX: 22.53 KG/M2 | SYSTOLIC BLOOD PRESSURE: 115 MMHG | DIASTOLIC BLOOD PRESSURE: 78 MMHG | RESPIRATION RATE: 15 BRPM | HEART RATE: 99 BPM | HEIGHT: 64 IN

## 2018-01-01 VITALS
HEART RATE: 104 BPM | HEIGHT: 63 IN | DIASTOLIC BLOOD PRESSURE: 68 MMHG | BODY MASS INDEX: 25.16 KG/M2 | WEIGHT: 142 LBS | SYSTOLIC BLOOD PRESSURE: 142 MMHG | RESPIRATION RATE: 18 BRPM | TEMPERATURE: 97.3 F

## 2018-01-01 VITALS
WEIGHT: 139.1 LBS | OXYGEN SATURATION: 97 % | HEIGHT: 63 IN | RESPIRATION RATE: 18 BRPM | SYSTOLIC BLOOD PRESSURE: 143 MMHG | HEART RATE: 110 BPM | BODY MASS INDEX: 24.64 KG/M2 | TEMPERATURE: 97.2 F | DIASTOLIC BLOOD PRESSURE: 74 MMHG

## 2018-01-01 VITALS
RESPIRATION RATE: 20 BRPM | HEART RATE: 100 BPM | WEIGHT: 130 LBS | DIASTOLIC BLOOD PRESSURE: 43 MMHG | TEMPERATURE: 97.8 F | BODY MASS INDEX: 23.04 KG/M2 | HEIGHT: 63 IN | SYSTOLIC BLOOD PRESSURE: 112 MMHG

## 2018-01-01 VITALS
RESPIRATION RATE: 20 BRPM | HEART RATE: 99 BPM | SYSTOLIC BLOOD PRESSURE: 201 MMHG | WEIGHT: 139 LBS | BODY MASS INDEX: 23.73 KG/M2 | DIASTOLIC BLOOD PRESSURE: 105 MMHG | TEMPERATURE: 97.5 F | HEIGHT: 64 IN

## 2018-01-01 VITALS
BODY MASS INDEX: 24.98 KG/M2 | HEART RATE: 108 BPM | WEIGHT: 141 LBS | SYSTOLIC BLOOD PRESSURE: 140 MMHG | DIASTOLIC BLOOD PRESSURE: 65 MMHG | RESPIRATION RATE: 18 BRPM | TEMPERATURE: 98.2 F | HEIGHT: 63 IN

## 2018-01-01 VITALS
RESPIRATION RATE: 16 BRPM | BODY MASS INDEX: 23.9 KG/M2 | HEIGHT: 64 IN | WEIGHT: 140 LBS | TEMPERATURE: 98.5 F | HEART RATE: 86 BPM | SYSTOLIC BLOOD PRESSURE: 151 MMHG | DIASTOLIC BLOOD PRESSURE: 72 MMHG

## 2018-01-01 DIAGNOSIS — C50.111 MALIGNANT NEOPLASM OF CENTRAL PORTION OF RIGHT FEMALE BREAST, UNSPECIFIED ESTROGEN RECEPTOR STATUS (HCC): Primary | ICD-10-CM

## 2018-01-01 DIAGNOSIS — Z17.1 MALIGNANT NEOPLASM OF CENTRAL PORTION OF RIGHT BREAST IN FEMALE, ESTROGEN RECEPTOR NEGATIVE (HCC): ICD-10-CM

## 2018-01-01 DIAGNOSIS — C50.111 MALIGNANT NEOPLASM OF CENTRAL PORTION OF RIGHT FEMALE BREAST, UNSPECIFIED ESTROGEN RECEPTOR STATUS (HCC): ICD-10-CM

## 2018-01-01 DIAGNOSIS — Z17.1 MALIGNANT NEOPLASM OF CENTRAL PORTION OF RIGHT BREAST IN FEMALE, ESTROGEN RECEPTOR NEGATIVE (HCC): Primary | ICD-10-CM

## 2018-01-01 DIAGNOSIS — Z74.09 IMPAIRED FUNCTIONAL MOBILITY, BALANCE, GAIT, AND ENDURANCE: Primary | ICD-10-CM

## 2018-01-01 DIAGNOSIS — C50.111 MALIGNANT NEOPLASM OF CENTRAL PORTION OF RIGHT BREAST IN FEMALE, ESTROGEN RECEPTOR NEGATIVE (HCC): ICD-10-CM

## 2018-01-01 DIAGNOSIS — C50.111 MALIGNANT NEOPLASM OF CENTRAL PORTION OF RIGHT BREAST IN FEMALE, ESTROGEN RECEPTOR NEGATIVE (HCC): Primary | ICD-10-CM

## 2018-01-01 DIAGNOSIS — R91.8 LUNG NODULES: Primary | ICD-10-CM

## 2018-01-01 DIAGNOSIS — R91.8 LUNG NODULES: ICD-10-CM

## 2018-01-01 DIAGNOSIS — I10 ESSENTIAL HYPERTENSION: Primary | ICD-10-CM

## 2018-01-01 DIAGNOSIS — N39.0 URINARY TRACT INFECTION WITHOUT HEMATURIA, SITE UNSPECIFIED: ICD-10-CM

## 2018-01-01 DIAGNOSIS — C78.7 LIVER METASTASIS: ICD-10-CM

## 2018-01-01 LAB
ALBUMIN SERPL-MCNC: 2.2 G/DL (ref 3.2–4.8)
ALBUMIN SERPL-MCNC: 2.5 G/DL (ref 3.2–4.8)
ALBUMIN SERPL-MCNC: 3.3 G/DL (ref 3.2–4.8)
ALBUMIN SERPL-MCNC: 3.4 G/DL (ref 3.2–4.8)
ALBUMIN SERPL-MCNC: 3.5 G/DL (ref 3.2–4.8)
ALBUMIN SERPL-MCNC: 3.5 G/DL (ref 3.2–4.8)
ALBUMIN SERPL-MCNC: 3.7 G/DL (ref 3.2–4.8)
ALBUMIN/GLOB SERPL: 0.7 G/DL (ref 1.5–2.5)
ALBUMIN/GLOB SERPL: 0.7 G/DL (ref 1.5–2.5)
ALBUMIN/GLOB SERPL: 1.1 G/DL (ref 1.5–2.5)
ALBUMIN/GLOB SERPL: 1.3 G/DL (ref 1.5–2.5)
ALP SERPL-CCNC: 121 U/L (ref 25–100)
ALP SERPL-CCNC: 123 U/L (ref 25–100)
ALP SERPL-CCNC: 155 U/L (ref 25–100)
ALP SERPL-CCNC: 253 U/L (ref 25–100)
ALP SERPL-CCNC: 362 U/L (ref 25–100)
ALP SERPL-CCNC: 743 U/L (ref 25–100)
ALP SERPL-CCNC: 908 U/L (ref 25–100)
ALT SERPL W P-5'-P-CCNC: 155 U/L (ref 7–40)
ALT SERPL W P-5'-P-CCNC: 165 U/L (ref 7–40)
ALT SERPL W P-5'-P-CCNC: 26 U/L (ref 7–40)
ALT SERPL W P-5'-P-CCNC: 26 U/L (ref 7–40)
ALT SERPL W P-5'-P-CCNC: 30 U/L (ref 7–40)
ALT SERPL W P-5'-P-CCNC: 50 U/L (ref 7–40)
ALT SERPL W P-5'-P-CCNC: 84 U/L (ref 7–40)
ANION GAP SERPL CALCULATED.3IONS-SCNC: 1 MMOL/L (ref 3–11)
ANION GAP SERPL CALCULATED.3IONS-SCNC: 10 MMOL/L (ref 3–11)
ANION GAP SERPL CALCULATED.3IONS-SCNC: 5 MMOL/L (ref 3–11)
ANION GAP SERPL CALCULATED.3IONS-SCNC: 5 MMOL/L (ref 3–11)
ANION GAP SERPL CALCULATED.3IONS-SCNC: 7 MMOL/L (ref 3–11)
ANION GAP SERPL CALCULATED.3IONS-SCNC: 8 MMOL/L (ref 3–11)
ANION GAP SERPL CALCULATED.3IONS-SCNC: 9 MMOL/L (ref 3–11)
ANION GAP SERPL CALCULATED.3IONS-SCNC: 9 MMOL/L (ref 3–11)
AST SERPL-CCNC: 105 U/L (ref 0–33)
AST SERPL-CCNC: 188 U/L (ref 0–33)
AST SERPL-CCNC: 601 U/L (ref 0–33)
AST SERPL-CCNC: 61 U/L (ref 0–33)
AST SERPL-CCNC: 61 U/L (ref 0–33)
AST SERPL-CCNC: 633 U/L (ref 0–33)
AST SERPL-CCNC: 71 U/L (ref 0–33)
BACTERIA SPEC AEROBE CULT: NORMAL
BACTERIA SPEC AEROBE CULT: NORMAL
BACTERIA UR QL AUTO: ABNORMAL /HPF
BASOPHILS # BLD AUTO: 0.01 10*3/MM3 (ref 0–0.2)
BASOPHILS # BLD AUTO: 0.01 10*3/MM3 (ref 0–0.2)
BASOPHILS # BLD AUTO: 0.02 10*3/MM3 (ref 0–0.2)
BASOPHILS # BLD AUTO: 0.02 10*3/MM3 (ref 0–0.2)
BASOPHILS NFR BLD AUTO: 0.2 % (ref 0–1)
BILIRUB CONJ SERPL-MCNC: 3.7 MG/DL (ref 0–0.2)
BILIRUB SERPL-MCNC: 0.3 MG/DL (ref 0.3–1.2)
BILIRUB SERPL-MCNC: 1 MG/DL (ref 0.3–1.2)
BILIRUB SERPL-MCNC: 4.4 MG/DL (ref 0.3–1.2)
BILIRUB SERPL-MCNC: 4.5 MG/DL (ref 0.3–1.2)
BILIRUB UR QL STRIP: NEGATIVE
BNP SERPL-MCNC: 327 PG/ML (ref 0–100)
BUN BLD-MCNC: 12 MG/DL (ref 9–23)
BUN BLD-MCNC: 13 MG/DL (ref 9–23)
BUN BLD-MCNC: 16 MG/DL (ref 9–23)
BUN BLD-MCNC: 16 MG/DL (ref 9–23)
BUN BLD-MCNC: 24 MG/DL (ref 9–23)
BUN BLD-MCNC: 30 MG/DL (ref 9–23)
BUN BLD-MCNC: 32 MG/DL (ref 9–23)
BUN BLD-MCNC: 9 MG/DL (ref 9–23)
BUN/CREAT SERPL: 11.3 (ref 7–25)
BUN/CREAT SERPL: 16.3 (ref 7–25)
BUN/CREAT SERPL: 17.1 (ref 7–25)
BUN/CREAT SERPL: 20 (ref 7–25)
BUN/CREAT SERPL: 20 (ref 7–25)
BUN/CREAT SERPL: 26.7 (ref 7–25)
BUN/CREAT SERPL: 30 (ref 7–25)
BUN/CREAT SERPL: 30 (ref 7–25)
CALCIUM SPEC-SCNC: 8.1 MG/DL (ref 8.7–10.4)
CALCIUM SPEC-SCNC: 8.3 MG/DL (ref 8.7–10.4)
CALCIUM SPEC-SCNC: 8.6 MG/DL (ref 8.7–10.4)
CALCIUM SPEC-SCNC: 8.7 MG/DL (ref 8.7–10.4)
CALCIUM SPEC-SCNC: 8.7 MG/DL (ref 8.7–10.4)
CALCIUM SPEC-SCNC: 8.8 MG/DL (ref 8.7–10.4)
CALCIUM SPEC-SCNC: 8.8 MG/DL (ref 8.7–10.4)
CALCIUM SPEC-SCNC: 9 MG/DL (ref 8.7–10.4)
CHLORIDE SERPL-SCNC: 100 MMOL/L (ref 99–109)
CHLORIDE SERPL-SCNC: 102 MMOL/L (ref 99–109)
CHLORIDE SERPL-SCNC: 102 MMOL/L (ref 99–109)
CHLORIDE SERPL-SCNC: 103 MMOL/L (ref 99–109)
CHLORIDE SERPL-SCNC: 103 MMOL/L (ref 99–109)
CHLORIDE SERPL-SCNC: 104 MMOL/L (ref 99–109)
CHLORIDE SERPL-SCNC: 105 MMOL/L (ref 99–109)
CHLORIDE SERPL-SCNC: 107 MMOL/L (ref 99–109)
CLARITY UR: ABNORMAL
CO2 SERPL-SCNC: 26 MMOL/L (ref 20–31)
CO2 SERPL-SCNC: 27 MMOL/L (ref 20–31)
CO2 SERPL-SCNC: 28 MMOL/L (ref 20–31)
CO2 SERPL-SCNC: 29 MMOL/L (ref 20–31)
CO2 SERPL-SCNC: 30 MMOL/L (ref 20–31)
CO2 SERPL-SCNC: 31 MMOL/L (ref 20–31)
CO2 SERPL-SCNC: 33 MMOL/L (ref 20–31)
CO2 SERPL-SCNC: 34 MMOL/L (ref 20–31)
COLOR UR: YELLOW
CREAT BLD-MCNC: 0.7 MG/DL (ref 0.6–1.3)
CREAT BLD-MCNC: 0.8 MG/DL (ref 0.6–1.3)
CREAT BLD-MCNC: 1 MG/DL (ref 0.6–1.3)
CREAT BLD-MCNC: 1.2 MG/DL (ref 0.6–1.3)
CREAT BLDA-MCNC: 0.7 MG/DL (ref 0.6–1.3)
CREAT BLDA-MCNC: 0.9 MG/DL (ref 0.6–1.3)
CREAT BLDA-MCNC: 0.9 MG/DL (ref 0.6–1.3)
DEPRECATED RDW RBC AUTO: 60.3 FL (ref 37–54)
DEPRECATED RDW RBC AUTO: 62.4 FL (ref 37–54)
DEPRECATED RDW RBC AUTO: 62.9 FL (ref 37–54)
DEPRECATED RDW RBC AUTO: 64.6 FL (ref 37–54)
EOSINOPHIL # BLD AUTO: 0.01 10*3/MM3 (ref 0–0.3)
EOSINOPHIL # BLD AUTO: 0.02 10*3/MM3 (ref 0–0.3)
EOSINOPHIL # BLD AUTO: 0.03 10*3/MM3 (ref 0–0.3)
EOSINOPHIL # BLD AUTO: 0.04 10*3/MM3 (ref 0–0.3)
EOSINOPHIL NFR BLD AUTO: 0.1 % (ref 0–3)
EOSINOPHIL NFR BLD AUTO: 0.2 % (ref 0–3)
EOSINOPHIL NFR BLD AUTO: 0.4 % (ref 0–3)
EOSINOPHIL NFR BLD AUTO: 0.7 % (ref 0–3)
ERYTHROCYTE [DISTWIDTH] IN BLOOD BY AUTOMATED COUNT: 18.7 % (ref 11.3–14.5)
ERYTHROCYTE [DISTWIDTH] IN BLOOD BY AUTOMATED COUNT: 18.8 % (ref 11.3–14.5)
ERYTHROCYTE [DISTWIDTH] IN BLOOD BY AUTOMATED COUNT: 19.1 % (ref 11.3–14.5)
ERYTHROCYTE [DISTWIDTH] IN BLOOD BY AUTOMATED COUNT: 19.3 % (ref 11.3–14.5)
ERYTHROCYTE [DISTWIDTH] IN BLOOD BY AUTOMATED COUNT: 19.9 % (ref 11.3–14.5)
ERYTHROCYTE [DISTWIDTH] IN BLOOD BY AUTOMATED COUNT: 20.4 % (ref 11.3–14.5)
ERYTHROCYTE [DISTWIDTH] IN BLOOD BY AUTOMATED COUNT: 20.6 % (ref 11.3–14.5)
ERYTHROCYTE [DISTWIDTH] IN BLOOD BY AUTOMATED COUNT: 20.7 % (ref 11.3–14.5)
ERYTHROCYTE [DISTWIDTH] IN BLOOD BY AUTOMATED COUNT: 20.9 % (ref 11.3–14.5)
ERYTHROCYTE [DISTWIDTH] IN BLOOD BY AUTOMATED COUNT: 21.1 % (ref 11.3–14.5)
GFR SERPL CREATININE-BSD FRML MDRD: 53 ML/MIN/1.73
GFR SERPL CREATININE-BSD FRML MDRD: 65 ML/MIN/1.73
GFR SERPL CREATININE-BSD FRML MDRD: 84 ML/MIN/1.73
GFR SERPL CREATININE-BSD FRML MDRD: 98 ML/MIN/1.73
GLOBULIN UR ELPH-MCNC: 2.9 GM/DL
GLOBULIN UR ELPH-MCNC: 3 GM/DL
GLOBULIN UR ELPH-MCNC: 3.2 GM/DL
GLOBULIN UR ELPH-MCNC: 3.3 GM/DL
GLOBULIN UR ELPH-MCNC: 3.7 GM/DL
GLUCOSE BLD-MCNC: 100 MG/DL (ref 70–100)
GLUCOSE BLD-MCNC: 108 MG/DL (ref 70–100)
GLUCOSE BLD-MCNC: 152 MG/DL (ref 70–100)
GLUCOSE BLD-MCNC: 61 MG/DL (ref 70–100)
GLUCOSE BLD-MCNC: 73 MG/DL (ref 70–100)
GLUCOSE BLD-MCNC: 73 MG/DL (ref 70–100)
GLUCOSE BLD-MCNC: 81 MG/DL (ref 70–100)
GLUCOSE BLD-MCNC: 84 MG/DL (ref 70–100)
GLUCOSE UR STRIP-MCNC: NEGATIVE MG/DL
HCT VFR BLD AUTO: 30.4 % (ref 34.5–44)
HCT VFR BLD AUTO: 31.5 % (ref 34.5–44)
HCT VFR BLD AUTO: 32.9 % (ref 34.5–44)
HCT VFR BLD AUTO: 33 % (ref 34.5–44)
HCT VFR BLD AUTO: 33.2 % (ref 34.5–44)
HCT VFR BLD AUTO: 33.4 % (ref 34.5–44)
HCT VFR BLD AUTO: 33.7 % (ref 34.5–44)
HCT VFR BLD AUTO: 33.7 % (ref 34.5–44)
HCT VFR BLD AUTO: 35.3 % (ref 34.5–44)
HCT VFR BLD AUTO: 35.6 % (ref 34.5–44)
HCT VFR BLD AUTO: 36.3 % (ref 34.5–44)
HCT VFR BLD AUTO: 37.2 % (ref 34.5–44)
HGB BLD-MCNC: 10 G/DL (ref 11.5–15.5)
HGB BLD-MCNC: 10.1 G/DL (ref 11.5–15.5)
HGB BLD-MCNC: 10.2 G/DL (ref 11.5–15.5)
HGB BLD-MCNC: 10.3 G/DL (ref 11.5–15.5)
HGB BLD-MCNC: 10.4 G/DL (ref 11.5–15.5)
HGB BLD-MCNC: 10.4 G/DL (ref 11.5–15.5)
HGB BLD-MCNC: 10.5 G/DL (ref 11.5–15.5)
HGB BLD-MCNC: 10.8 G/DL (ref 11.5–15.5)
HGB BLD-MCNC: 11.3 G/DL (ref 11.5–15.5)
HGB BLD-MCNC: 11.6 G/DL (ref 11.5–15.5)
HGB BLD-MCNC: 9.5 G/DL (ref 11.5–15.5)
HGB BLD-MCNC: 9.8 G/DL (ref 11.5–15.5)
HGB UR QL STRIP.AUTO: NEGATIVE
IMM GRANULOCYTES # BLD: 0.02 10*3/MM3 (ref 0–0.03)
IMM GRANULOCYTES # BLD: 0.05 10*3/MM3 (ref 0–0.03)
IMM GRANULOCYTES # BLD: 0.06 10*3/MM3 (ref 0–0.03)
IMM GRANULOCYTES # BLD: 0.09 10*3/MM3 (ref 0–0.03)
IMM GRANULOCYTES NFR BLD: 0.4 % (ref 0–0.6)
IMM GRANULOCYTES NFR BLD: 0.5 % (ref 0–0.6)
IMM GRANULOCYTES NFR BLD: 0.7 % (ref 0–0.6)
IMM GRANULOCYTES NFR BLD: 0.9 % (ref 0–0.6)
KETONES UR QL STRIP: NEGATIVE
LEUKOCYTE ESTERASE UR QL STRIP.AUTO: ABNORMAL
LYMPHOCYTES # BLD AUTO: 1.38 10*3/MM3 (ref 0.6–4.8)
LYMPHOCYTES # BLD AUTO: 1.4 10*3/MM3 (ref 0.6–4.8)
LYMPHOCYTES # BLD AUTO: 1.48 10*3/MM3 (ref 0.6–4.8)
LYMPHOCYTES # BLD AUTO: 1.5 10*3/MM3 (ref 0.6–4.8)
LYMPHOCYTES # BLD AUTO: 1.6 10*3/MM3 (ref 0.6–4.8)
LYMPHOCYTES # BLD AUTO: 1.67 10*3/MM3 (ref 0.6–4.8)
LYMPHOCYTES # BLD AUTO: 1.7 10*3/MM3 (ref 0.6–4.8)
LYMPHOCYTES # BLD AUTO: 1.7 10*3/MM3 (ref 0.6–4.8)
LYMPHOCYTES # BLD AUTO: 1.71 10*3/MM3 (ref 0.6–4.8)
LYMPHOCYTES # BLD AUTO: 1.9 10*3/MM3 (ref 0.6–4.8)
LYMPHOCYTES NFR BLD AUTO: 11.7 % (ref 24–44)
LYMPHOCYTES NFR BLD AUTO: 12.1 % (ref 24–44)
LYMPHOCYTES NFR BLD AUTO: 13.7 % (ref 24–44)
LYMPHOCYTES NFR BLD AUTO: 22.3 % (ref 24–44)
LYMPHOCYTES NFR BLD AUTO: 26.3 % (ref 24–44)
LYMPHOCYTES NFR BLD AUTO: 26.9 % (ref 24–44)
LYMPHOCYTES NFR BLD AUTO: 27.7 % (ref 24–44)
LYMPHOCYTES NFR BLD AUTO: 29.3 % (ref 24–44)
LYMPHOCYTES NFR BLD AUTO: 29.4 % (ref 24–44)
LYMPHOCYTES NFR BLD AUTO: 30.2 % (ref 24–44)
LYMPHOCYTES NFR BLD AUTO: 32.7 % (ref 24–44)
LYMPHOCYTES NFR BLD AUTO: 77 % (ref 24–44)
MAGNESIUM SERPL-MCNC: 1.5 MG/DL (ref 1.3–2.7)
MAGNESIUM SERPL-MCNC: 1.6 MG/DL (ref 1.3–2.7)
MCH RBC QN AUTO: 26.9 PG (ref 27–31)
MCH RBC QN AUTO: 27.2 PG (ref 27–31)
MCH RBC QN AUTO: 27.2 PG (ref 27–31)
MCH RBC QN AUTO: 27.5 PG (ref 27–31)
MCH RBC QN AUTO: 27.8 PG (ref 27–31)
MCH RBC QN AUTO: 27.8 PG (ref 27–31)
MCH RBC QN AUTO: 28.4 PG (ref 27–31)
MCH RBC QN AUTO: 28.4 PG (ref 27–31)
MCH RBC QN AUTO: 28.8 PG (ref 27–31)
MCHC RBC AUTO-ENTMCNC: 29.8 G/DL (ref 32–36)
MCHC RBC AUTO-ENTMCNC: 29.8 G/DL (ref 32–36)
MCHC RBC AUTO-ENTMCNC: 30.2 G/DL (ref 32–36)
MCHC RBC AUTO-ENTMCNC: 30.3 G/DL (ref 32–36)
MCHC RBC AUTO-ENTMCNC: 30.3 G/DL (ref 32–36)
MCHC RBC AUTO-ENTMCNC: 30.5 G/DL (ref 32–36)
MCHC RBC AUTO-ENTMCNC: 30.5 G/DL (ref 32–36)
MCHC RBC AUTO-ENTMCNC: 31.1 G/DL (ref 32–36)
MCHC RBC AUTO-ENTMCNC: 31.2 G/DL (ref 32–36)
MCHC RBC AUTO-ENTMCNC: 31.3 G/DL (ref 32–36)
MCHC RBC AUTO-ENTMCNC: 31.3 G/DL (ref 32–36)
MCHC RBC AUTO-ENTMCNC: 32.9 G/DL (ref 32–36)
MCV RBC AUTO: 87.6 FL (ref 80–99)
MCV RBC AUTO: 88.9 FL (ref 80–99)
MCV RBC AUTO: 89.1 FL (ref 80–99)
MCV RBC AUTO: 89.4 FL (ref 80–99)
MCV RBC AUTO: 89.7 FL (ref 80–99)
MCV RBC AUTO: 90.1 FL (ref 80–99)
MCV RBC AUTO: 90.2 FL (ref 80–99)
MCV RBC AUTO: 90.8 FL (ref 80–99)
MCV RBC AUTO: 90.8 FL (ref 80–99)
MCV RBC AUTO: 91 FL (ref 80–99)
MCV RBC AUTO: 91.1 FL (ref 80–99)
MCV RBC AUTO: 92.2 FL (ref 80–99)
MONOCYTES # BLD AUTO: 0.1 10*3/MM3 (ref 0–1)
MONOCYTES # BLD AUTO: 0.2 10*3/MM3 (ref 0–1)
MONOCYTES # BLD AUTO: 0.3 10*3/MM3 (ref 0–1)
MONOCYTES # BLD AUTO: 0.3 10*3/MM3 (ref 0–1)
MONOCYTES # BLD AUTO: 0.35 10*3/MM3 (ref 0–1)
MONOCYTES # BLD AUTO: 0.4 10*3/MM3 (ref 0–1)
MONOCYTES # BLD AUTO: 0.5 10*3/MM3 (ref 0–1)
MONOCYTES # BLD AUTO: 0.5 10*3/MM3 (ref 0–1)
MONOCYTES # BLD AUTO: 1.09 10*3/MM3 (ref 0–1)
MONOCYTES # BLD AUTO: 1.33 10*3/MM3 (ref 0–1)
MONOCYTES NFR BLD AUTO: 1.3 % (ref 0–12)
MONOCYTES NFR BLD AUTO: 10.5 % (ref 0–12)
MONOCYTES NFR BLD AUTO: 3.6 % (ref 0–12)
MONOCYTES NFR BLD AUTO: 4 % (ref 0–12)
MONOCYTES NFR BLD AUTO: 4.5 % (ref 0–12)
MONOCYTES NFR BLD AUTO: 4.7 % (ref 0–12)
MONOCYTES NFR BLD AUTO: 4.8 % (ref 0–12)
MONOCYTES NFR BLD AUTO: 6.5 % (ref 0–12)
MONOCYTES NFR BLD AUTO: 6.7 % (ref 0–12)
MONOCYTES NFR BLD AUTO: 7 % (ref 0–12)
MONOCYTES NFR BLD AUTO: 8.8 % (ref 0–12)
MONOCYTES NFR BLD AUTO: 8.9 % (ref 0–12)
NEUTROPHILS # BLD AUTO: 0.4 10*3/MM3 (ref 1.5–8.3)
NEUTROPHILS # BLD AUTO: 12.1 10*3/MM3 (ref 1.5–8.3)
NEUTROPHILS # BLD AUTO: 3.2 10*3/MM3 (ref 1.5–8.3)
NEUTROPHILS # BLD AUTO: 3.36 10*3/MM3 (ref 1.5–8.3)
NEUTROPHILS # BLD AUTO: 3.47 10*3/MM3 (ref 1.5–8.3)
NEUTROPHILS # BLD AUTO: 3.5 10*3/MM3 (ref 1.5–8.3)
NEUTROPHILS # BLD AUTO: 3.7 10*3/MM3 (ref 1.5–8.3)
NEUTROPHILS # BLD AUTO: 3.8 10*3/MM3 (ref 1.5–8.3)
NEUTROPHILS # BLD AUTO: 3.9 10*3/MM3 (ref 1.5–8.3)
NEUTROPHILS # BLD AUTO: 4.9 10*3/MM3 (ref 1.5–8.3)
NEUTROPHILS # BLD AUTO: 9.4 10*3/MM3 (ref 1.5–8.3)
NEUTROPHILS # BLD AUTO: 9.74 10*3/MM3 (ref 1.5–8.3)
NEUTROPHILS NFR BLD AUTO: 16 % (ref 41–71)
NEUTROPHILS NFR BLD AUTO: 59.2 % (ref 41–71)
NEUTROPHILS NFR BLD AUTO: 63.3 % (ref 41–71)
NEUTROPHILS NFR BLD AUTO: 64.1 % (ref 41–71)
NEUTROPHILS NFR BLD AUTO: 66 % (ref 41–71)
NEUTROPHILS NFR BLD AUTO: 66 % (ref 41–71)
NEUTROPHILS NFR BLD AUTO: 68.3 % (ref 41–71)
NEUTROPHILS NFR BLD AUTO: 71 % (ref 41–71)
NEUTROPHILS NFR BLD AUTO: 73.2 % (ref 41–71)
NEUTROPHILS NFR BLD AUTO: 77 % (ref 41–71)
NEUTROPHILS NFR BLD AUTO: 77 % (ref 41–71)
NEUTROPHILS NFR BLD AUTO: 84.3 % (ref 41–71)
NITRITE UR QL STRIP: NEGATIVE
NRBC BLD MANUAL-RTO: 0 /100 WBC (ref 0–0)
PH UR STRIP.AUTO: 7 [PH] (ref 5–8)
PLAT MORPH BLD: NORMAL
PLATELET # BLD AUTO: 212 10*3/MM3 (ref 150–450)
PLATELET # BLD AUTO: 236 10*3/MM3 (ref 150–450)
PLATELET # BLD AUTO: 239 10*3/MM3 (ref 150–450)
PLATELET # BLD AUTO: 319 10*3/MM3 (ref 150–450)
PLATELET # BLD AUTO: 328 10*3/MM3 (ref 150–450)
PLATELET # BLD AUTO: 336 10*3/MM3 (ref 150–450)
PLATELET # BLD AUTO: 354 10*3/MM3 (ref 150–450)
PLATELET # BLD AUTO: 358 10*3/MM3 (ref 150–450)
PLATELET # BLD AUTO: 359 10*3/MM3 (ref 150–450)
PLATELET # BLD AUTO: 370 10*3/MM3 (ref 150–450)
PLATELET # BLD AUTO: 373 10*3/MM3 (ref 150–450)
PLATELET # BLD AUTO: 403 10*3/MM3 (ref 150–450)
PMV BLD AUTO: 10 FL (ref 6–12)
PMV BLD AUTO: 10.2 FL (ref 6–12)
PMV BLD AUTO: 10.6 FL (ref 6–12)
PMV BLD AUTO: 7.2 FL (ref 6–12)
PMV BLD AUTO: 7.4 FL (ref 6–12)
PMV BLD AUTO: 7.5 FL (ref 6–12)
PMV BLD AUTO: 7.5 FL (ref 6–12)
PMV BLD AUTO: 7.7 FL (ref 6–12)
PMV BLD AUTO: 7.7 FL (ref 6–12)
PMV BLD AUTO: 9.5 FL (ref 6–12)
POTASSIUM BLD-SCNC: 3.2 MMOL/L (ref 3.5–5.5)
POTASSIUM BLD-SCNC: 3.5 MMOL/L (ref 3.5–5.5)
POTASSIUM BLD-SCNC: 3.8 MMOL/L (ref 3.5–5.5)
POTASSIUM BLD-SCNC: 4.4 MMOL/L (ref 3.5–5.5)
POTASSIUM BLD-SCNC: 4.5 MMOL/L (ref 3.5–5.5)
POTASSIUM BLD-SCNC: 4.5 MMOL/L (ref 3.5–5.5)
PROCALCITONIN SERPL-MCNC: 1.83 NG/ML
PROT SERPL-MCNC: 5.4 G/DL (ref 5.7–8.2)
PROT SERPL-MCNC: 6.2 G/DL (ref 5.7–8.2)
PROT SERPL-MCNC: 6.3 G/DL (ref 5.7–8.2)
PROT SERPL-MCNC: 6.6 G/DL (ref 5.7–8.2)
PROT SERPL-MCNC: 6.6 G/DL (ref 5.7–8.2)
PROT SERPL-MCNC: 6.7 G/DL (ref 5.7–8.2)
PROT SERPL-MCNC: 6.8 G/DL (ref 5.7–8.2)
PROT UR QL STRIP: ABNORMAL
RBC # BLD AUTO: 3.47 10*6/MM3 (ref 3.89–5.14)
RBC # BLD AUTO: 3.54 10*6/MM3 (ref 3.89–5.14)
RBC # BLD AUTO: 3.57 10*6/MM3 (ref 3.89–5.14)
RBC # BLD AUTO: 3.65 10*6/MM3 (ref 3.89–5.14)
RBC # BLD AUTO: 3.67 10*6/MM3 (ref 3.89–5.14)
RBC # BLD AUTO: 3.71 10*6/MM3 (ref 3.89–5.14)
RBC # BLD AUTO: 3.73 10*6/MM3 (ref 3.89–5.14)
RBC # BLD AUTO: 3.74 10*6/MM3 (ref 3.89–5.14)
RBC # BLD AUTO: 3.88 10*6/MM3 (ref 3.89–5.14)
RBC # BLD AUTO: 3.97 10*6/MM3 (ref 3.89–5.14)
RBC # BLD AUTO: 4.08 10*6/MM3 (ref 3.89–5.14)
RBC # BLD AUTO: 4.09 10*6/MM3 (ref 3.89–5.14)
RBC # UR: ABNORMAL /HPF
RBC MORPH BLD: NORMAL
REF LAB TEST METHOD: ABNORMAL
SODIUM BLD-SCNC: 135 MMOL/L (ref 132–146)
SODIUM BLD-SCNC: 139 MMOL/L (ref 132–146)
SODIUM BLD-SCNC: 140 MMOL/L (ref 132–146)
SODIUM BLD-SCNC: 141 MMOL/L (ref 132–146)
SODIUM BLD-SCNC: 142 MMOL/L (ref 132–146)
SODIUM BLD-SCNC: 143 MMOL/L (ref 132–146)
SP GR UR STRIP: 1.01 (ref 1–1.03)
SQUAMOUS #/AREA URNS HPF: ABNORMAL /HPF
T4 FREE SERPL-MCNC: 1.18 NG/DL (ref 0.89–1.76)
TROPONIN I SERPL-MCNC: 0 NG/ML (ref 0–0.07)
TSH SERPL DL<=0.05 MIU/L-ACNC: 0.07 MIU/ML (ref 0.35–5.35)
UROBILINOGEN UR QL STRIP: ABNORMAL
WBC MORPH BLD: NORMAL
WBC NRBC COR # BLD: 12.21 10*3/MM3 (ref 3.5–10.8)
WBC NRBC COR # BLD: 12.64 10*3/MM3 (ref 3.5–10.8)
WBC NRBC COR # BLD: 14.3 10*3/MM3 (ref 3.5–10.8)
WBC NRBC COR # BLD: 2.2 10*3/MM3 (ref 3.5–10.8)
WBC NRBC COR # BLD: 4.9 10*3/MM3 (ref 3.5–10.8)
WBC NRBC COR # BLD: 5.25 10*3/MM3 (ref 3.5–10.8)
WBC NRBC COR # BLD: 5.5 10*3/MM3 (ref 3.5–10.8)
WBC NRBC COR # BLD: 5.67 10*3/MM3 (ref 3.5–10.8)
WBC NRBC COR # BLD: 5.9 10*3/MM3 (ref 3.5–10.8)
WBC NRBC COR # BLD: 6.7 10*3/MM3 (ref 3.5–10.8)
WBC UR QL AUTO: ABNORMAL /HPF

## 2018-01-01 PROCEDURE — 80053 COMPREHEN METABOLIC PANEL: CPT | Performed by: NURSE PRACTITIONER

## 2018-01-01 PROCEDURE — 85025 COMPLETE CBC W/AUTO DIFF WBC: CPT

## 2018-01-01 PROCEDURE — 96375 TX/PRO/DX INJ NEW DRUG ADDON: CPT

## 2018-01-01 PROCEDURE — 25010000002 HEPARIN FLUSH (PORCINE) 100 UNIT/ML SOLUTION: Performed by: INTERNAL MEDICINE

## 2018-01-01 PROCEDURE — 25010000002 HYDRALAZINE PER 20 MG: Performed by: NURSE PRACTITIONER

## 2018-01-01 PROCEDURE — 71260 CT THORAX DX C+: CPT

## 2018-01-01 PROCEDURE — 25010000002 PACLITAXEL PROTEIN-BOUND PART PER 1 MG: Performed by: INTERNAL MEDICINE

## 2018-01-01 PROCEDURE — 96376 TX/PRO/DX INJ SAME DRUG ADON: CPT

## 2018-01-01 PROCEDURE — 96361 HYDRATE IV INFUSION ADD-ON: CPT

## 2018-01-01 PROCEDURE — 80053 COMPREHEN METABOLIC PANEL: CPT

## 2018-01-01 PROCEDURE — 99214 OFFICE O/P EST MOD 30 MIN: CPT | Performed by: INTERNAL MEDICINE

## 2018-01-01 PROCEDURE — 82565 ASSAY OF CREATININE: CPT

## 2018-01-01 PROCEDURE — 96413 CHEMO IV INFUSION 1 HR: CPT

## 2018-01-01 PROCEDURE — 36591 DRAW BLOOD OFF VENOUS DEVICE: CPT

## 2018-01-01 PROCEDURE — 94640 AIRWAY INHALATION TREATMENT: CPT

## 2018-01-01 PROCEDURE — 25010000002 ONDANSETRON PER 1 MG: Performed by: NURSE PRACTITIONER

## 2018-01-01 PROCEDURE — 84145 PROCALCITONIN (PCT): CPT | Performed by: HOSPITALIST

## 2018-01-01 PROCEDURE — 83735 ASSAY OF MAGNESIUM: CPT | Performed by: INTERNAL MEDICINE

## 2018-01-01 PROCEDURE — 99215 OFFICE O/P EST HI 40 MIN: CPT | Performed by: INTERNAL MEDICINE

## 2018-01-01 PROCEDURE — G0378 HOSPITAL OBSERVATION PER HR: HCPCS

## 2018-01-01 PROCEDURE — 25010000002 DEXAMETHASONE PER 1 MG: Performed by: INTERNAL MEDICINE

## 2018-01-01 PROCEDURE — 85025 COMPLETE CBC W/AUTO DIFF WBC: CPT | Performed by: NURSE PRACTITIONER

## 2018-01-01 PROCEDURE — 96374 THER/PROPH/DIAG INJ IV PUSH: CPT

## 2018-01-01 PROCEDURE — 84443 ASSAY THYROID STIM HORMONE: CPT

## 2018-01-01 PROCEDURE — 74018 RADEX ABDOMEN 1 VIEW: CPT

## 2018-01-01 PROCEDURE — 96523 IRRIG DRUG DELIVERY DEVICE: CPT

## 2018-01-01 PROCEDURE — 74177 CT ABD & PELVIS W/CONTRAST: CPT

## 2018-01-01 PROCEDURE — 85025 COMPLETE CBC W/AUTO DIFF WBC: CPT | Performed by: INTERNAL MEDICINE

## 2018-01-01 PROCEDURE — 71045 X-RAY EXAM CHEST 1 VIEW: CPT

## 2018-01-01 PROCEDURE — 80053 COMPREHEN METABOLIC PANEL: CPT | Performed by: INTERNAL MEDICINE

## 2018-01-01 PROCEDURE — 85007 BL SMEAR W/DIFF WBC COUNT: CPT | Performed by: INTERNAL MEDICINE

## 2018-01-01 PROCEDURE — G8978 MOBILITY CURRENT STATUS: HCPCS

## 2018-01-01 PROCEDURE — 94799 UNLISTED PULMONARY SVC/PX: CPT

## 2018-01-01 PROCEDURE — 93005 ELECTROCARDIOGRAM TRACING: CPT | Performed by: EMERGENCY MEDICINE

## 2018-01-01 PROCEDURE — 81001 URINALYSIS AUTO W/SCOPE: CPT | Performed by: NURSE PRACTITIONER

## 2018-01-01 PROCEDURE — 96372 THER/PROPH/DIAG INJ SC/IM: CPT

## 2018-01-01 PROCEDURE — 99284 EMERGENCY DEPT VISIT MOD MDM: CPT

## 2018-01-01 PROCEDURE — 25010000002 IOPAMIDOL 61 % SOLUTION: Performed by: INTERNAL MEDICINE

## 2018-01-01 PROCEDURE — 25010000002 HEPARIN FLUSH (PORCINE) 100 UNIT/ML SOLUTION: Performed by: EMERGENCY MEDICINE

## 2018-01-01 PROCEDURE — 99213 OFFICE O/P EST LOW 20 MIN: CPT | Performed by: INTERNAL MEDICINE

## 2018-01-01 PROCEDURE — 87086 URINE CULTURE/COLONY COUNT: CPT | Performed by: NURSE PRACTITIONER

## 2018-01-01 PROCEDURE — 82248 BILIRUBIN DIRECT: CPT | Performed by: HOSPITALIST

## 2018-01-01 PROCEDURE — 25810000003 DEXTROSE-NACL PER 500 ML: Performed by: HOSPITALIST

## 2018-01-01 PROCEDURE — 80048 BASIC METABOLIC PNL TOTAL CA: CPT

## 2018-01-01 PROCEDURE — 83735 ASSAY OF MAGNESIUM: CPT

## 2018-01-01 PROCEDURE — 0 IOPAMIDOL 61 % SOLUTION: Performed by: INTERNAL MEDICINE

## 2018-01-01 PROCEDURE — 96360 HYDRATION IV INFUSION INIT: CPT

## 2018-01-01 PROCEDURE — 84439 ASSAY OF FREE THYROXINE: CPT

## 2018-01-01 PROCEDURE — G8979 MOBILITY GOAL STATUS: HCPCS

## 2018-01-01 PROCEDURE — 97162 PT EVAL MOD COMPLEX 30 MIN: CPT

## 2018-01-01 PROCEDURE — 97110 THERAPEUTIC EXERCISES: CPT

## 2018-01-01 PROCEDURE — 76705 ECHO EXAM OF ABDOMEN: CPT

## 2018-01-01 PROCEDURE — 84484 ASSAY OF TROPONIN QUANT: CPT

## 2018-01-01 PROCEDURE — 83880 ASSAY OF NATRIURETIC PEPTIDE: CPT | Performed by: NURSE PRACTITIONER

## 2018-01-01 PROCEDURE — 96411 CHEMO IV PUSH ADDL DRUG: CPT

## 2018-01-01 PROCEDURE — 97116 GAIT TRAINING THERAPY: CPT

## 2018-01-01 PROCEDURE — G0379 DIRECT REFER HOSPITAL OBSERV: HCPCS

## 2018-01-01 PROCEDURE — 99217 PR OBSERVATION CARE DISCHARGE MANAGEMENT: CPT | Performed by: HOSPITALIST

## 2018-01-01 PROCEDURE — 25010000002 ENOXAPARIN PER 10 MG: Performed by: HOSPITALIST

## 2018-01-01 PROCEDURE — 99220 PR INITIAL OBSERVATION CARE/DAY 70 MINUTES: CPT | Performed by: INTERNAL MEDICINE

## 2018-01-01 PROCEDURE — 99226 PR SBSQ OBSERVATION CARE/DAY 35 MINUTES: CPT | Performed by: HOSPITALIST

## 2018-01-01 RX ORDER — SODIUM CHLORIDE 0.9 % (FLUSH) 0.9 %
10 SYRINGE (ML) INJECTION AS NEEDED
Status: DISCONTINUED | OUTPATIENT
Start: 2018-01-01 | End: 2018-01-01 | Stop reason: HOSPADM

## 2018-01-01 RX ORDER — HYDROCODONE BITARTRATE AND ACETAMINOPHEN 5; 325 MG/1; MG/1
1 TABLET ORAL EVERY 6 HOURS PRN
Status: DISCONTINUED | OUTPATIENT
Start: 2018-01-01 | End: 2018-01-01 | Stop reason: HOSPADM

## 2018-01-01 RX ORDER — SODIUM CHLORIDE 9 MG/ML
1000 INJECTION, SOLUTION INTRAVENOUS ONCE
Status: COMPLETED | OUTPATIENT
Start: 2018-01-01 | End: 2018-01-01

## 2018-01-01 RX ORDER — SODIUM CHLORIDE 9 MG/ML
125 INJECTION, SOLUTION INTRAVENOUS CONTINUOUS
Status: DISCONTINUED | OUTPATIENT
Start: 2018-01-01 | End: 2018-01-01

## 2018-01-01 RX ORDER — IPRATROPIUM BROMIDE AND ALBUTEROL SULFATE 2.5; .5 MG/3ML; MG/3ML
SOLUTION RESPIRATORY (INHALATION)
Refills: 0 | COMMUNITY
Start: 2018-01-01

## 2018-01-01 RX ORDER — SODIUM CHLORIDE 0.9 % (FLUSH) 0.9 %
10 SYRINGE (ML) INJECTION AS NEEDED
Status: CANCELLED | OUTPATIENT
Start: 2018-01-01

## 2018-01-01 RX ORDER — SODIUM CHLORIDE 9 MG/ML
250 INJECTION, SOLUTION INTRAVENOUS ONCE
Status: COMPLETED | OUTPATIENT
Start: 2018-01-01 | End: 2018-01-01

## 2018-01-01 RX ORDER — DIPHENOXYLATE HYDROCHLORIDE AND ATROPINE SULFATE 2.5; .025 MG/1; MG/1
1 TABLET ORAL DAILY
Status: DISCONTINUED | OUTPATIENT
Start: 2018-01-01 | End: 2018-01-01 | Stop reason: HOSPADM

## 2018-01-01 RX ORDER — SODIUM CHLORIDE 0.9 % (FLUSH) 0.9 %
1-10 SYRINGE (ML) INJECTION AS NEEDED
Status: DISCONTINUED | OUTPATIENT
Start: 2018-01-01 | End: 2018-01-01 | Stop reason: HOSPADM

## 2018-01-01 RX ORDER — PACLITAXEL 100 MG/20ML
75 INJECTION, POWDER, LYOPHILIZED, FOR SUSPENSION INTRAVENOUS ONCE
Status: COMPLETED | OUTPATIENT
Start: 2018-01-01 | End: 2018-01-01

## 2018-01-01 RX ORDER — PACLITAXEL 100 MG/20ML
75 INJECTION, POWDER, LYOPHILIZED, FOR SUSPENSION INTRAVENOUS ONCE
Status: CANCELLED | OUTPATIENT
Start: 2018-01-01

## 2018-01-01 RX ORDER — DEXTROSE AND SODIUM CHLORIDE 5; .9 G/100ML; G/100ML
125 INJECTION, SOLUTION INTRAVENOUS CONTINUOUS
Status: DISCONTINUED | OUTPATIENT
Start: 2018-01-01 | End: 2018-01-01

## 2018-01-01 RX ORDER — SODIUM CHLORIDE 9 MG/ML
250 INJECTION, SOLUTION INTRAVENOUS ONCE
Status: CANCELLED | OUTPATIENT
Start: 2018-05-15

## 2018-01-01 RX ORDER — CEFDINIR 300 MG/1
300 CAPSULE ORAL
Qty: 14 CAPSULE | Refills: 0 | Status: SHIPPED | OUTPATIENT
Start: 2018-01-01 | End: 2018-01-01

## 2018-01-01 RX ORDER — HYDRALAZINE HYDROCHLORIDE 25 MG/1
25 TABLET, FILM COATED ORAL 2 TIMES DAILY
COMMUNITY

## 2018-01-01 RX ORDER — SODIUM CHLORIDE 9 MG/ML
250 INJECTION, SOLUTION INTRAVENOUS ONCE
Status: CANCELLED | OUTPATIENT
Start: 2018-01-01

## 2018-01-01 RX ORDER — ONDANSETRON 2 MG/ML
4 INJECTION INTRAMUSCULAR; INTRAVENOUS EVERY 6 HOURS PRN
Status: DISCONTINUED | OUTPATIENT
Start: 2018-01-01 | End: 2018-01-01 | Stop reason: HOSPADM

## 2018-01-01 RX ORDER — CEFDINIR 300 MG/1
300 CAPSULE ORAL ONCE
Status: COMPLETED | OUTPATIENT
Start: 2018-01-01 | End: 2018-01-01

## 2018-01-01 RX ORDER — QUINAPRIL 10 MG/1
10 TABLET ORAL NIGHTLY
COMMUNITY
End: 2018-01-01 | Stop reason: HOSPADM

## 2018-01-01 RX ORDER — FERROUS SULFATE 325(65) MG
325 TABLET ORAL
COMMUNITY

## 2018-01-01 RX ORDER — HYDRALAZINE HYDROCHLORIDE 20 MG/ML
10 INJECTION INTRAMUSCULAR; INTRAVENOUS ONCE
Status: COMPLETED | OUTPATIENT
Start: 2018-01-01 | End: 2018-01-01

## 2018-01-01 RX ORDER — HYDROCODONE BITARTRATE AND ACETAMINOPHEN 5; 325 MG/1; MG/1
1 TABLET ORAL EVERY 6 HOURS PRN
Qty: 120 TABLET | Refills: 0 | Status: SHIPPED | OUTPATIENT
Start: 2018-01-01

## 2018-01-01 RX ORDER — ONDANSETRON 2 MG/ML
16 INJECTION INTRAMUSCULAR; INTRAVENOUS ONCE
Status: COMPLETED | OUTPATIENT
Start: 2018-01-01 | End: 2018-01-01

## 2018-01-01 RX ORDER — CLONIDINE HYDROCHLORIDE 0.1 MG/1
0.1 TABLET ORAL ONCE
Status: COMPLETED | OUTPATIENT
Start: 2018-01-01 | End: 2018-01-01

## 2018-01-01 RX ORDER — IPRATROPIUM BROMIDE AND ALBUTEROL SULFATE 2.5; .5 MG/3ML; MG/3ML
1.5 SOLUTION RESPIRATORY (INHALATION)
Status: DISCONTINUED | OUTPATIENT
Start: 2018-01-01 | End: 2018-01-01 | Stop reason: HOSPADM

## 2018-01-01 RX ADMIN — CEFDINIR 300 MG: 300 CAPSULE ORAL at 20:20

## 2018-01-01 RX ADMIN — SODIUM CHLORIDE 250 ML: 9 INJECTION, SOLUTION INTRAVENOUS at 13:23

## 2018-01-01 RX ADMIN — PACLITAXEL 125 MG: 100 INJECTION, POWDER, LYOPHILIZED, FOR SUSPENSION INTRAVENOUS at 14:14

## 2018-01-01 RX ADMIN — SODIUM CHLORIDE 1000 ML/HR: 9 INJECTION, SOLUTION INTRAVENOUS at 13:44

## 2018-01-01 RX ADMIN — ONDANSETRON 16 MG: 2 INJECTION INTRAMUSCULAR; INTRAVENOUS at 13:29

## 2018-01-01 RX ADMIN — DEXAMETHASONE SODIUM PHOSPHATE 12 MG: 4 INJECTION, SOLUTION INTRAMUSCULAR; INTRAVENOUS at 13:51

## 2018-01-01 RX ADMIN — PACLITAXEL 125 MG: 100 INJECTION, POWDER, LYOPHILIZED, FOR SUSPENSION INTRAVENOUS at 15:15

## 2018-01-01 RX ADMIN — PACLITAXEL 125 MG: 100 INJECTION, POWDER, LYOPHILIZED, FOR SUSPENSION INTRAVENOUS at 15:40

## 2018-01-01 RX ADMIN — DEXAMETHASONE SODIUM PHOSPHATE 12 MG: 4 INJECTION, SOLUTION INTRAMUSCULAR; INTRAVENOUS at 13:23

## 2018-01-01 RX ADMIN — HEPARIN 300 UNITS: 100 SYRINGE at 20:35

## 2018-01-01 RX ADMIN — IPRATROPIUM BROMIDE AND ALBUTEROL SULFATE 1.5 ML: 2.5; .5 SOLUTION RESPIRATORY (INHALATION) at 19:19

## 2018-01-01 RX ADMIN — HEPARIN 500 UNITS: 100 SYRINGE at 16:23

## 2018-01-01 RX ADMIN — HEPARIN 500 UNITS: 100 SYRINGE at 16:28

## 2018-01-01 RX ADMIN — SODIUM CHLORIDE 250 ML: 9 INJECTION, SOLUTION INTRAVENOUS at 14:59

## 2018-01-01 RX ADMIN — IPRATROPIUM BROMIDE AND ALBUTEROL SULFATE 1.5 ML: 2.5; .5 SOLUTION RESPIRATORY (INHALATION) at 06:52

## 2018-01-01 RX ADMIN — Medication 10 ML: at 16:26

## 2018-01-01 RX ADMIN — HEPARIN 500 UNITS: 100 SYRINGE at 14:39

## 2018-01-01 RX ADMIN — HEPARIN 500 UNITS: 100 SYRINGE at 15:06

## 2018-01-01 RX ADMIN — DEXAMETHASONE SODIUM PHOSPHATE 12 MG: 4 INJECTION, SOLUTION INTRAMUSCULAR; INTRAVENOUS at 14:59

## 2018-01-01 RX ADMIN — IPRATROPIUM BROMIDE AND ALBUTEROL SULFATE 1.5 ML: 2.5; .5 SOLUTION RESPIRATORY (INHALATION) at 06:48

## 2018-01-01 RX ADMIN — HEPARIN 500 UNITS: 100 SYRINGE at 15:58

## 2018-01-01 RX ADMIN — Medication 10 ML: at 15:15

## 2018-01-01 RX ADMIN — Medication 10 ML: at 15:58

## 2018-01-01 RX ADMIN — CLONIDINE HYDROCHLORIDE 0.1 MG: 0.1 TABLET ORAL at 18:59

## 2018-01-01 RX ADMIN — IPRATROPIUM BROMIDE AND ALBUTEROL SULFATE 1.5 ML: 2.5; .5 SOLUTION RESPIRATORY (INHALATION) at 12:02

## 2018-01-01 RX ADMIN — IOPAMIDOL 95 ML: 612 INJECTION, SOLUTION INTRAVENOUS at 16:14

## 2018-01-01 RX ADMIN — Medication 1 TABLET: at 10:00

## 2018-01-01 RX ADMIN — Medication 1 TABLET: at 10:27

## 2018-01-01 RX ADMIN — DEXAMETHASONE SODIUM PHOSPHATE 12 MG: 4 INJECTION, SOLUTION INTRAMUSCULAR; INTRAVENOUS at 14:30

## 2018-01-01 RX ADMIN — SODIUM CHLORIDE 250 ML: 9 INJECTION, SOLUTION INTRAVENOUS at 15:20

## 2018-01-01 RX ADMIN — SODIUM CHLORIDE 500 ML: 9 INJECTION, SOLUTION INTRAVENOUS at 17:29

## 2018-01-01 RX ADMIN — IPRATROPIUM BROMIDE AND ALBUTEROL SULFATE 1.5 ML: 2.5; .5 SOLUTION RESPIRATORY (INHALATION) at 13:00

## 2018-01-01 RX ADMIN — Medication 10 ML: at 16:23

## 2018-01-01 RX ADMIN — HEPARIN 500 UNITS: 100 SYRINGE at 14:48

## 2018-01-01 RX ADMIN — IOPAMIDOL 85 ML: 612 INJECTION, SOLUTION INTRAVENOUS at 12:15

## 2018-01-01 RX ADMIN — HEPARIN 500 UNITS: 100 SYRINGE at 14:42

## 2018-01-01 RX ADMIN — HEPARIN 500 UNITS: 100 SYRINGE at 15:15

## 2018-01-01 RX ADMIN — DEXTROSE AND SODIUM CHLORIDE 125 ML/HR: 5; 900 INJECTION, SOLUTION INTRAVENOUS at 07:54

## 2018-01-01 RX ADMIN — PACLITAXEL 125 MG: 100 INJECTION, POWDER, LYOPHILIZED, FOR SUSPENSION INTRAVENOUS at 14:17

## 2018-01-01 RX ADMIN — HYDRALAZINE HYDROCHLORIDE 10 MG: 20 INJECTION INTRAMUSCULAR; INTRAVENOUS at 17:30

## 2018-01-01 RX ADMIN — DEXAMETHASONE SODIUM PHOSPHATE 12 MG: 4 INJECTION, SOLUTION INTRAMUSCULAR; INTRAVENOUS at 15:20

## 2018-01-01 RX ADMIN — HYDROCODONE BITARTRATE AND ACETAMINOPHEN 1 TABLET: 5; 325 TABLET ORAL at 13:54

## 2018-01-01 RX ADMIN — HYDRALAZINE HYDROCHLORIDE 10 MG: 20 INJECTION INTRAMUSCULAR; INTRAVENOUS at 18:15

## 2018-01-01 RX ADMIN — PACLITAXEL 125 MG: 100 INJECTION, POWDER, LYOPHILIZED, FOR SUSPENSION INTRAVENOUS at 14:50

## 2018-01-01 RX ADMIN — Medication 10 ML: at 15:05

## 2018-01-01 RX ADMIN — DEXAMETHASONE SODIUM PHOSPHATE 12 MG: 4 INJECTION, SOLUTION INTRAMUSCULAR; INTRAVENOUS at 13:59

## 2018-01-01 RX ADMIN — SODIUM CHLORIDE 125 ML/HR: 9 INJECTION, SOLUTION INTRAVENOUS at 18:12

## 2018-01-01 RX ADMIN — SODIUM CHLORIDE 250 ML: 9 INJECTION, SOLUTION INTRAVENOUS at 13:50

## 2018-01-01 RX ADMIN — ENOXAPARIN SODIUM 40 MG: 100 INJECTION SUBCUTANEOUS at 18:05

## 2018-01-01 RX ADMIN — PACLITAXEL 125 MG: 100 INJECTION, POWDER, LYOPHILIZED, FOR SUSPENSION INTRAVENOUS at 13:48

## 2018-01-01 RX ADMIN — IPRATROPIUM BROMIDE AND ALBUTEROL SULFATE 1.5 ML: 2.5; .5 SOLUTION RESPIRATORY (INHALATION) at 19:49

## 2018-01-01 RX ADMIN — HEPARIN 500 UNITS: 100 SYRINGE at 15:05

## 2018-01-16 NOTE — PROGRESS NOTES
DATE OF VISIT: 1/16/2018    REASON FOR VISIT: Followup for   1. Breast cancer:   a) Initially presenting as stage IIB disease, T2N1M0, estrogen receptor  strongly positive, progesterone receptor intermediate, HER-2/jean marie negative.   b) Status post right mastectomy followed by adjuvant chemotherapy using  Taxotere and Cytoxan 4 cycles.   c) Took Arimidex 1 mg p.o. daily from 01/10/2013 until 04/28/2015.   2. Locally relapsed disease with right chest wall as well as axillary lymph  nodes biopsy proven metastatic disease done 04/17/2015.  3. Enrolled on clinical trial [employer]Co[employer]  randomizing patients for 2nd line  hormone treatment with Aromasin with placebo versus Aromasin with Entinostat.   4. Currently on Aromasin single agent.   5. Completed adjuvant radiation treatment by Dr. Benavides to the right chest  wall as well as right axillary lymph nodes 12/09/2015.   6. Currently metastatic disease with new liver metastases documented on CAT  scan 04/22/2016.   7. Started Faslodex Ibrance 05/02/2016.   8. Switched to Carbo/gemzar weekly secondary to progressive disease from 07/22/2016 till 10/11/2016.  9. Started gemzar single agent weekly 2 weeks on and 1 week off 10/25/2016.  10.  Started weekly Adriamycin single agent on February 14, 2017   11. Changed to Doxil single agent every 4 weeks on May 30, 2017.   12.  Progressive disease documented CAT scan done August 9, 2017  13.  Started Halaven in August 21, 2017  14.  Progressive disease documented CAT scan done January 16, 2018  15.  Treatment changed to Abraxane 3 weeks on one week off    HISTORY OF PRESENT ILLNESS: The patient is a very pleasant 75-year-old female  with past medical history significant for invasive ductal carcinoma of the  right breast diagnosed 06/21/2012. The patient is status post right mastectomy  on 07/13/2012 with lymph node dissection, tumor greatest dimension was 2.5 cm,  2 out of 8 positive axial lymph nodes, clear surgical margins. The patient  was  started on adjuvant chemotherapy using Taxotere and Cytoxan on 10/08/2012. The  patient completed her treatment on 12/10/2012. The patient was started on  adjuvant hormone treatment using Arimidex 1 mg p.o. daily 01/10/2013. The  patient presented with right chest wall mass. Biopsy done on 04/17/2015  revealed relapsed high-grade ductal carcinoma. She had CAT scan of the chest,  abdomen, and pelvis that revealed disease in the right chest wall as well as  right axilla. The patient was enrolled with ECoG  Aromasin with or without  Entinostat on 05/08/2015. The patient had progressive disease documented on  scans done 04/22/2016 with liver metastases. The patient was started on  Faslodex Ibrance on 05/02/2016. Repeat CT scan done 7/22/2016 showed continued progression of disease, and the patient's treatment was changed to carbo/gemzar 2 weeks on, 1 week off. Completed 5 cycles on 10/11/2016. shw started Gemzar single agent 10/25/2016.  Repeated CAT scan done February 3, 2017 showed progressive liver metastases.  Patient was switched to weekly Adriamycin started on February 14, 2017. She had stable disease documented on CT scans done 5/26/2017 and was switched to Doxil given every 4 weeks.  Repeated scan done August 9, 2017 revealed progressive disease.  Patient was switched to Halaven August 21, 2017.  She completed 6 cycles.  Repeated scans done on January 16, 2018 revealed mixed response with increase in size of liver metastases and right axillary lymph nodes.  She is here today to discuss future treatment options.      SUBJECTIVE: The patient is complaining of pain in right axilla as well as left knee.  She is requesting a refill of her pain medicine.  She's been having poor appetite with some weight loss.  She is having fatigue.  She has mild nausea but no vomiting.    PAST MEDICAL HISTORY/SOCIAL HISTORY/FAMILY HISTORY: Unchanged from my prior documentation done on 10/2/2012.     Review of Systems    Constitutional: Positive for fatigue. Negative for activity change, chills, fever and unexpected weight change.   HENT: Negative for hearing loss, mouth sores, nosebleeds, sore throat and trouble swallowing.    Eyes: Negative for visual disturbance.   Respiratory: Positive for cough. Negative for chest tightness, shortness of breath and wheezing.    Cardiovascular: Negative for chest pain, palpitations and leg swelling.   Gastrointestinal: Negative for abdominal distention, abdominal pain, blood in stool, constipation, diarrhea, nausea, rectal pain and vomiting.        Poor appetite   Endocrine: Negative for cold intolerance and heat intolerance.   Genitourinary: Negative for difficulty urinating, dysuria, frequency and urgency.   Musculoskeletal: Positive for arthralgias. Negative for back pain, gait problem and myalgias.   Skin: Negative for rash.   Neurological: Positive for weakness. Negative for dizziness, tremors, syncope, light-headedness, numbness and headaches.   Hematological: Negative for adenopathy. Does not bruise/bleed easily.   Psychiatric/Behavioral: Negative for confusion, sleep disturbance and suicidal ideas. The patient is not nervous/anxious.          Current Outpatient Prescriptions:   •  albuterol (VENTOLIN HFA) 108 (90 BASE) MCG/ACT inhaler, Inhale 2 puffs Every 6 (Six) Hours As Needed for Wheezing or Shortness of Air., Disp: 18 g, Rfl: 5  •  amLODIPine (NORVASC) 10 MG tablet, Take 10 mg by mouth daily., Disp: , Rfl:   •  ferrous sulfate 325 (65 FE) MG tablet, Take 325 mg by mouth 3 (Three) Times a Day With Meals., Disp: , Rfl:   •  fluticasone (FLONASE) 50 MCG/ACT nasal spray, 1 spray into each nostril Daily., Disp: 15.8 mL, Rfl: 0  •  HYDROcodone-acetaminophen (NORCO) 5-325 MG per tablet, Take 1 tablet by mouth Every 6 (Six) Hours As Needed for Moderate Pain ., Disp: 120 tablet, Rfl: 0  •  ipratropium-albuterol (DUO-NEB) 0.5-2.5 mg/mL nebulizer, inhale contents of 1 vial every 8 hours in  "nebulizer, Disp: , Rfl: 0  •  lidocaine-prilocaine (EMLA) 2.5-2.5 % cream, Apply  topically As Needed for mild pain (1-3) (prior to port access). (Patient taking differently: Apply 1 application topically As Needed for Mild Pain (1-3) (prior to port access).), Disp: 30 g, Rfl: 2  •  megestrol (MEGACE) 40 MG/ML suspension, Take 20 mL by mouth Daily., Disp: 480 mL, Rfl: 5  •  Multiple Vitamin (TAB-A-TESS) tablet, Take 1 tablet by mouth daily., Disp: , Rfl: 0  •  ondansetron (ZOFRAN) 8 MG tablet, Take 1 tablet by mouth 3 (Three) Times a Day As Needed for Nausea or Vomiting., Disp: 30 tablet, Rfl: 5  •  ondansetron ODT (ZOFRAN-ODT) 4 MG disintegrating tablet, dissolve 1 tablet under the tongue every 12 hours if needed before meals for nausea, Disp: , Rfl: 0  •  polyethylene glycol (MIRALAX) packet, Take 17 g by mouth Daily., Disp: 225 g, Rfl: 1  •  promethazine (PHENERGAN) 25 MG tablet, take 1 tablet by mouth every 12 hours before meals if needed, Disp: , Rfl: 0  •  quinapril (ACCUPRIL) 10 MG tablet, Take 10 mg by mouth Every Night., Disp: , Rfl:   No current facility-administered medications for this visit.     PHYSICAL EXAMINATION:   Ht 162.6 cm (64\")   ECOG Performance Status: 1  General Appearance:  alert, cooperative, no apparent distress and appears stated age   Neurologic/Psychiatric: A&O x 3, gait steady, appropriate affect, strength 5/5 in all muscle groups   HEENT:  Normocephalic, without obvious abnormality, mucous membranes moist   Neck: Supple, symmetrical, trachea midline, no adenopathy;  No thyromegaly, masses, or tenderness   Lungs:   Clear to auscultation bilaterally; respirations regular, even, and unlabored bilaterally   Heart:  Regular rate and rhythm, no murmurs appreciated   Abdomen:   Soft, non-tender, non-distended and no organomegaly   Lymph nodes: No cervical, supraclavicular, inguinal or axillary adenopathy noted   Extremities: Normal, atraumatic; no clubbing, cyanosis, or edema    Skin: No " rashes, ulcers, or suspicious lesions noted     Hospital Outpatient Visit on 01/15/2018   Component Date Value Ref Range Status   • Glucose 01/15/2018 73  70 - 100 mg/dL Final   • BUN 01/15/2018 12  9 - 23 mg/dL Final   • Creatinine 01/15/2018 0.70  0.60 - 1.30 mg/dL Final   • Sodium 01/15/2018 142  132 - 146 mmol/L Final   • Potassium 01/15/2018 3.8  3.5 - 5.5 mmol/L Final   • Chloride 01/15/2018 102  99 - 109 mmol/L Final   • CO2 01/15/2018 30.0  20.0 - 31.0 mmol/L Final   • Calcium 01/15/2018 8.7  8.7 - 10.4 mg/dL Final   • Total Protein 01/15/2018 6.7  5.7 - 8.2 g/dL Final   • Albumin 01/15/2018 3.50  3.20 - 4.80 g/dL Final   • ALT (SGPT) 01/15/2018 26  7 - 40 U/L Final   • AST (SGOT) 01/15/2018 61* 0 - 33 U/L Final   • Alkaline Phosphatase 01/15/2018 121* 25 - 100 U/L Final   • Total Bilirubin 01/15/2018 0.3  0.3 - 1.2 mg/dL Final   • eGFR   Amer 01/15/2018 98  >60 mL/min/1.73 Final   • Globulin 01/15/2018 3.2  gm/dL Final   • A/G Ratio 01/15/2018 1.1* 1.5 - 2.5 g/dL Final   • BUN/Creatinine Ratio 01/15/2018 17.1  7.0 - 25.0 Final   • Anion Gap 01/15/2018 10.0  3.0 - 11.0 mmol/L Final   • Magnesium 01/15/2018 1.6  1.3 - 2.7 mg/dL Final   • WBC 01/15/2018 5.67  3.50 - 10.80 10*3/mm3 Final   • RBC 01/15/2018 3.57* 3.89 - 5.14 10*6/mm3 Final   • Hemoglobin 01/15/2018 9.8* 11.5 - 15.5 g/dL Final   • Hematocrit 01/15/2018 32.9* 34.5 - 44.0 % Final   • MCV 01/15/2018 92.2  80.0 - 99.0 fL Final   • MCH 01/15/2018 27.5  27.0 - 31.0 pg Final   • MCHC 01/15/2018 29.8* 32.0 - 36.0 g/dL Final   • RDW 01/15/2018 19.1* 11.3 - 14.5 % Final   • RDW-SD 01/15/2018 64.6* 37.0 - 54.0 fl Final   • MPV 01/15/2018 10.6  6.0 - 12.0 fL Final   • Platelets 01/15/2018 239  150 - 450 10*3/mm3 Final   • Neutrophil % 01/15/2018 59.2  41.0 - 71.0 % Final   • Lymphocyte % 01/15/2018 30.2  24.0 - 44.0 % Final   • Monocyte % 01/15/2018 8.8  0.0 - 12.0 % Final   • Eosinophil % 01/15/2018 0.7  0.0 - 3.0 % Final   • Basophil % 01/15/2018  0.2  0.0 - 1.0 % Final   • Immature Grans % 01/15/2018 0.9* 0.0 - 0.6 % Final   • Neutrophils, Absolute 01/15/2018 3.36  1.50 - 8.30 10*3/mm3 Final   • Lymphocytes, Absolute 01/15/2018 1.71  0.60 - 4.80 10*3/mm3 Final   • Monocytes, Absolute 01/15/2018 0.50  0.00 - 1.00 10*3/mm3 Final   • Eosinophils, Absolute 01/15/2018 0.04  0.00 - 0.30 10*3/mm3 Final   • Basophils, Absolute 01/15/2018 0.01  0.00 - 0.20 10*3/mm3 Final   • Immature Grans, Absolute 01/15/2018 0.05* 0.00 - 0.03 10*3/mm3 Final   Infusion on 01/03/2018   Component Date Value Ref Range Status   • Glucose 01/03/2018 73  70 - 100 mg/dL Final   • BUN 01/03/2018 9  9 - 23 mg/dL Final   • Creatinine 01/03/2018 0.80  0.60 - 1.30 mg/dL Final   • Sodium 01/03/2018 143  132 - 146 mmol/L Final   • Potassium 01/03/2018 3.5  3.5 - 5.5 mmol/L Final   • Chloride 01/03/2018 102  99 - 109 mmol/L Final   • CO2 01/03/2018 34.0* 20.0 - 31.0 mmol/L Final   • Calcium 01/03/2018 8.8  8.7 - 10.4 mg/dL Final   • eGFR   Amer 01/03/2018 84  >60 mL/min/1.73 Final   • BUN/Creatinine Ratio 01/03/2018 11.3  7.0 - 25.0 Final   • Anion Gap 01/03/2018 7.0  3.0 - 11.0 mmol/L Final   • Magnesium 01/03/2018 1.5  1.3 - 2.7 mg/dL Final   • WBC 01/03/2018 2.20* 3.50 - 10.80 10*3/mm3 Final   • RBC 01/03/2018 3.54* 3.89 - 5.14 10*6/mm3 Final   • Hemoglobin 01/03/2018 9.5* 11.5 - 15.5 g/dL Final   • Hematocrit 01/03/2018 31.5* 34.5 - 44.0 % Final   • RDW 01/03/2018 20.7* 11.3 - 14.5 % Final   • MCV 01/03/2018 89.1  80.0 - 99.0 fL Final   • MCH 01/03/2018 26.9* 27.0 - 31.0 pg Final   • MCHC 01/03/2018 30.2* 32.0 - 36.0 g/dL Final   • MPV 01/03/2018 7.4  6.0 - 12.0 fL Final   • Platelets 01/03/2018 212  150 - 450 10*3/mm3 Final   • Neutrophil % 01/03/2018 16.0* 41.0 - 71.0 % Final   • Lymphocyte % 01/03/2018 77.0* 24.0 - 44.0 % Final   • Monocyte % 01/03/2018 7.0  0.0 - 12.0 % Final   • Neutrophils, Absolute 01/03/2018 0.40* 1.50 - 8.30 10*3/mm3 Final   • Lymphocytes, Absolute 01/03/2018  1.70  0.60 - 4.80 10*3/mm3 Final   • Monocytes, Absolute 01/03/2018 0.20  0.00 - 1.00 10*3/mm3 Final   • Creatinine 01/03/2018 0.90  0.60 - 1.30 mg/dL Final    Serial Number: [id number]:  [id number]        Ct Chest With Contrast    Result Date: 1/16/2018  Narrative: EXAMINATION: CT CHEST W CONTRAST-, CT ABDOMEN AND PELVIS W CONTRAST-01/15/2018:  INDICATION: F/U scan; C50.111-Malignant neoplasm of central portion of right female breast.     TECHNIQUE: Spiral acquisition 5 mm post-IV contrast images through the neck and 5 mm post-IV contrast portal venous phase and delayed venous phase images through the abdomen and pelvis.  The radiation dose reduction device was turned on for each scan per the ALARA (As Low as Reasonably Achievable) protocol.  COMPARISON: 10/20/2017 chest, abdomen and pelvis CT scans.  FINDINGS: Previous exam reports indicate widespread liver metastatic disease, but improved from August 2017 exam. Improving left lung mass and left axillary lymph nodes.  CHEST CT SCAN WITH IV CONTRAST:  A small right subcutaneous postop seroma is stable, approximately 3 cm in diameter. Dense cutaneous thickening of the left breast is again noted, as well as nonspecific dense central breast tissue. No discrete focal mass is appreciated. There has been interval enlargement of a single left axillary lymph node, previously approximately 4 mm in diameter and today approximately 13 mm in diameter. No right axillary adenopathy or mediastinal adenopathy is appreciated. There is no pericardial or pleural effusion.  Lung window images show stable mild linear scarring in the lingula, anterior left upper lobe, and minimal subpleural thickening in the anterior right upper lobe. There is no evidence of pulmonary parenchymal mass or other new pulmonary parenchymal disease. The bony structures appear grossly intact.      Impression: 1.  Interval enlargement of a single left axillary lymph node, now approximately 13 mm in diameter.  2.  Stable mild bilateral lung scarring. No new or progressive chest disease is seen elsewhere.  ABDOMEN AND PELVIS CT SCAN WITH IV CONTRAST:  Compared to the previous 10/20/2017 exam, there appears to be mixed response to treatment, some lesions improved and some lesions worsening. A fairly discrete segment 7 2.5 cm lesion on the previous exam now appears to extend in a circumferential and discontinuous pattern over a roughly 4.6 cm area. An irregular segment 4B/5 infiltrating lesion appears improved from 6.7 x 7.0 cm to 5.0 x 3.1 cm. Discrete 3.2 cm segment 6 lesion appears improved to 2.3 cm.  The adrenal glands remain normal in size. No significant abnormalities are seen of the spleen, pancreas, the contracted gallbladder, or adrenal glands. The kidneys appear unremarkable except for multiple small renal cysts. No upper abdominal adenopathy, ascites, or acute inflammatory change is appreciated.  Regarding the lower abdomen and pelvis, bowel loops are normal in caliber and normal in appearance. No peritoneal disease or adenopathy is seen. There is only a trace amount of intrapelvic free fluid.  Bony structures appear grossly intact.  IMPRESSION: 1.  Mixed response by the liver, with most lesions decreasing in size, one right lobe liver lesion significantly increased, since 10/20/2017. 2.  No evidence of metastatic disease or other new disease elsewhere in the abdomen or pelvis.  D:  01/16/2018 E:  01/16/2018        Ct Abdomen Pelvis With Contrast    Result Date: 1/16/2018  Narrative: EXAMINATION: CT CHEST W CONTRAST-, CT ABDOMEN AND PELVIS W CONTRAST-01/15/2018:  INDICATION: F/U scan; C50.111-Malignant neoplasm of central portion of right female breast.     TECHNIQUE: Spiral acquisition 5 mm post-IV contrast images through the neck and 5 mm post-IV contrast portal venous phase and delayed venous phase images through the abdomen and pelvis.  The radiation dose reduction device was turned on for each scan per the  ALARA (As Low as Reasonably Achievable) protocol.  COMPARISON: 10/20/2017 chest, abdomen and pelvis CT scans.  FINDINGS: Previous exam reports indicate widespread liver metastatic disease, but improved from August 2017 exam. Improving left lung mass and left axillary lymph nodes.  CHEST CT SCAN WITH IV CONTRAST:  A small right subcutaneous postop seroma is stable, approximately 3 cm in diameter. Dense cutaneous thickening of the left breast is again noted, as well as nonspecific dense central breast tissue. No discrete focal mass is appreciated. There has been interval enlargement of a single left axillary lymph node, previously approximately 4 mm in diameter and today approximately 13 mm in diameter. No right axillary adenopathy or mediastinal adenopathy is appreciated. There is no pericardial or pleural effusion.  Lung window images show stable mild linear scarring in the lingula, anterior left upper lobe, and minimal subpleural thickening in the anterior right upper lobe. There is no evidence of pulmonary parenchymal mass or other new pulmonary parenchymal disease. The bony structures appear grossly intact.      Impression: 1.  Interval enlargement of a single left axillary lymph node, now approximately 13 mm in diameter. 2.  Stable mild bilateral lung scarring. No new or progressive chest disease is seen elsewhere.  ABDOMEN AND PELVIS CT SCAN WITH IV CONTRAST:  Compared to the previous 10/20/2017 exam, there appears to be mixed response to treatment, some lesions improved and some lesions worsening. A fairly discrete segment 7 2.5 cm lesion on the previous exam now appears to extend in a circumferential and discontinuous pattern over a roughly 4.6 cm area. An irregular segment 4B/5 infiltrating lesion appears improved from 6.7 x 7.0 cm to 5.0 x 3.1 cm. Discrete 3.2 cm segment 6 lesion appears improved to 2.3 cm.  The adrenal glands remain normal in size. No significant abnormalities are seen of the spleen,  pancreas, the contracted gallbladder, or adrenal glands. The kidneys appear unremarkable except for multiple small renal cysts. No upper abdominal adenopathy, ascites, or acute inflammatory change is appreciated.  Regarding the lower abdomen and pelvis, bowel loops are normal in caliber and normal in appearance. No peritoneal disease or adenopathy is seen. There is only a trace amount of intrapelvic free fluid.  Bony structures appear grossly intact.  IMPRESSION: 1.  Mixed response by the liver, with most lesions decreasing in size, one right lobe liver lesion significantly increased, since 10/20/2017. 2.  No evidence of metastatic disease or other new disease elsewhere in the abdomen or pelvis.  D:  01/16/2018 E:  01/16/2018          ASSESSMENT: The patient is a very pleasant 75-year-old female with right breast  cancer.     PROBLEM LIST:  1. T2N1M0 invasive ductal carcinoma of the right breast, tumor greatest  dimension 2.5 cm, estrogen receptor strongly positive, progesterone receptor  intermediate, HER-2/jean marie negative by IHC score of 0, 2 out of 8 positive  axillary lymph nodes.   2. Status post right mastectomy with axillary lymph node dissection done by  Dr. Heart on 07/30/2012.   3. Status post 4 cycles of adjuvant chemotherapy using Taxotere and Cytoxan  from 10/08/2012 until 12/10/2012.   4. Took Arimidex 1 mg p.o. daily from 01/10/2013 until 04/28/2015.   5. Relapsed disease documented on CAT scan of the chest, abdomen, and pelvis  done 04/27/2015. Revealed right chest wall mass as well as right axillary  lymphadenopathy. Biopsy done 04/17/2015 revealed invasive ductal carcinoma.   6. Enrolled on ECoG  protocol, Aromasin with placebo versus Aromasin and  Entinostat, 05/08/2015.   7. Stopped Aromasin 04/25/2016 secondary to new liver metastases documented on  CAT scan 04/22/2016.   8. Completed adjuvant radiation treatment to the right chest wall 12/09/2015.  9. Status post right axillary dissection done  by Dr. Heart 03/14/2016.   10. Progressive disease documented on CAT scan done 04/22/2016 with new liver  metastases.   11. Started Faslodex Ibrance 05/02/2016.  12.  Worsening metastatic disease documented on CT scans done on 07/25/2016.  13. Started on carboplatin AUC 2 with gemzar given two weeks on, one week off. She is status post three cycle of treatment.   14. Mixed response to treatment documented on CAT scans done 11/15/2016.  15. On gemzar single agent, status post 6 cycles.  16.  Progressive disease documented on CAT scan done February 4, 2017.  17.  Started weekly Adriamycin February 14, 2017.   18. Changed to maintenance Doxil after maximum response from weekly Adriamycin on 5/30/2017.   19. Progressive disease documented on CAT scan done on August 9, 2017  20. Started Halaven August 21, 2017, status post 6 cycles  21.  Progressive disease documented on CAT scan done on January 16, 2018  22.  Treatment was switched to Abraxane weekly that we'll start January 23, 2018  23.  Rheumatoid arthritis  24.  Treatment induced pancytopenia    PLAN:  1.  I did go over the CAT scan results with the patient, reviewed the films myself.  The patient that she has evidence of progression.  2.  Given the fact the patient is interested in palliative treatment and she continued to have good performance status I will switch her to Abraxane weekly, we'll do 25% dose reduction secondary to pancytopenia.  3. She will continue use of EMLA cream prior to port access.  4. The patient will continue to hold her methotrexate as she is on active cancer treatment.  5. We will continue to monitor the patient's blood counts, kidney function, and liver function throughout treatment.   6. We will continue Norco 5/325 mg 1 tablet taken every 6 hours as needed for cancer-related pain.  I will go and refill it today.    7. The patient will need repeat CAT scan after cycle # 3.   8.  We reviewed the side effects of this regimen including  nausea, fatigue, myelosuppression,  peripheral neuropathy, hepatotoxicity, myelodysplasia, alopecia, constipation or diarrhea, and potential death.  9.  The patient will follow up with me in 3 weeks for n cycle 1 day 15.       France Chun MD,     1/16/2018  1:37 PM

## 2018-01-16 NOTE — PLAN OF CARE
Outpatient Infusion • [address] [facility] • [address] • [place] [id number] • [phone]      CHEMOTHERAPY EDUCATION SHEET    NAME:  Nava Cline      : 1940           DATE: 18    Booklets Given: Chemotherapy and You []  Eating Hints []    Sexuality/Fertility Books []     Chemotherapy/Biotherapy Education Sheets: (list all that apply)  Abraxane Chemocare                                                                                                                                                                Chemotherapy Regimen:  Abraxane IV weekly x 3 weeks, off x 1 week (Starting next week)    TOPICS EDUCATION PROVIDED EDUCATION REINFORCED COMMENTS   ANEMIA:  role of RBC, cause, s/s, ways to manage, role of transfusion [x] [] Discussed role of RBCs and potential for fatigue.   THROMBOCYTOPENIA:  role of platelet, cause, s/s, ways to prevent bleeding, things to avoid, when to seek help [x] [] Discussed the role of platelets and the s&s of bleeding.   NEUTROPENIA:  role of WBC, cause, infection precautions, s/s of infection, when to call MD [x] [] Discussed the role of WBCs and  Potential for infection. Patient knows to monitor temperature and contact clinic if >100.4.   NUTRITION & APPETITE CHANGES:  importance of maintaining healthy diet & weight, ways to manage to improve intake, dietary consult, exercise regimen [] []    DIARRHEA:  causes, s/s of dehydration, ways to manage, dietary changes, when to call MD [x] [] Discussed potential for diarrhea and treatment options.   CONSTIPATION:  causes, ways to manage, dietary changes, when to call MD [x] [] Discussed potential for constipation and treatment options.   NAUSEA & VOMITING:  cause, use of antiemetics, dietary changes, when to call MD [x] [] Discussed emetic potential.   MOUTH SORES:  causes, oral care, ways to manage [x] [] Discussed potential for mouth sores.   ALOPECIA:  cause, ways to manage, resources [x] [] Discussed potential  for hair loss.   INFERTILITY & SEXUALITY:  causes, fertility preservation options, sexuality changes, ways to manage, importance of birth control [] []    NERVOUS SYSTEM CHANGES:  causes, s/s, neuropathies, cognitive changes, ways to manage [x] [] Discussed the potential of neuropathy with Abraxane. Patient has not experienced neuropathy to this point.   PAIN:  causes, ways to manage [x] [] Discussed pain control. Patien states Norco controls pain well. New Rx written for Norco.   SKIN & NAIL CHANGES:  cause, s/s, ways to manage [x] [] Discussed potential for rash and treatment options.   ORGAN TOXICITIES:  cause, s/s, need for diagnostic tests, labs, when to notify MD [] []    SURVIVORSHIP:  distress, distress assessment, secondary malignancies, early/late effects, follow-up, social issues, social support [] []    HOME CARE:  use of spill kits, storing of PO chemo, how to manage bodily fluids [] []    MISCELLANEOUS:  drug interactions, administration, vesicant, et [x] [] Discussed chemotherapy regimen and infusion schedule.     Referrals:        Notes:   Patient did not have any questions at this time. Instructed patient to contact the clinic if she had new questions or concerns.    Thanks,    Junior Thomson, PharmD Candidate 2018

## 2018-01-17 NOTE — ED PROVIDER NOTES
Subjective   HPI Comments: Patient presents to the emergency department at the direction of her oncology office with whom she had an appointment today.  It was noticed that she had an elevated blood pressure reading and she was encouraged to seek medical attention at our facility as a result.  Other history reveals that Ms. Cline has a history of hypertension for which she was on dual therapy with amlodipine and quinapril for many years.  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  In October, her primary care provider opted to discontinue her amlodipine due to improved blood pressure associated with her weight loss.    Patient has been tolerating chemotherapy very well since late last year and other than some weight loss and hair loss, she states she feels well, she eats well with a hearty appetite and has no fevers.  She continues to take her quinapril as directed.  Patient denies any chest pain or shortness of breath.  She denies any pedal edema or dyspnea on exertion.    Patient is a 77 y.o. female presenting with hypertension.   History provided by:  Patient   used: No    Hypertension   Severity:  Moderate  Onset quality:  Unable to specify  Timing:  Unable to specify  Chronicity:  Chronic  Context: medication change    Context: normal sodium and not drug abuse    Worsened by:  Nothing  Associated symptoms: no abdominal pain, no chest pain, no confusion, no dizziness, no fatigue, no fever, no headaches, no loss of consciousness, no nausea, no neck pain, no palpitations, no shortness of breath and not vomiting    Risk factors: cardiac disease and kidney disease    Risk factors: no prior stroke        Review of Systems   Constitutional: Negative.  Negative for diaphoresis, fatigue and fever.   HENT: Negative for sore throat.    Eyes: Negative.  Negative for pain and visual disturbance.   Respiratory: Negative for cough,  shortness of breath, wheezing and stridor.    Cardiovascular: Negative.  Negative for chest pain and palpitations.   Gastrointestinal: Negative.  Negative for abdominal pain, diarrhea, nausea and vomiting.   Endocrine: Negative.    Genitourinary: Negative.  Negative for dysuria.   Musculoskeletal: Negative.  Negative for back pain and neck pain.   Skin: Negative.  Negative for pallor and rash.   Allergic/Immunologic: Negative.    Neurological: Negative.  Negative for dizziness, loss of consciousness, syncope and headaches.   Hematological: Negative.    Psychiatric/Behavioral: Negative.  Negative for agitation and confusion.   All other systems reviewed and are negative.      Past Medical History:   Diagnosis Date   • Arthritis    • Breast cancer 07/2012   • Disease of thyroid gland    • Drug therapy    • Hx of radiation therapy    • Hypertension    • Liver metastasis 8/17/2016   • Osteoporosis        Allergies   Allergen Reactions   • Penicillin V Potassium [Penicillin V] Hives       Past Surgical History:   Procedure Laterality Date   • BREAST BIOPSY     • MASTECTOMY Right 07/2012   • MASTECTOMY MODIFIED RADICAL W/ AXILLARY LYMPH NODES W/ OR W/O PECTORALIS MINOR     • PORTACATH PLACEMENT         Family History   Problem Relation Age of Onset   • No Known Problems Other    • Breast cancer Neg Hx    • Ovarian cancer Neg Hx        Social History     Social History   • Marital status: Single     Spouse name: N/A   • Number of children: N/A   • Years of education: N/A     Social History Main Topics   • Smoking status: Former Smoker   • Smokeless tobacco: Never Used   • Alcohol use No   • Drug use: No   • Sexual activity: Defer     Other Topics Concern   • None     Social History Narrative   • None           Objective   Physical Exam   Constitutional: She is oriented to person, place, and time. She appears well-developed and well-nourished. No distress.   HENT:   Head: Normocephalic.   Mouth/Throat: Oropharynx is clear  and moist.   Eyes: Conjunctivae and EOM are normal. Pupils are equal, round, and reactive to light.   Neck: Normal range of motion. Neck supple. No tracheal deviation present.   Cardiovascular: Normal rate and regular rhythm.  Exam reveals no friction rub.    No murmur heard.  Patient is hypertensive     Pulmonary/Chest: Effort normal and breath sounds normal. No respiratory distress. She has no wheezes. She has no rales.   Abdominal: Soft. Bowel sounds are normal. She exhibits no distension. There is no rebound and no guarding.   Musculoskeletal: Normal range of motion. She exhibits no edema (no edema), tenderness or deformity.   Neurological: She is alert and oriented to person, place, and time. She exhibits normal muscle tone. Coordination normal.   Skin: Skin is warm. No rash noted. She is not diaphoretic. No erythema. No pallor.   Psychiatric: She has a normal mood and affect. Her behavior is normal.   Nursing note and vitals reviewed.      Procedures         ED Course  ED Course   Value Comment By Time   Leuk Esterase, UA: (!) Moderate (2+) (Reviewed) Eligio Whyte MD 01/16 1832    Discussed findings with patient and daughter-caregiver.  In light of the patient's elevated readings today, I have invited the patient to resume dual therapy for hypertension starting tomorrow and follow up with her primary care provider this week with a written log of her blood pressure readings.  I also mentioned the presence of a mild urinary tract infection for which we will initiate antibiotics.  Patient affirms that she is asymptomatic for UTI. LIDIA Spence 01/16 2006      Recent Results (from the past 24 hour(s))   Comprehensive Metabolic Panel    Collection Time: 01/16/18  5:19 PM   Result Value Ref Range    Glucose 100 70 - 100 mg/dL    BUN 13 9 - 23 mg/dL    Creatinine 0.80 0.60 - 1.30 mg/dL    Sodium 140 132 - 146 mmol/L    Potassium 3.2 (L) 3.5 - 5.5 mmol/L    Chloride 104 99 - 109 mmol/L    CO2 31.0  20.0 - 31.0 mmol/L    Calcium 9.0 8.7 - 10.4 mg/dL    Total Protein 6.8 5.7 - 8.2 g/dL    Albumin 3.50 3.20 - 4.80 g/dL    ALT (SGPT) 26 7 - 40 U/L    AST (SGOT) 61 (H) 0 - 33 U/L    Alkaline Phosphatase 123 (H) 25 - 100 U/L    Total Bilirubin 0.3 0.3 - 1.2 mg/dL    eGFR  African Amer 84 >60 mL/min/1.73    Globulin 3.3 gm/dL    A/G Ratio 1.1 (L) 1.5 - 2.5 g/dL    BUN/Creatinine Ratio 16.3 7.0 - 25.0    Anion Gap 5.0 3.0 - 11.0 mmol/L   BNP    Collection Time: 01/16/18  5:19 PM   Result Value Ref Range    .0 (H) 0.0 - 100.0 pg/mL   CBC Auto Differential    Collection Time: 01/16/18  5:19 PM   Result Value Ref Range    WBC 5.25 3.50 - 10.80 10*3/mm3    RBC 3.71 (L) 3.89 - 5.14 10*6/mm3    Hemoglobin 10.2 (L) 11.5 - 15.5 g/dL    Hematocrit 33.7 (L) 34.5 - 44.0 %    MCV 90.8 80.0 - 99.0 fL    MCH 27.5 27.0 - 31.0 pg    MCHC 30.3 (L) 32.0 - 36.0 g/dL    RDW 18.7 (H) 11.3 - 14.5 %    RDW-SD 62.9 (H) 37.0 - 54.0 fl    MPV 9.5 6.0 - 12.0 fL    Platelets 236 150 - 450 10*3/mm3    Neutrophil % 66.0 41.0 - 71.0 %    Lymphocyte % 26.3 24.0 - 44.0 %    Monocyte % 6.7 0.0 - 12.0 %    Eosinophil % 0.4 0.0 - 3.0 %    Basophil % 0.2 0.0 - 1.0 %    Immature Grans % 0.4 0.0 - 0.6 %    Neutrophils, Absolute 3.47 1.50 - 8.30 10*3/mm3    Lymphocytes, Absolute 1.38 0.60 - 4.80 10*3/mm3    Monocytes, Absolute 0.35 0.00 - 1.00 10*3/mm3    Eosinophils, Absolute 0.02 0.00 - 0.30 10*3/mm3    Basophils, Absolute 0.01 0.00 - 0.20 10*3/mm3    Immature Grans, Absolute 0.02 0.00 - 0.03 10*3/mm3   POC Troponin, Rapid    Collection Time: 01/16/18  5:22 PM   Result Value Ref Range    Troponin I 0.00 0.00 - 0.07 ng/mL   Urinalysis With / Culture If Indicated - Urine, Clean Catch    Collection Time: 01/16/18  6:07 PM   Result Value Ref Range    Color, UA Yellow Yellow, Straw    Appearance, UA Cloudy (A) Clear    pH, UA 7.0 5.0 - 8.0    Specific Gravity, UA 1.009 1.001 - 1.030    Glucose, UA Negative Negative    Ketones, UA Negative Negative     Bilirubin, UA Negative Negative    Blood, UA Negative Negative    Protein, UA 30 mg/dL (1+) (A) Negative    Leuk Esterase, UA Moderate (2+) (A) Negative    Nitrite, UA Negative Negative    Urobilinogen, UA 1.0 E.U./dL 0.2 - 1.0 E.U./dL   Urinalysis, Microscopic Only - Urine, Clean Catch    Collection Time: 01/16/18  6:07 PM   Result Value Ref Range    RBC, UA 0-2 None Seen, 0-2 /HPF    WBC, UA 6-12 (A) None Seen /HPF    Bacteria, UA Trace None Seen, Trace /HPF    Squamous Epithelial Cells, UA 0-2 None Seen, 0-2 /HPF    Methodology Automated Microscopy      Note: In addition to lab results from this visit, the labs listed above may include labs taken at another facility or during a different encounter within the last 24 hours. Please correlate lab times with ED admission and discharge times for further clarification of the services performed during this visit.    XR Chest 1 View    (Results Pending)     Vitals:    01/16/18 1930 01/16/18 2000 01/16/18 2015 01/16/18 2030   BP: 166/96 164/88 157/81 160/84   BP Location:       Patient Position:       Pulse: 98 100 98 97   Resp:       Temp:       TempSrc:       SpO2: 94% 93% 92% 93%   Weight:       Height:         Medications   sodium chloride 0.9 % flush 10 mL (not administered)   sodium chloride 0.9 % bolus 500 mL (0 mL Intravenous Stopped 1/16/18 1859)   hydrALAZINE (APRESOLINE) injection 10 mg (10 mg Intravenous Given 1/16/18 1730)   hydrALAZINE (APRESOLINE) injection 10 mg (10 mg Intravenous Given 1/16/18 1815)   CloNIDine (CATAPRES) tablet 0.1 mg (0.1 mg Oral Given 1/16/18 1859)   cefdinir (OMNICEF) capsule 300 mg (300 mg Oral Given 1/16/18 2020)   heparin flush (porcine) 100 UNIT/ML injection 300 Units (300 Units Intravenous Given 1/16/18 2035)     ECG/EMG Results (last 24 hours)     Procedure Component Value Units Date/Time    ECG 12 Lead [[id number]] Collected:  01/16/18 1605     Updated:  01/16/18 1812    Narrative:       Test Reason : RIGHT SHOULDER PAIN  Blood  Pressure : **/** mmHG  Vent. Rate : 094 BPM     Atrial Rate : 094 BPM     P-R Int : 132 ms          QRS Dur : 068 ms      QT Int : 394 ms       P-R-T Axes : 061 -08 000 degrees     QTc Int : 492 ms    Sinus rhythm with occasional premature ventricular complexes  Inferior infarct (cited on or before 05-JUL-2017)  Abnormal ECG  When compared with ECG of 05-JUL-2017 07:09,  premature ventricular complexes are now present  Inverted T waves have replaced nonspecific T wave abnormality in Inferior  leads  Confirmed by BARB HERNANDEZ MD ([id number]) on 1/16/2018 6:12:21 PM    Referred By:  EDMD           Confirmed By:BARB HERNANDEZ MD                    [facility]    Final diagnoses:   Essential hypertension   Urinary tract infection without hematuria, site unspecified            Mercedes Cross, APRN  01/16/18 9050

## 2018-01-17 NOTE — DISCHARGE INSTRUCTIONS
RESUME YOUR DUAL-THERAPY BLOOD PRESSURE MEDICATIONS IN THE MORNING, ADDING THE AMLODIPINE ONCE AGAIN. KEEP A WRITTEN LOG OF YOUR BLOOD PRESSURE READINGS AND CONFER WITH YOUR DOCTOR THIS WEEK.  THANK YOU AND BE CAREFUL IN THE WEATHER.          Asymptomatic Bacteriuria, Female  Asymptomatic bacteriuria is the presence of a large number of bacteria in your urine without the usual symptoms of burning or frequent urination. The following conditions increase the risk of asymptomatic bacteriuria:  · Diabetes mellitus.  · Advanced age.  · Pregnancy in the first trimester.  · Kidney stones.  · Kidney transplants.  · Leaky kidney tube valve in young children (reflux).    Treatment for this condition is not needed in most people and can lead to other problems such as too much yeast and growth of resistant bacteria. However, some people, such as pregnant women, do need treatment to prevent kidney infection. Asymptomatic bacteriuria in pregnancy is also associated with fetal growth restriction, premature labor, and  death.  HOME CARE INSTRUCTIONS  Monitor your condition for any changes. The following actions may help to relieve any discomfort you are feeling:  · Drink enough water and fluids to keep your urine clear or pale yellow. Go to the bathroom more often to keep your bladder empty.  · Keep the area around your vagina and rectum clean. Wipe yourself from front to back after urinating.  SEEK IMMEDIATE MEDICAL CARE IF:  · You develop signs of an infection such as:  ¨ Burning with urination.  ¨ Frequency of voiding.  ¨ Back pain.  ¨ Fever.  · You have blood in the urine.  · You develop a fever.  MAKE SURE YOU:  · Understand these instructions.  · Will watch your condition.  · Will get help right away if you are not doing well or get worse.  This information is not intended to replace advice given to you by your health care provider. Make sure you discuss any questions you have with your health care provider.  Document  Released: 12/18/2006 Document Revised: 01/08/2016 Document Reviewed: 06/09/2014  [employer] Interactive Patient Education © 2017 [employer]      Hypertension  Hypertension is another name for high blood pressure. High blood pressure forces your heart to work harder to pump blood. A blood pressure reading has two numbers, which includes a higher number over a lower number (example: 110/72).  Follow these instructions at home:  · Have your blood pressure rechecked by your doctor.  · Only take medicine as told by your doctor. Follow the directions carefully. The medicine does not work as well if you skip doses. Skipping doses also puts you at risk for problems.  · Do not smoke.  · Monitor your blood pressure at home as told by your doctor.  Contact a doctor if:  · You think you are having a reaction to the medicine you are taking.  · You have repeat headaches or feel dizzy.  · You have puffiness (swelling) in your ankles.  · You have trouble with your vision.  Get help right away if:  · You get a very bad headache and are confused.  · You feel weak, numb, or faint.  · You get chest or belly (abdominal) pain.  · You throw up (vomit).  · You cannot breathe very well.  This information is not intended to replace advice given to you by your health care provider. Make sure you discuss any questions you have with your health care provider.  Document Released: 06/05/2009 Document Revised: 05/25/2017 Document Reviewed: 10/10/2014  [employer] Patient Education © 2017 [employer]

## 2018-02-06 NOTE — PROGRESS NOTES
DATE OF VISIT: 2/6/2018    REASON FOR VISIT: Followup for   1. Breast cancer:   a) Initially presenting as stage IIB disease, T2N1M0, estrogen receptor  strongly positive, progesterone receptor intermediate, HER-2/jean marie negative.   b) Status post right mastectomy followed by adjuvant chemotherapy using  Taxotere and Cytoxan 4 cycles.   c) Took Arimidex 1 mg p.o. daily from 01/10/2013 until 04/28/2015.   2. Locally relapsed disease with right chest wall as well as axillary lymph  nodes biopsy proven metastatic disease done 04/17/2015.  3. Enrolled on clinical trial [employer]  randomizing patients for 2nd line  hormone treatment with Aromasin with placebo versus Aromasin with Entinostat.   4. Currently on Aromasin single agent.   5. Completed adjuvant radiation treatment by Dr. Benavides to the right chest  wall as well as right axillary lymph nodes 12/09/2015.   6. Currently metastatic disease with new liver metastases documented on CAT  scan 04/22/2016.   7. Started Faslodex Ibrance 05/02/2016.   8. Switched to Carbo/gemzar weekly secondary to progressive disease from 07/22/2016 till 10/11/2016.  9. Started gemzar single agent weekly 2 weeks on and 1 week off 10/25/2016.  10.  Started weekly Adriamycin single agent on February 14, 2017   11. Changed to Doxil single agent every 4 weeks on May 30, 2017.   12.  Progressive disease documented CAT scan done August 9, 2017  13.  Started Halaven in August 21, 2017  14.  Progressive disease documented CAT scan done January 16, 2018  15.  Treatment changed to Abraxane 3 weeks on one week off, started January 30, 2018    HISTORY OF PRESENT ILLNESS: The patient is a very pleasant 75-year-old female  with past medical history significant for invasive ductal carcinoma of the  right breast diagnosed 06/21/2012. The patient is status post right mastectomy  on 07/13/2012 with lymph node dissection, tumor greatest dimension was 2.5 cm,  2 out of 8 positive axial lymph nodes, clear  surgical margins. The patient was  started on adjuvant chemotherapy using Taxotere and Cytoxan on 10/08/2012. The  patient completed her treatment on 12/10/2012. The patient was started on  adjuvant hormone treatment using Arimidex 1 mg p.o. daily 01/10/2013. The  patient presented with right chest wall mass. Biopsy done on 04/17/2015  revealed relapsed high-grade ductal carcinoma. She had CAT scan of the chest,  abdomen, and pelvis that revealed disease in the right chest wall as well as  right axilla. The patient was enrolled with [employer]CoG  Aromasin with or without  Entinostat on 05/08/2015. The patient had progressive disease documented on  scans done 04/22/2016 with liver metastases. The patient was started on  Faslodex Ibrance on 05/02/2016. Repeat CT scan done 7/22/2016 showed continued progression of disease, and the patient's treatment was changed to carbo/gemzar 2 weeks on, 1 week off. Completed 5 cycles on 10/11/2016. shw started Gemzar single agent 10/25/2016.  Repeated CAT scan done February 3, 2017 showed progressive liver metastases.  Patient was switched to weekly Adriamycin started on February 14, 2017. She had stable disease documented on CT scans done 5/26/2017 and was switched to Doxil given every 4 weeks.  Repeated scan done August 9, 2017 revealed progressive disease.  Patient was switched to Halaven August 21, 2017.  She completed 6 cycles.  Repeated scans done on January 16, 2018 revealed mixed response with increase in size of liver metastases and right axillary lymph nodes.  She was started on Abraxane weekly January 30, 2018.  The patient is here today for scheduled follow-up visit with treatment.     SUBJECTIVE: The patient was tearful today.  She lost her youngest son last week.  She is grieving appropriately.  She denied any suicidal ideation.  She was able tolerate chemotherapy fairly well.  She is having nausea but no vomiting.  She's been having progressive fatigue.  Her pain is  under control.    PAST MEDICAL HISTORY/SOCIAL HISTORY/FAMILY HISTORY: Unchanged from my prior documentation done on 10/2/2012.     Review of Systems   Constitutional: Positive for fatigue. Negative for activity change, chills, fever and unexpected weight change.   HENT: Negative for hearing loss, mouth sores, nosebleeds, sore throat and trouble swallowing.    Eyes: Negative for visual disturbance.   Respiratory: Positive for cough. Negative for chest tightness, shortness of breath and wheezing.    Cardiovascular: Negative for chest pain, palpitations and leg swelling.   Gastrointestinal: Positive for nausea. Negative for abdominal distention, abdominal pain, blood in stool, constipation, diarrhea, rectal pain and vomiting.        Poor appetite   Endocrine: Negative for cold intolerance and heat intolerance.   Genitourinary: Negative for difficulty urinating, dysuria, frequency and urgency.   Musculoskeletal: Positive for arthralgias. Negative for back pain, gait problem and myalgias.   Skin: Negative for rash.   Neurological: Positive for weakness. Negative for dizziness, tremors, syncope, light-headedness, numbness and headaches.   Hematological: Negative for adenopathy. Does not bruise/bleed easily.   Psychiatric/Behavioral: Negative for confusion, sleep disturbance and suicidal ideas. The patient is not nervous/anxious.          Current Outpatient Prescriptions:   •  albuterol (VENTOLIN HFA) 108 (90 BASE) MCG/ACT inhaler, Inhale 2 puffs Every 6 (Six) Hours As Needed for Wheezing or Shortness of Air., Disp: 18 g, Rfl: 5  •  amLODIPine (NORVASC) 10 MG tablet, Take 10 mg by mouth daily., Disp: , Rfl:   •  cefdinir (OMNICEF) 300 MG capsule, Take 1 capsule by mouth Every 14 (Fourteen) Days., Disp: 14 capsule, Rfl: 0  •  ferrous sulfate 325 (65 FE) MG tablet, Take 325 mg by mouth 3 (Three) Times a Day With Meals., Disp: , Rfl:   •  fluticasone (FLONASE) 50 MCG/ACT nasal spray, 1 spray into each nostril Daily., Disp:  15.8 mL, Rfl: 0  •  HYDROcodone-acetaminophen (NORCO) 5-325 MG per tablet, Take 1 tablet by mouth Every 6 (Six) Hours As Needed for Moderate Pain ., Disp: 120 tablet, Rfl: 0  •  ipratropium-albuterol (DUO-NEB) 0.5-2.5 mg/mL nebulizer, inhale contents of 1 vial every 8 hours in nebulizer, Disp: , Rfl: 0  •  lidocaine-prilocaine (EMLA) 2.5-2.5 % cream, Apply  topically As Needed for mild pain (1-3) (prior to port access). (Patient taking differently: Apply 1 application topically As Needed for Mild Pain (1-3) (prior to port access).), Disp: 30 g, Rfl: 2  •  megestrol (MEGACE) 40 MG/ML suspension, Take 20 mL by mouth Daily., Disp: 480 mL, Rfl: 5  •  Multiple Vitamin (TAB-A-TESS) tablet, Take 1 tablet by mouth daily., Disp: , Rfl: 0  •  ondansetron (ZOFRAN) 8 MG tablet, Take 1 tablet by mouth 3 (Three) Times a Day As Needed for Nausea or Vomiting., Disp: 30 tablet, Rfl: 5  •  ondansetron ODT (ZOFRAN-ODT) 4 MG disintegrating tablet, dissolve 1 tablet under the tongue every 12 hours if needed before meals for nausea, Disp: , Rfl: 0  •  polyethylene glycol (MIRALAX) packet, Take 17 g by mouth Daily., Disp: 225 g, Rfl: 1  •  promethazine (PHENERGAN) 25 MG tablet, take 1 tablet by mouth every 12 hours before meals if needed, Disp: , Rfl: 0  •  quinapril (ACCUPRIL) 10 MG tablet, Take 10 mg by mouth Every Night., Disp: , Rfl:   No current facility-administered medications for this visit.     Facility-Administered Medications Ordered in Other Visits:   •  dexamethasone (DECADRON) 12 mg inj, 12 mg, Intravenous, Once, France Chun MD  •  heparin flush (porcine) 100 UNIT/ML injection 500 Units, 500 Units, Intravenous, PRN, France Chun MD  •  PACLitaxel-protein bound (ABRAXANE) chemo injection 125 mg, 75 mg/m2 (Treatment Plan Recorded), Intravenous, Once, France Chun MD  •  sodium chloride 0.9 % flush 10 mL, 10 mL, Intravenous, PRN, France Chun MD    PHYSICAL EXAMINATION:   /74  Pulse 110  Temp 97.2 °F (36.2 °C)  "(Temporal Artery )   Resp 18  Ht 160 cm (62.99\")  Wt 63.1 kg (139 lb 1.6 oz)  SpO2 97%  BMI 24.65 kg/m2   ECOG Performance Status: 1  General Appearance:  alert, cooperative, no apparent distress and appears stated age   Neurologic/Psychiatric: A&O x 3, gait steady, appropriate affect, strength 5/5 in all muscle groups   HEENT:  Normocephalic, without obvious abnormality, mucous membranes moist   Neck: Supple, symmetrical, trachea midline, no adenopathy;  No thyromegaly, masses, or tenderness   Lungs:   Clear to auscultation bilaterally; respirations regular, even, and unlabored bilaterally   Heart:  Regular rate and rhythm, no murmurs appreciated   Abdomen:   Soft, non-tender, non-distended and no organomegaly   Lymph nodes: No cervical, supraclavicular, inguinal or axillary adenopathy noted   Extremities: Normal, atraumatic; no clubbing, cyanosis, or edema    Skin: No rashes, ulcers, or suspicious lesions noted     Infusion on 02/06/2018   Component Date Value Ref Range Status   • WBC 02/06/2018 5.50  3.50 - 10.80 10*3/mm3 Final   • RBC 02/06/2018 3.74* 3.89 - 5.14 10*6/mm3 Final   • Hemoglobin 02/06/2018 10.4* 11.5 - 15.5 g/dL Final   • Hematocrit 02/06/2018 33.4* 34.5 - 44.0 % Final   • RDW 02/06/2018 19.9* 11.3 - 14.5 % Final   • MCV 02/06/2018 89.4  80.0 - 99.0 fL Final   • MCH 02/06/2018 27.8  27.0 - 31.0 pg Final   • MCHC 02/06/2018 31.1* 32.0 - 36.0 g/dL Final   • MPV 02/06/2018 7.5  6.0 - 12.0 fL Final   • Platelets 02/06/2018 359  150 - 450 10*3/mm3 Final   • Neutrophil % 02/06/2018 64.1  41.0 - 71.0 % Final   • Lymphocyte % 02/06/2018 29.4  24.0 - 44.0 % Final   • Monocyte % 02/06/2018 6.5  0.0 - 12.0 % Final   • Neutrophils, Absolute 02/06/2018 3.50  1.50 - 8.30 10*3/mm3 Final   • Lymphocytes, Absolute 02/06/2018 1.60  0.60 - 4.80 10*3/mm3 Final   • Monocytes, Absolute 02/06/2018 0.40  0.00 - 1.00 10*3/mm3 Final   Infusion on 01/30/2018   Component Date Value Ref Range Status   • Glucose 01/30/2018 " 108* 70 - 100 mg/dL Final   • BUN 01/30/2018 16  9 - 23 mg/dL Final   • Creatinine 01/30/2018 0.80  0.60 - 1.30 mg/dL Final   • Sodium 01/30/2018 139  132 - 146 mmol/L Final   • Potassium 01/30/2018 3.5  3.5 - 5.5 mmol/L Final   • Chloride 01/30/2018 105  99 - 109 mmol/L Final   • CO2 01/30/2018 33.0* 20.0 - 31.0 mmol/L Final   • Calcium 01/30/2018 8.7  8.7 - 10.4 mg/dL Final   • Total Protein 01/30/2018 6.6  5.7 - 8.2 g/dL Final   • Albumin 01/30/2018 3.40  3.20 - 4.80 g/dL Final   • ALT (SGPT) 01/30/2018 30  7 - 40 U/L Final   • AST (SGOT) 01/30/2018 71* 0 - 33 U/L Final   • Alkaline Phosphatase 01/30/2018 155* 25 - 100 U/L Final   • Total Bilirubin 01/30/2018 0.3  0.3 - 1.2 mg/dL Final   • eGFR   Amer 01/30/2018 84  >60 mL/min/1.73 Final   • Globulin 01/30/2018 3.2  gm/dL Final   • A/G Ratio 01/30/2018 1.1* 1.5 - 2.5 g/dL Final   • BUN/Creatinine Ratio 01/30/2018 20.0  7.0 - 25.0 Final   • Anion Gap 01/30/2018 1.0* 3.0 - 11.0 mmol/L Final   • WBC 01/30/2018 5.50  3.50 - 10.80 10*3/mm3 Final   • RBC 01/30/2018 3.88* 3.89 - 5.14 10*6/mm3 Final   • Hemoglobin 01/30/2018 10.5* 11.5 - 15.5 g/dL Final   • Hematocrit 01/30/2018 35.3  34.5 - 44.0 % Final   • RDW 01/30/2018 21.1* 11.3 - 14.5 % Final   • MCV 01/30/2018 91.1  80.0 - 99.0 fL Final   • MCH 01/30/2018 27.2  27.0 - 31.0 pg Final   • MCHC 01/30/2018 29.8* 32.0 - 36.0 g/dL Final   • MPV 01/30/2018 7.2  6.0 - 12.0 fL Final   • Platelets 01/30/2018 370  150 - 450 10*3/mm3 Final   • Neutrophil % 01/30/2018 71.0  41.0 - 71.0 % Final   • Lymphocyte % 01/30/2018 27.7  24.0 - 44.0 % Final   • Monocyte % 01/30/2018 1.3  0.0 - 12.0 % Final   • Neutrophils, Absolute 01/30/2018 3.90  1.50 - 8.30 10*3/mm3 Final   • Lymphocytes, Absolute 01/30/2018 1.50  0.60 - 4.80 10*3/mm3 Final   • Monocytes, Absolute 01/30/2018 0.10  0.00 - 1.00 10*3/mm3 Final   • Creatinine 01/30/2018 0.90  0.60 - 1.30 mg/dL Final    Serial Number: [id number]:  [id number]        Ct Chest With  Contrast    Result Date: 1/16/2018  Narrative: EXAMINATION: CT CHEST W CONTRAST-, CT ABDOMEN AND PELVIS W CONTRAST-01/15/2018:  INDICATION: F/U scan; C50.111-Malignant neoplasm of central portion of right female breast.     TECHNIQUE: Spiral acquisition 5 mm post-IV contrast images through the neck and 5 mm post-IV contrast portal venous phase and delayed venous phase images through the abdomen and pelvis.  The radiation dose reduction device was turned on for each scan per the ALARA (As Low as Reasonably Achievable) protocol.  COMPARISON: 10/20/2017 chest, abdomen and pelvis CT scans.  FINDINGS: Previous exam reports indicate widespread liver metastatic disease, but improved from August 2017 exam. Improving left lung mass and left axillary lymph nodes.  CHEST CT SCAN WITH IV CONTRAST:  A small right subcutaneous postop seroma is stable, approximately 3 cm in diameter. Dense cutaneous thickening of the left breast is again noted, as well as nonspecific dense central breast tissue. No discrete focal mass is appreciated. There has been interval enlargement of a single left axillary lymph node, previously approximately 4 mm in diameter and today approximately 13 mm in diameter. No right axillary adenopathy or mediastinal adenopathy is appreciated. There is no pericardial or pleural effusion.  Lung window images show stable mild linear scarring in the lingula, anterior left upper lobe, and minimal subpleural thickening in the anterior right upper lobe. There is no evidence of pulmonary parenchymal mass or other new pulmonary parenchymal disease. The bony structures appear grossly intact.      Impression: 1.  Interval enlargement of a single left axillary lymph node, now approximately 13 mm in diameter. 2.  Stable left breast cutaneous thickening and stable mild bilateral lung scarring. No new or progressive chest disease is seen elsewhere.  ABDOMEN AND PELVIS CT SCAN WITH IV CONTRAST:  Compared to the previous  10/20/2017 exam, there appears to be mixed response to treatment, some lesions improved and some lesions worsening. A fairly discrete segment 7, 2.5 cm lesion on the previous exam now appears to extend in a circumferential and discontinuous pattern over a roughly 4.6 cm area. An irregular segment 4B/5 infiltrating lesion appears improved from 6.7 x 7.0 cm to 5.0 x 3.1 cm. Discrete 3.2 cm segment 6 lesion appears improved to 2.3 cm.  The adrenal glands remain normal in size. No significant abnormalities are seen of the spleen, pancreas, the contracted gallbladder, or adrenal glands. The kidneys appear unremarkable except for multiple small renal cysts. No upper abdominal adenopathy, ascites, or acute inflammatory change is appreciated.  Regarding the lower abdomen and pelvis, bowel loops are normal in caliber and normal in appearance. No peritoneal disease or adenopathy is seen. There is only a trace amount of intrapelvic free fluid.  Bony structures appear grossly intact.  IMPRESSION: 1.  Mixed response by the liver, with most lesions decreasing in size, one right lobe liver lesion significantly increased, since 10/20/2017. 2.  No evidence of metastatic disease or other new disease elsewhere in the abdomen or pelvis.  D:  01/16/2018 E:  01/16/2018    This report was finalized on 1/16/2018 10:17 PM by DR. Cyrus Milan MD.      Ct Abdomen Pelvis With Contrast    Result Date: 1/16/2018  Narrative: EXAMINATION: CT CHEST W CONTRAST-, CT ABDOMEN AND PELVIS W CONTRAST-01/15/2018:  INDICATION: F/U scan; C50.111-Malignant neoplasm of central portion of right female breast.     TECHNIQUE: Spiral acquisition 5 mm post-IV contrast images through the neck and 5 mm post-IV contrast portal venous phase and delayed venous phase images through the abdomen and pelvis.  The radiation dose reduction device was turned on for each scan per the ALARA (As Low as Reasonably Achievable) protocol.  COMPARISON: 10/20/2017 chest, abdomen and  pelvis CT scans.  FINDINGS: Previous exam reports indicate widespread liver metastatic disease, but improved from August 2017 exam. Improving left lung mass and left axillary lymph nodes.  CHEST CT SCAN WITH IV CONTRAST:  A small right subcutaneous postop seroma is stable, approximately 3 cm in diameter. Dense cutaneous thickening of the left breast is again noted, as well as nonspecific dense central breast tissue. No discrete focal mass is appreciated. There has been interval enlargement of a single left axillary lymph node, previously approximately 4 mm in diameter and today approximately 13 mm in diameter. No right axillary adenopathy or mediastinal adenopathy is appreciated. There is no pericardial or pleural effusion.  Lung window images show stable mild linear scarring in the lingula, anterior left upper lobe, and minimal subpleural thickening in the anterior right upper lobe. There is no evidence of pulmonary parenchymal mass or other new pulmonary parenchymal disease. The bony structures appear grossly intact.      Impression: 1.  Interval enlargement of a single left axillary lymph node, now approximately 13 mm in diameter. 2.  Stable left breast cutaneous thickening and stable mild bilateral lung scarring. No new or progressive chest disease is seen elsewhere.  ABDOMEN AND PELVIS CT SCAN WITH IV CONTRAST:  Compared to the previous 10/20/2017 exam, there appears to be mixed response to treatment, some lesions improved and some lesions worsening. A fairly discrete segment 7, 2.5 cm lesion on the previous exam now appears to extend in a circumferential and discontinuous pattern over a roughly 4.6 cm area. An irregular segment 4B/5 infiltrating lesion appears improved from 6.7 x 7.0 cm to 5.0 x 3.1 cm. Discrete 3.2 cm segment 6 lesion appears improved to 2.3 cm.  The adrenal glands remain normal in size. No significant abnormalities are seen of the spleen, pancreas, the contracted gallbladder, or adrenal  glands. The kidneys appear unremarkable except for multiple small renal cysts. No upper abdominal adenopathy, ascites, or acute inflammatory change is appreciated.  Regarding the lower abdomen and pelvis, bowel loops are normal in caliber and normal in appearance. No peritoneal disease or adenopathy is seen. There is only a trace amount of intrapelvic free fluid.  Bony structures appear grossly intact.  IMPRESSION: 1.  Mixed response by the liver, with most lesions decreasing in size, one right lobe liver lesion significantly increased, since 10/20/2017. 2.  No evidence of metastatic disease or other new disease elsewhere in the abdomen or pelvis.  D:  01/16/2018 E:  01/16/2018    This report was finalized on 1/16/2018 10:17 PM by DR. Cyrus Milan MD.      Xr Chest 1 View    Result Date: 1/17/2018  Narrative: EXAMINATION: XR CHEST 1 VW- 01/16/2018  INDICATION: hypertension  COMPARISON: NONE  FINDINGS: 1. PowerPort is in place from the left with tip well positioned in the mid SVC. 2. There is borderline cardiomegaly. There is mild central granulomatous scarring. There is mild obstructive lung disease. 3. There is no active disease, edema or free fluid otherwise.      Impression: 1. Mild chronic scarring in the chest diffusely. 2. No active disease, edema or free fluid.  D:  01/16/2018 E:  01/17/2018  This report was finalized on 1/17/2018 9:56 AM by Dr. Sergio South MD.        ASSESSMENT: The patient is a very pleasant 75-year-old female with right breast  cancer.     PROBLEM LIST:  1. T2N1M0 invasive ductal carcinoma of the right breast, tumor greatest  dimension 2.5 cm, estrogen receptor strongly positive, progesterone receptor  intermediate, HER-2/jean marie negative by IHC score of 0, 2 out of 8 positive  axillary lymph nodes.   2. Status post right mastectomy with axillary lymph node dissection done by  Dr. Heart on 07/30/2012.   3. Status post 4 cycles of adjuvant chemotherapy using Taxotere and Cytoxan  from  10/08/2012 until 12/10/2012.   4. Took Arimidex 1 mg p.o. daily from 01/10/2013 until 04/28/2015.   5. Relapsed disease documented on CAT scan of the chest, abdomen, and pelvis  done 04/27/2015. Revealed right chest wall mass as well as right axillary  lymphadenopathy. Biopsy done 04/17/2015 revealed invasive ductal carcinoma.   6. Enrolled on ECoG  protocol, Aromasin with placebo versus Aromasin and  Entinostat, 05/08/2015.   7. Stopped Aromasin 04/25/2016 secondary to new liver metastases documented on  CAT scan 04/22/2016.   8. Completed adjuvant radiation treatment to the right chest wall 12/09/2015.  9. Status post right axillary dissection done by Dr. Heart 03/14/2016.   10. Progressive disease documented on CAT scan done 04/22/2016 with new liver  metastases.   11. Started Faslodex Ibrance 05/02/2016.  12.  Worsening metastatic disease documented on CT scans done on 07/25/2016.  13. Started on carboplatin AUC 2 with gemzar given two weeks on, one week off. She is status post three cycle of treatment.   14. Mixed response to treatment documented on CAT scans done 11/15/2016.  15. On gemzar single agent, status post 6 cycles.  16.  Progressive disease documented on CAT scan done February 4, 2017.  17.  Started weekly Adriamycin February 14, 2017.   18. Changed to maintenance Doxil after maximum response from weekly Adriamycin on 5/30/2017.   19. Progressive disease documented on CAT scan done on August 9, 2017  20. Started Halaven August 21, 2017, status post 6 cycles  21.  Progressive disease documented on CAT scan done on January 16, 2018  22.  Treatment was switched to Abraxane weekly that we'll start January 30, 2018  23.  Rheumatoid arthritis  24.  Treatment induced pancytopenia  25.  Chemotherapy-induced nausea    PLAN:  1.  I would proceed with treatment as scheduled today using Abraxane weekly, we'll do 25% dose reduction secondary to pancytopenia.  2. She will continue use of EMLA cream prior to  port access.  3. The patient will continue to hold her methotrexate as she is on active cancer treatment.  4. We will continue to monitor the patient's blood counts, kidney function, and liver function throughout treatment.   5. We will continue Norco 5/325 mg 1 tablet taken every 6 hours as needed for cancer-related pain.    6. The patient will need repeat CAT scan after cycle # 3.   7.  We reviewed the side effects of this regimen including nausea, fatigue, myelosuppression,  peripheral neuropathy, hepatotoxicity, myelodysplasia, alopecia, constipation or diarrhea, and potential death.  8.  The patient will follow up with me in 3 weeks for n cycle 2 day 1.   9.  We'll transfuse for hemoglobin less than 8.  10.  I will continue Zofran as needed for chemotherapy-induced nausea.    France Chun MD,     2/6/2018  2:22 PM

## 2018-02-27 NOTE — PROGRESS NOTES
DATE OF VISIT: 2/27/2018    REASON FOR VISIT: Followup for   1. Breast cancer:   a) Initially presenting as stage IIB disease, T2N1M0, estrogen receptor  strongly positive, progesterone receptor intermediate, HER-2/jean marie negative.   b) Status post right mastectomy followed by adjuvant chemotherapy using  Taxotere and Cytoxan 4 cycles.   c) Took Arimidex 1 mg p.o. daily from 01/10/2013 until 04/28/2015.   2. Locally relapsed disease with right chest wall as well as axillary lymph  nodes biopsy proven metastatic disease done 04/17/2015.  3. Enrolled on clinical trial [employer]  randomizing patients for 2nd line  hormone treatment with Aromasin with placebo versus Aromasin with Entinostat.   4. Currently on Aromasin single agent.   5. Completed adjuvant radiation treatment by Dr. Benavides to the right chest  wall as well as right axillary lymph nodes 12/09/2015.   6. Currently metastatic disease with new liver metastases documented on CAT  scan 04/22/2016.   7. Started Faslodex Ibrance 05/02/2016.   8. Switched to Carbo/gemzar weekly secondary to progressive disease from 07/22/2016 till 10/11/2016.  9. Started gemzar single agent weekly 2 weeks on and 1 week off 10/25/2016.  10.  Started weekly Adriamycin single agent on February 14, 2017   11. Changed to Doxil single agent every 4 weeks on May 30, 2017.   12.  Progressive disease documented CAT scan done August 9, 2017  13.  Started Halaven in August 21, 2017  14.  Progressive disease documented CAT scan done January 16, 2018  15.  Treatment changed to Abraxane 3 weeks on one week off, started January 30, 2018    HISTORY OF PRESENT ILLNESS: The patient is a very pleasant 75-year-old female  with past medical history significant for invasive ductal carcinoma of the  right breast diagnosed 06/21/2012. The patient is status post right mastectomy  on 07/13/2012 with lymph node dissection, tumor greatest dimension was 2.5 cm,  2 out of 8 positive axial lymph nodes, clear  surgical margins. The patient was  started on adjuvant chemotherapy using Taxotere and Cytoxan on 10/08/2012. The  patient completed her treatment on 12/10/2012. The patient was started on  adjuvant hormone treatment using Arimidex 1 mg p.o. daily 01/10/2013. The  patient presented with right chest wall mass. Biopsy done on 04/17/2015  revealed relapsed high-grade ductal carcinoma. She had CAT scan of the chest,  abdomen, and pelvis that revealed disease in the right chest wall as well as  right axilla. The patient was enrolled with [employer]  Aromasin with or without  Entinostat on 05/08/2015. The patient had progressive disease documented on  scans done 04/22/2016 with liver metastases. The patient was started on  Faslodex Ibrance on 05/02/2016. Repeat CT scan done 7/22/2016 showed continued progression of disease, and the patient's treatment was changed to carbo/gemzar 2 weeks on, 1 week off. Completed 5 cycles on 10/11/2016. shw started Gemzar single agent 10/25/2016.  Repeated CAT scan done February 3, 2017 showed progressive liver metastases.  Patient was switched to weekly Adriamycin started on February 14, 2017. She had stable disease documented on CT scans done 5/26/2017 and was switched to Doxil given every 4 weeks.  Repeated scan done August 9, 2017 revealed progressive disease.  Patient was switched to Halaven August 21, 2017.  She completed 6 cycles.  Repeated scans done on January 16, 2018 revealed mixed response with increase in size of liver metastases and right axillary lymph nodes.  She was started on Abraxane weekly January 30, 2018.  The patient is here today for scheduled follow-up visit with treatment.     SUBJECTIVE: The patient is still tearful today.  She is still grieving for her son.  She denied any suicidal ideation.  She was able tolerate chemotherapy fairly well. She is having nausea but no vomiting.  She's been having progressive fatigue.  Her pain is under control.    PAST MEDICAL  HISTORY/SOCIAL HISTORY/FAMILY HISTORY: Unchanged from my prior documentation done on 10/2/2012.     Review of Systems   Constitutional: Positive for fatigue. Negative for activity change, chills, fever and unexpected weight change.   HENT: Negative for hearing loss, mouth sores, nosebleeds, sore throat and trouble swallowing.    Eyes: Negative for visual disturbance.   Respiratory: Positive for cough. Negative for chest tightness, shortness of breath and wheezing.    Cardiovascular: Negative for chest pain, palpitations and leg swelling.   Gastrointestinal: Positive for nausea. Negative for abdominal distention, abdominal pain, blood in stool, constipation, diarrhea, rectal pain and vomiting.        Poor appetite   Endocrine: Negative for cold intolerance and heat intolerance.   Genitourinary: Negative for difficulty urinating, dysuria, frequency and urgency.   Musculoskeletal: Positive for arthralgias. Negative for back pain, gait problem and myalgias.   Skin: Negative for rash.   Neurological: Positive for weakness. Negative for dizziness, tremors, syncope, light-headedness, numbness and headaches.   Hematological: Negative for adenopathy. Does not bruise/bleed easily.   Psychiatric/Behavioral: Negative for confusion, sleep disturbance and suicidal ideas. The patient is not nervous/anxious.          Current Outpatient Prescriptions:   •  albuterol (VENTOLIN HFA) 108 (90 BASE) MCG/ACT inhaler, Inhale 2 puffs Every 6 (Six) Hours As Needed for Wheezing or Shortness of Air., Disp: 18 g, Rfl: 5  •  amLODIPine (NORVASC) 10 MG tablet, Take 10 mg by mouth daily., Disp: , Rfl:   •  ferrous sulfate 325 (65 FE) MG tablet, Take 325 mg by mouth 3 (Three) Times a Day With Meals., Disp: , Rfl:   •  fluticasone (FLONASE) 50 MCG/ACT nasal spray, 1 spray into each nostril Daily., Disp: 15.8 mL, Rfl: 0  •  HYDROcodone-acetaminophen (NORCO) 5-325 MG per tablet, Take 1 tablet by mouth Every 6 (Six) Hours As Needed for Moderate Pain .,  Disp: 120 tablet, Rfl: 0  •  ipratropium-albuterol (DUO-NEB) 0.5-2.5 mg/mL nebulizer, inhale contents of 1 vial every 8 hours in nebulizer, Disp: , Rfl: 0  •  lidocaine-prilocaine (EMLA) 2.5-2.5 % cream, Apply  topically As Needed for mild pain (1-3) (prior to port access). (Patient taking differently: Apply 1 application topically As Needed for Mild Pain (1-3) (prior to port access).), Disp: 30 g, Rfl: 2  •  Multiple Vitamin (TAB-A-TESS) tablet, Take 1 tablet by mouth daily., Disp: , Rfl: 0  •  quinapril (ACCUPRIL) 10 MG tablet, Take 10 mg by mouth Every Night., Disp: , Rfl:     PHYSICAL EXAMINATION:   There were no vitals taken for this visit.   ECOG Performance Status: 1  General Appearance:  alert, cooperative, no apparent distress and appears stated age   Neurologic/Psychiatric: A&O x 3, gait steady, appropriate affect, strength 5/5 in all muscle groups   HEENT:  Normocephalic, without obvious abnormality, mucous membranes moist   Neck: Supple, symmetrical, trachea midline, no adenopathy;  No thyromegaly, masses, or tenderness   Lungs:   Clear to auscultation bilaterally; respirations regular, even, and unlabored bilaterally   Heart:  Regular rate and rhythm, no murmurs appreciated   Abdomen:   Soft, non-tender, non-distended and no organomegaly   Lymph nodes: No cervical, supraclavicular, inguinal or axillary adenopathy noted   Extremities: Normal, atraumatic; no clubbing, cyanosis, or edema    Skin: No rashes, ulcers, or suspicious lesions noted     No visits with results within 2 Week(s) from this visit.  Latest known visit with results is:    Infusion on 02/13/2018   Component Date Value Ref Range Status   • WBC 02/13/2018 4.90  3.50 - 10.80 10*3/mm3 Final   • RBC 02/13/2018 3.73* 3.89 - 5.14 10*6/mm3 Final   • Hemoglobin 02/13/2018 10.3* 11.5 - 15.5 g/dL Final   • Hematocrit 02/13/2018 33.7* 34.5 - 44.0 % Final   • RDW 02/13/2018 20.9* 11.3 - 14.5 % Final   • MCV 02/13/2018 90.2  80.0 - 99.0 fL Final   •  MCH 02/13/2018 27.5  27.0 - 31.0 pg Final   • MCHC 02/13/2018 30.5* 32.0 - 36.0 g/dL Final   • MPV 02/13/2018 7.5  6.0 - 12.0 fL Final   • Platelets 02/13/2018 328  150 - 450 10*3/mm3 Final   • Neutrophil % 02/13/2018 66.0  41.0 - 71.0 % Final   • Lymphocyte % 02/13/2018 29.3  24.0 - 44.0 % Final   • Monocyte % 02/13/2018 4.7  0.0 - 12.0 % Final   • Neutrophils, Absolute 02/13/2018 3.20  1.50 - 8.30 10*3/mm3 Final   • Lymphocytes, Absolute 02/13/2018 1.40  0.60 - 4.80 10*3/mm3 Final   • Monocytes, Absolute 02/13/2018 0.20  0.00 - 1.00 10*3/mm3 Final        No results found.    ASSESSMENT: The patient is a very pleasant 75-year-old female with right breast  cancer.     PROBLEM LIST:  1. T2N1M0 invasive ductal carcinoma of the right breast, tumor greatest  dimension 2.5 cm, estrogen receptor strongly positive, progesterone receptor  intermediate, HER-2/jean marie negative by IHC score of 0, 2 out of 8 positive  axillary lymph nodes.   2. Status post right mastectomy with axillary lymph node dissection done by  Dr. Heart on 07/30/2012.   3. Status post 4 cycles of adjuvant chemotherapy using Taxotere and Cytoxan  from 10/08/2012 until 12/10/2012.   4. Took Arimidex 1 mg p.o. daily from 01/10/2013 until 04/28/2015.   5. Relapsed disease documented on CAT scan of the chest, abdomen, and pelvis  done 04/27/2015. Revealed right chest wall mass as well as right axillary  lymphadenopathy. Biopsy done 04/17/2015 revealed invasive ductal carcinoma.   6. Enrolled on [employer]  protocol, Aromasin with placebo versus Aromasin and  Entinostat, 05/08/2015.   7. Stopped Aromasin 04/25/2016 secondary to new liver metastases documented on  CAT scan 04/22/2016.   8. Completed adjuvant radiation treatment to the right chest wall 12/09/2015.  9. Status post right axillary dissection done by Dr. Heart 03/14/2016.   10. Progressive disease documented on CAT scan done 04/22/2016 with new liver  metastases.   11. Started Faslodex Ibrance  05/02/2016.  12.  Worsening metastatic disease documented on CT scans done on 07/25/2016.  13. Started on carboplatin AUC 2 with gemzar given two weeks on, one week off. She is status post three cycle of treatment.   14. Mixed response to treatment documented on CAT scans done 11/15/2016.  15. On gemzar single agent, status post 6 cycles.  16.  Progressive disease documented on CAT scan done February 4, 2017.  17.  Started weekly Adriamycin February 14, 2017.   18. Changed to maintenance Doxil after maximum response from weekly Adriamycin on 5/30/2017.   19. Progressive disease documented on CAT scan done on August 9, 2017  20. Started Halaven August 21, 2017, status post 6 cycles  21.  Progressive disease documented on CAT scan done on January 16, 2018  22.  Treatment was switched to Abraxane weekly that we'll start January 30, 2018  23.  Rheumatoid arthritis  24.  Treatment induced pancytopenia  25.  Chemotherapy-induced nausea    PLAN:  1.  I will proceed with treatment as scheduled today using Abraxane weekly, we'll continue 25% dose reduction secondary to pancytopenia.  2. She will continue use of EMLA cream prior to port access.  3. The patient will continue to hold her methotrexate as she is on active cancer treatment.  4. We will continue to monitor the patient's blood counts, kidney function, and liver function throughout treatment.   5. We will continue Norco 5/325 mg 1 tablet taken every 6 hours as needed for cancer-related pain.    6. The patient will need repeat CAT scan after cycle # 3.  This would be ordered prior to return  7.  We reviewed the side effects of this regimen including nausea, fatigue, myelosuppression,  peripheral neuropathy, hepatotoxicity, myelodysplasia, alopecia, constipation or diarrhea, and potential death.  8.  The patient will follow up with me in 3 weeks for cycle 3 day 1.   9.  We'll transfuse for hemoglobin less than 8.  10.  I will continue Zofran as needed for  chemotherapy-induced nausea.    France Chun MD,     2/27/2018  1:42 PM

## 2018-03-27 NOTE — PROGRESS NOTES
DATE OF VISIT: 3/27/2018    REASON FOR VISIT: Followup for   1. Breast cancer:   a) Initially presenting as stage IIB disease, T2N1M0, estrogen receptor  strongly positive, progesterone receptor intermediate, HER-2/jean marie negative.   b) Status post right mastectomy followed by adjuvant chemotherapy using  Taxotere and Cytoxan 4 cycles.   c) Took Arimidex 1 mg p.o. daily from 01/10/2013 until 04/28/2015.   2. Locally relapsed disease with right chest wall as well as axillary lymph  nodes biopsy proven metastatic disease done 04/17/2015.  3. Enrolled on clinical trial [employer]  randomizing patients for 2nd line  hormone treatment with Aromasin with placebo versus Aromasin with Entinostat.   4. Currently on Aromasin single agent.   5. Completed adjuvant radiation treatment by Dr. Benavides to the right chest  wall as well as right axillary lymph nodes 12/09/2015.   6. Currently metastatic disease with new liver metastases documented on CAT  scan 04/22/2016.   7. Started Faslodex Ibrance 05/02/2016.   8. Switched to Carbo/gemzar weekly secondary to progressive disease from 07/22/2016 till 10/11/2016.  9. Started gemzar single agent weekly 2 weeks on and 1 week off 10/25/2016.  10.  Started weekly Adriamycin single agent on February 14, 2017   11. Changed to Doxil single agent every 4 weeks on May 30, 2017.   12.  Progressive disease documented CAT scan done August 9, 2017  13.  Started Halaven in August 21, 2017  14.  Progressive disease documented CAT scan done January 16, 2018  15.  Treatment changed to Abraxane 3 weeks on one week off, started January 30, 2018  16.  Progressive disease documented CAT scan done on March 27, 2018    HISTORY OF PRESENT ILLNESS: The patient is a very pleasant 75-year-old female  with past medical history significant for invasive ductal carcinoma of the  right breast diagnosed 06/21/2012. The patient is status post right mastectomy  on 07/13/2012 with lymph node dissection, tumor greatest  dimension was 2.5 cm,  2 out of 8 positive axial lymph nodes, clear surgical margins. The patient was  started on adjuvant chemotherapy using Taxotere and Cytoxan on 10/08/2012. The  patient completed her treatment on 12/10/2012. The patient was started on  adjuvant hormone treatment using Arimidex 1 mg p.o. daily 01/10/2013. The  patient presented with right chest wall mass. Biopsy done on 04/17/2015  revealed relapsed high-grade ductal carcinoma. She had CAT scan of the chest,  abdomen, and pelvis that revealed disease in the right chest wall as well as  right axilla. The patient was enrolled with ECoG  Aromasin with or without  Entinostat on 05/08/2015. The patient had progressive disease documented on  scans done 04/22/2016 with liver metastases. The patient was started on  Faslodex Ibrance on 05/02/2016. Repeat CT scan done 7/22/2016 showed continued progression of disease, and the patient's treatment was changed to carbo/gemzar 2 weeks on, 1 week off. Completed 5 cycles on 10/11/2016. shw started Gemzar single agent 10/25/2016.  Repeated CAT scan done February 3, 2017 showed progressive liver metastases.  Patient was switched to weekly Adriamycin started on February 14, 2017. She had stable disease documented on CT scans done 5/26/2017 and was switched to Doxil given every 4 weeks.  Repeated scan done August 9, 2017 revealed progressive disease.  Patient was switched to Halaven August 21, 2017.  She completed 6 cycles.  Repeated scans done on January 16, 2018 revealed mixed response with increase in size of liver metastases and right axillary lymph nodes.  She was started on Abraxane weekly January 30, 2018.  Repeated CT scan done on March 27, 2018 revealed progressive disease.  The patient is here today for scheduled follow-up visit.     SUBJECTIVE: The patient is complaining of poor appetite.  She is having fatigue.  She denied any nausea or vomiting.  She is having mild right upper quadrant abdominal  pain.    PAST MEDICAL HISTORY/SOCIAL HISTORY/FAMILY HISTORY: Unchanged from my prior documentation done on 10/2/2012.     Review of Systems   Constitutional: Positive for fatigue. Negative for activity change, chills, fever and unexpected weight change.   HENT: Negative for hearing loss, mouth sores, nosebleeds, sore throat and trouble swallowing.    Eyes: Negative for visual disturbance.   Respiratory: Positive for cough. Negative for chest tightness, shortness of breath and wheezing.    Cardiovascular: Negative for chest pain, palpitations and leg swelling.   Gastrointestinal: Positive for abdominal pain and nausea. Negative for abdominal distention, blood in stool, constipation, diarrhea, rectal pain and vomiting.        Poor appetite   Endocrine: Negative for cold intolerance and heat intolerance.   Genitourinary: Negative for difficulty urinating, dysuria, frequency and urgency.   Musculoskeletal: Positive for arthralgias. Negative for back pain, gait problem and myalgias.   Skin: Negative for rash.   Neurological: Positive for weakness. Negative for dizziness, tremors, syncope, light-headedness, numbness and headaches.   Hematological: Negative for adenopathy. Does not bruise/bleed easily.   Psychiatric/Behavioral: Negative for confusion, sleep disturbance and suicidal ideas. The patient is not nervous/anxious.          Current Outpatient Prescriptions:   •  albuterol (VENTOLIN HFA) 108 (90 BASE) MCG/ACT inhaler, Inhale 2 puffs Every 6 (Six) Hours As Needed for Wheezing or Shortness of Air., Disp: 18 g, Rfl: 5  •  amLODIPine (NORVASC) 10 MG tablet, Take 10 mg by mouth daily., Disp: , Rfl:   •  ferrous sulfate 325 (65 FE) MG tablet, Take 325 mg by mouth 3 (Three) Times a Day With Meals., Disp: , Rfl:   •  fluticasone (FLONASE) 50 MCG/ACT nasal spray, 1 spray into each nostril Daily., Disp: 15.8 mL, Rfl: 0  •  HYDROcodone-acetaminophen (NORCO) 5-325 MG per tablet, Take 1 tablet by mouth Every 6 (Six) Hours As  "Needed for Moderate Pain ., Disp: 120 tablet, Rfl: 0  •  ipratropium-albuterol (DUO-NEB) 0.5-2.5 mg/mL nebulizer, inhale contents of 1 vial every 8 hours in nebulizer, Disp: , Rfl: 0  •  lidocaine-prilocaine (EMLA) 2.5-2.5 % cream, Apply  topically As Needed for mild pain (1-3) (prior to port access). (Patient taking differently: Apply 1 application topically As Needed for Mild Pain (1-3) (prior to port access).), Disp: 30 g, Rfl: 2  •  Multiple Vitamin (TAB-A-TESS) tablet, Take 1 tablet by mouth daily., Disp: , Rfl: 0  •  quinapril (ACCUPRIL) 10 MG tablet, Take 10 mg by mouth Every Night., Disp: , Rfl:   No current facility-administered medications for this visit.     PHYSICAL EXAMINATION:   /65   Pulse 107   Temp 98.1 °F (36.7 °C)   Resp 20   Ht 160 cm (63\")   Wt 63.5 kg (140 lb)   BMI 24.80 kg/m²    ECOG Performance Status: 1  General Appearance:  alert, cooperative, no apparent distress and appears stated age   Neurologic/Psychiatric: A&O x 3, gait steady, appropriate affect, strength 5/5 in all muscle groups   HEENT:  Normocephalic, without obvious abnormality, mucous membranes moist   Neck: Supple, symmetrical, trachea midline, no adenopathy;  No thyromegaly, masses, or tenderness   Lungs:   Clear to auscultation bilaterally; respirations regular, even, and unlabored bilaterally   Heart:  Regular rate and rhythm, no murmurs appreciated   Abdomen:   Soft, non-tender, non-distended and no organomegaly   Lymph nodes: No cervical, supraclavicular, inguinal or axillary adenopathy noted   Extremities: Normal, atraumatic; no clubbing, cyanosis, or edema    Skin: No rashes, ulcers, or suspicious lesions noted     Hospital Outpatient Visit on 03/27/2018   Component Date Value Ref Range Status   • Glucose 03/27/2018 81  70 - 100 mg/dL Final   • BUN 03/27/2018 24* 9 - 23 mg/dL Final   • Creatinine 03/27/2018 0.80  0.60 - 1.30 mg/dL Final   • Sodium 03/27/2018 139  132 - 146 mmol/L Final   • Potassium " 03/27/2018 4.5  3.5 - 5.5 mmol/L Final   • Chloride 03/27/2018 103  99 - 109 mmol/L Final   • CO2 03/27/2018 28.0  20.0 - 31.0 mmol/L Final   • Calcium 03/27/2018 8.6* 8.7 - 10.4 mg/dL Final   • Total Protein 03/27/2018 6.3  5.7 - 8.2 g/dL Final   • Albumin 03/27/2018 3.30  3.20 - 4.80 g/dL Final   • ALT (SGPT) 03/27/2018 84* 7 - 40 U/L Final   • AST (SGOT) 03/27/2018 188* 0 - 33 U/L Final   • Alkaline Phosphatase 03/27/2018 362* 25 - 100 U/L Final   • Total Bilirubin 03/27/2018 1.0  0.3 - 1.2 mg/dL Final   • eGFR   Amer 03/27/2018 84  >60 mL/min/1.73 Final   • Globulin 03/27/2018 3.0  gm/dL Final   • A/G Ratio 03/27/2018 1.1* 1.5 - 2.5 g/dL Final   • BUN/Creatinine Ratio 03/27/2018 30.0* 7.0 - 25.0 Final   • Anion Gap 03/27/2018 8.0  3.0 - 11.0 mmol/L Final      Ct Chest With Contrast    Result Date: 3/27/2018  Narrative: EXAMINATION: CT CHEST W CONTRAST-03/27/2018:  INDICATION: F/U scan; C50.111-Malignant neoplasm of central portion of right female breast; Z17.1-Estrogen receptor negative status (ER-), followup and restaging malignancy.  TECHNIQUE:  CT data set of the chest and mediastinum was performed with intravenous contrast enhancement.  The radiation dose reduction device was turned on for each scan per the ALARA (As Low as Reasonably Achievable) protocol.  COMPARISON: Comparison to previous studies of only 2 months ago, 01/15/2018.  FINDINGS: 1. The left axillary lymph nodes are slightly larger when compared to previous studies but only by a few millimeters. There is a stable seroma along the right lateral axillary chest wall. 2. The dominant finding is a marked progression of paratracheal, mid mediastinal, azygos recess and subcarinal bulky adenopathy which appears to be partially necrotic and these large bulky lymph nodes and jimena masses in the mediastinum have dramatically increased and some are even new over the interval of only 2 months. These are ominous findings. 3. There is marked thickening  of the skin of the left breast with an increased glandular pattern but this is stable. Cardiac chambers and pericardium are within normal limits. There is no pericardial effusion. 4. There is obstructive lung disease with mild perimeter fibrosis. There is minimal stranding and linear atelectasis in the anterior segment of the left upper lobe. Evidence of a dominant pulmonary nodule, mass, consolidation, or effusion is not identified. 5. Lastly, lytic or blastic disease in the chest is not appreciated.      Impression: 1.  Over the interval of only 2 months since previous examinations, high grade bulky mediastinal mass lesions are identified suggesting coalescent malignant bulky adenopathy throughout the mediastinum including right paratracheal area, subcarinal region and azygos recess. Some of the lymph nodes may even be centrally necrotic. These are all new and represent rapid progression of mediastinal malignancy. 2.  There is minimal atelectasis in the anterior segment left upper lobe otherwise, there is no evidence of a dominant pulmonary nodule, mass or effusion. 3.  Lytic or blastic bone disease is not identified. The axillary adenopathy is only slightly increased over the interval by only a few millimeters. 4.  Nonetheless, rapid progression of mediastinal malignancy is noted.  D:  03/27/2018 E:  03/27/2018  This report was finalized on 3/27/2018 12:33 PM by Dr. Sergio South MD.      Ct Abdomen Pelvis With Contrast    Result Date: 3/27/2018  Narrative: EXAMINATION: CT ABDOMEN AND PELVIS W CONTRAST-  INDICATION: C50.111-Malignant neoplasm of central portion of right female breast; Z17.1-Estrogen receptor negative status (ER-); staging malignancy.  TECHNIQUE: CT data set of the abdomen and pelvis was performed with intravenous contrast enhancement.  The radiation dose reduction device was turned on for each scan per the ALARA (As Low as Reasonably Achievable) protocol.  COMPARISON: Comparison is made to  previous datasets of nearly 2 1/2 months ago, 01/15/2018.  FINDINGS: 1. The patient has extensive hepatic metastasis involving all segments and all lobes of the liver. The metastatic low-attenuation mass lesions throughout the liver have markedly and diffusely increased in size, number and extent over the two-month interval. In fact, the liver is now significantly enlarged. This is rapid progression of hepatic metastasis. 2. The spleen is unremarkable. Patient has a right adrenal mass. This right adrenal mass is slightly larger than previously noted. Left adrenal gland is normal. 3. The pancreas is unremarkable. Renal cysts are identified without renal mass or obstruction. The retrocrural space is clear. The periaortic retroperitoneum, mesentery and omentum are clear. 4. The patient has mildly prominent distal small bowel with fluid-filled loops. Only a trace amount of free fluid is seen in the lower pelvis. Stranding or caking is not identified.      Impression: 1. Rapid progression of diffuse metastatic disease throughout the liver. 2. Increase in the right adrenal size over the interval. 3. Evidence supports rapid progression of malignancy and metastatic disease.  D:  03/27/2018 E:  03/27/2018  This report was finalized on 3/27/2018 12:34 PM by Dr. Sergio South MD.    (  No results found.    ASSESSMENT: The patient is a very pleasant 75-year-old female with right breast  cancer.     PROBLEM LIST:  1. T2N1M0 invasive ductal carcinoma of the right breast, tumor greatest  dimension 2.5 cm, estrogen receptor strongly positive, progesterone receptor  intermediate, HER-2/jean marie negative by IHC score of 0, 2 out of 8 positive  axillary lymph nodes.   2. Status post right mastectomy with axillary lymph node dissection done by  Dr. Heart on 07/30/2012.   3. Status post 4 cycles of adjuvant chemotherapy using Taxotere and Cytoxan  from 10/08/2012 until 12/10/2012.   4. Took Arimidex 1 mg p.o. daily from 01/10/2013 until  04/28/2015.   5. Relapsed disease documented on CAT scan of the chest, abdomen, and pelvis  done 04/27/2015. Revealed right chest wall mass as well as right axillary  lymphadenopathy. Biopsy done 04/17/2015 revealed invasive ductal carcinoma.   6. Enrolled on ECoG  protocol, Aromasin with placebo versus Aromasin and  Entinostat, 05/08/2015.   7. Stopped Aromasin 04/25/2016 secondary to new liver metastases documented on  CAT scan 04/22/2016.   8. Completed adjuvant radiation treatment to the right chest wall 12/09/2015.  9. Status post right axillary dissection done by Dr. Heart 03/14/2016.   10. Progressive disease documented on CAT scan done 04/22/2016 with new liver  metastases.   11. Started Faslodex Ibrance 05/02/2016.  12.  Worsening metastatic disease documented on CT scans done on 07/25/2016.  13. Started on carboplatin AUC 2 with gemzar given two weeks on, one week off. She is status post three cycle of treatment.   14. Mixed response to treatment documented on CAT scans done 11/15/2016.  15. On gemzar single agent, status post 6 cycles.  16.  Progressive disease documented on CAT scan done February 4, 2017.  17.  Started weekly Adriamycin February 14, 2017.   18. Changed to maintenance Doxil after maximum response from weekly Adriamycin on 5/30/2017.   19. Progressive disease documented on CAT scan done on August 9, 2017  20. Started Halaven August 21, 2017, status post 6 cycles  21.  Progressive disease documented on CAT scan done on January 16, 2018  22.  Treatment was switched to Abraxane weekly that we'll start January 30, 2018  23.  Rheumatoid arthritis  24.  Treatment induced pancytopenia  25.  Chemotherapy-induced nausea    PLAN:  1.  I did go over the CAT scan results with the patient, I reviewed the films myself, I went over the pictures with her.  Unfortunately patient has evidence of progressive disease.  At this time I gave the patient 2 options either continuing active cancer treatment  versus best supportive care.  Patient is interested in continuing cancer treatment.  Given the fact that she had progressed on multiple previous lines of chemotherapy I think it's reasonable to try immune therapy.  Another option would be oral Xeloda.  Patient is interested in immune therapy.  She does have history of present arthritis however her disease has not been active and she's been off treatment for long time.  According to patient she did not require any treatment for more than 5 years ago.  I will start patient on Opdivo for 80 mg every 4 weeks.  2. She will continue to use of EMLA cream prior to port access.  3. The patient will continue to hold her methotrexate since she will be on immunotherapy.  4. We will continue to monitor the patient's blood counts, kidney function, and liver function throughout treatment.   5. We will continue Norco 5/325 mg 1 tablet taken every 6 hours as needed for cancer-related pain.    6. The patient will need repeat CAT scan after cycle # 3.    7.  We reviewed the side effects of this regimen including nausea, fatigue, myelosuppression,  peripheral neuropathy, hepatotoxicity, myelodysplasia, alopecia, constipation or diarrhea, and potential death.  8.  The patient will follow up with me in 5 weeks for cycle 2 day 1.   9.  We'll transfuse for hemoglobin less than 8.  10.  I will continue Zofran as needed for chemotherapy-induced nausea.    France Chun MD,     3/27/2018  1:12 PM

## 2018-03-27 NOTE — PLAN OF CARE
Outpatient Infusion • [address] [facility] • [address] • [place] [id number] • [phone]      CHEMOTHERAPY EDUCATION SHEET    NAME:  Nava Cline      : 1940           DATE: 18    Booklets Given: Chemotherapy and You []  Eating Hints []    Sexuality/Fertility Books []     Chemotherapy/Biotherapy Education Sheets: (list all that apply)  Nivolumab                                                                                                                                                                Chemotherapy Regimen:  Nivolumab every 28 days x12    TOPICS EDUCATION PROVIDED EDUCATION REINFORCED COMMENTS   ANEMIA:  role of RBC, cause, s/s, ways to manage, role of transfusion [x] [] Counseled patient on the method of action of anemia caused by cytotoxic chemotherapy. Counseled patient on the symptoms resulting from anemia. Explained to patient the process of blood tests performed at treatment visits.    THROMBOCYTOPENIA:  role of platelet, cause, s/s, ways to prevent bleeding, things to avoid, when to seek help [x] [] Counseled patient on the mechanism of thrombocytopenia caused by cytotoxic chemotherapy. Counseled patient on the symptoms associated with low platelet counts. Recommended patient to avoid unnecessary activities with increased risk of bleeding. Encouraged patient to call MD with severe bleeding.    NEUTROPENIA:  role of WBC, cause, infection precautions, s/s of infection, when to call MD [] [] Counseled patient on the mechanism of neutropenia resulting from cytotoxic chemotherapy. Counseled patient on the issues that can arise from decreased immune function. Recommended patient to contact MD with a fever >100.4. Encouraged patient to take reasonable precautions to avoid infection.    NUTRITION & APPETITE CHANGES:  importance of maintaining healthy diet & weight, ways to manage to improve intake, dietary consult, exercise regimen [] []    DIARRHEA:  causes, s/s of dehydration,  ways to manage, dietary changes, when to call MD [x] [] Counseled patient on the likelihood of diarrhea associated with this chemotherapy regimen. Encouraged patient to treat mild diarrhea at home and counseled on the dosing of loperamide. Recommended patient to contact MD with severe diarrhea.   CONSTIPATION:  causes, ways to manage, dietary changes, when to call MD [] []    NAUSEA & VOMITING:  cause, use of antiemetics, dietary changes, when to call MD [] [] .    MOUTH SORES:  causes, oral care, ways to manage [] []    ALOPECIA:  cause, ways to manage, resources [] []    INFERTILITY & SEXUALITY:  causes, fertility preservation options, sexuality changes, ways to manage, importance of birth control [] [] Counseled on the need for safe sex practices during treatment.    NERVOUS SYSTEM CHANGES:  causes, s/s, neuropathies, cognitive changes, ways to manage [] []    PAIN:  causes, ways to manage [] [] ????   SKIN & NAIL CHANGES:  cause, s/s, ways to manage [x] [] Counseled patient on the risk of rash and pruritus. Recommended patient contact MD for a rash worse than mild.    ORGAN TOXICITIES:  cause, s/s, need for diagnostic tests, labs, when to notify MD [x] [] Counseled patient on the likelihood of liver, renal, and thyroid toxicity with this chemotherapy regimen. Explained to patient the purpose of blood tests to monitor organ function.     SURVIVORSHIP:  distress, distress assessment, secondary malignancies, early/late effects, follow-up, social issues, social support [] []    HOME CARE:  use of spill kits, storing of PO chemo, how to manage bodily fluids [x] [] Counseled patient on the disposal and cleaning of soiled sheets/toilets. Explained the reasoning in preventing others from being exposed to the chemotherapy agents.    MISCELLANEOUS:  drug interactions, administration, vesicant, et [x] [] Counseled patient on the risk of cough and pneumonitis, fatigue.     Referrals:        Notes: Patient had no additional  questions after counseling. Encouraged patient to reach out to myself or a pharmacist with questions or concerns.     Thanks,  Eric Rodgers, PharmD Candidate  Ext. [phone]

## 2018-04-09 NOTE — TELEPHONE ENCOUNTER
Kate with authorization for chemotherapy medications called back to say that medication from [facility] is still in the pending mode.  Will push out treatment for one week.  Kate is in contact with company and will get back to Dr Lev Nguyen.

## 2018-04-17 PROBLEM — E86.0 DEHYDRATION: Status: ACTIVE | Noted: 2018-01-01

## 2018-04-17 PROBLEM — N17.9 AKI (ACUTE KIDNEY INJURY) (HCC): Status: ACTIVE | Noted: 2018-01-01

## 2018-04-17 PROBLEM — R19.7 DIARRHEA: Status: ACTIVE | Noted: 2018-01-01

## 2018-04-17 PROBLEM — D72.829 LEUKOCYTOSIS: Status: ACTIVE | Noted: 2018-01-01

## 2018-04-17 PROBLEM — I95.9 HYPOTENSION: Status: ACTIVE | Noted: 2018-01-01

## 2018-04-17 PROBLEM — R79.89 ELEVATED LFTS: Status: ACTIVE | Noted: 2018-01-01

## 2018-04-17 NOTE — PROGRESS NOTES
Patient did not receive treatment today.  Was admitted to hospital from [facility] [place] center.

## 2018-04-17 NOTE — PROGRESS NOTES
Received phone call from Donna in infusion regarding pt complaints of nausea, vomiting, weakness, fatigue, and poor appetite with weight loss. Pt is on for Opdivo today. Last treatment was 3/13/2018. Went over to assess patient. She is frail appearing, complaining of nausea with inability to keep any PO intake down. She is tachycardic. Lungs CTAB. Abdomen soft. Skin/MM dry and pale. Pt friend at bedside. Reviewed labs including WBC 14.3, hgb 11.3, Plt 403. CMP pending. Pt receiving IVF for hydration and Zofran for nausea. Discussed symptoms with pt. She is agreeable to hospital admission for hydration, IV antiemetics, and further work up on her worsening symptoms. Discussed with Dr. Chun who agrees. Called Dr. Nice with hospitalist service who is agreeable to accept pt for admission. Pt will go to registration following IVF in outpatient infusion. Dr. Chun will follow along while she is inpatient for further plan of care.

## 2018-04-17 NOTE — H&P
[facility] Medicine Services  HISTORY AND PHYSICAL    Patient Name: Nava Cline  : 1940  MRN: [id number]  Primary Care Physician: Yobani Lima MD    Subjective   Subjective     Chief Complaint:  N/V/D, fatigue    HPI:  Nava Cline is a 77 y.o. female with a PMH Of Arthritis , breast cancer with radiation and chemo, Mastectomy Hypothyroid, HTN, and  Liver metastasis. Patient was a direct admit from Dr. Chun office. Patient arrived at infusion center she was scheduled to start Opdivo today. Patient had bp of 78/51. Patient has been unable to keep any food down for at least a week. She recently started taking a boost supplement a couple days ago. Since she has taken it she has had diarrhea for at least 2-3 days. Yesterday she felt more Nauseated and had vomiting without eating. She has been very fatigued over last week. Patient has been taking her bp medicine daily. She denies any soa, cp, fever, or urinary symptoms. 10lb wt loss over last month or so.     Labs: bp 78/51, WBC 14.30, Creat 1.20, bilirubin 4.4, , . Patient will be admitted to hospital medicine for further treatment and Dr. Chun will see in am.     Review of Systems   Constitutional: Positive for activity change, appetite change and fatigue. Negative for fever.   HENT: Negative for trouble swallowing.    Respiratory: Negative for shortness of breath and wheezing.    Cardiovascular: Negative for chest pain and leg swelling.   Gastrointestinal: Positive for diarrhea, nausea and vomiting. Negative for abdominal pain and constipation.   Genitourinary: Negative for dysuria.   Musculoskeletal: Negative for gait problem.   Neurological: Negative for dizziness.          Otherwise 10-system ROS reviewed and is negative except as mentioned in the HPI.    Personal History     Past Medical History:   Diagnosis Date   • ALYX (acute kidney injury) 2018   • Arthritis    • Breast cancer 2012   • Disease  of thyroid gland    • Drug therapy    • Hx of radiation therapy    • Hypertension    • Liver metastasis 8/17/2016   • Osteoporosis        Past Surgical History:   Procedure Laterality Date   • BREAST BIOPSY     • MASTECTOMY Right 07/2012   • MASTECTOMY MODIFIED RADICAL W/ AXILLARY LYMPH NODES W/ OR W/O PECTORALIS MINOR     • PORTACATH PLACEMENT         Family History: family history includes Cancer in her son; Heart disease in her mother; No Known Problems in her other.     Social History:  reports that she has quit smoking. She has never used smokeless tobacco. She reports that she does not drink alcohol or use drugs.  Social History     Social History Narrative    Lives with grandson. Retired. Uses walker        Medications:  Prescriptions Prior to Admission   Medication Sig Dispense Refill Last Dose   • hydrALAZINE (APRESOLINE) 25 MG tablet Take 25 mg by mouth 2 (Two) Times a Day.      • tiotropium bromide monohydrate (SPIRIVA RESPIMAT) 2.5 MCG/ACT aerosol solution inhaler Inhale 1 capsule Daily.      • albuterol (VENTOLIN HFA) 108 (90 BASE) MCG/ACT inhaler Inhale 2 puffs Every 6 (Six) Hours As Needed for Wheezing or Shortness of Air. 18 g 5 Taking   • amLODIPine (NORVASC) 10 MG tablet Take 10 mg by mouth daily.   Not Taking   • ferrous sulfate 325 (65 FE) MG tablet Take 325 mg by mouth 3 (Three) Times a Day With Meals.      • fluticasone (FLONASE) 50 MCG/ACT nasal spray 1 spray into each nostril Daily. 15.8 mL 0    • HYDROcodone-acetaminophen (NORCO) 5-325 MG per tablet Take 1 tablet by mouth Every 6 (Six) Hours As Needed for Moderate Pain . 120 tablet 0    • ipratropium-albuterol (DUO-NEB) 0.5-2.5 mg/mL nebulizer inhale contents of 1 vial every 8 hours in nebulizer  0    • lidocaine-prilocaine (EMLA) 2.5-2.5 % cream Apply  topically As Needed for mild pain (1-3) (prior to port access). (Patient taking differently: Apply 1 application topically As Needed for Mild Pain (1-3) (prior to port access).) 30 g 2 Not  Taking   • Multiple Vitamin (TAB-A-TESS) tablet Take 1 tablet by mouth daily.  0 Not Taking   • quinapril (ACCUPRIL) 10 MG tablet Take 10 mg by mouth Every Night.   Not Taking       Allergies   Allergen Reactions   • Penicillin V Potassium [Penicillin V] Hives       Objective   Objective     Vital Signs:   Temp:  [97.8 °F (36.6 °C)] 97.8 °F (36.6 °C)  Heart Rate:  [100-117] 100  Resp:  [20] 20  BP: ()/(43-51) 112/43        Physical Exam   Constitutional: No acute distress, awake, alert  Eyes: PERRLA, sclerae anicteric, no conjunctival injection  HENT: NCAT, mucous membranes dry  Neck: Supple,  trachea midline  Respiratory: Clear to auscultation bilaterally, nonlabored respirations   Cardiovascular: RRR, no murmurs, rubs, or gallops, palpable pedal pulses bilaterally  Gastrointestinal: hypoactive  bowel sounds, soft, nontender, nondistended  Musculoskeletal: No bilateral ankle edema, no clubbing or cyanosis to extremities  Psychiatric: Appropriate affect, cooperative  Neurologic: Oriented x 3, strength symmetric in all extremities, Cranial Nerves grossly intact to confrontation, speech clear  Skin: No rashes, left chest Port-a- cath       Results Reviewed:  I have personally reviewed current lab, radiology, and data and agree.      Results from last 7 days  Lab Units 04/17/18  1324   WBC 10*3/mm3 14.30*   HEMOGLOBIN g/dL 11.3*   HEMATOCRIT % 36.3   PLATELETS 10*3/mm3 403       Results from last 7 days  Lab Units 04/17/18  1324   SODIUM mmol/L 135   POTASSIUM mmol/L 4.4   CHLORIDE mmol/L 100   CO2 mmol/L 26.0   BUN mg/dL 32*   CREATININE mg/dL 1.20   GLUCOSE mg/dL 152*   CALCIUM mg/dL 8.3*   ALT (SGPT) U/L 165*   AST (SGOT) U/L 633*     Estimated Creatinine Clearance: 35.4 mL/min (by C-G formula based on SCr of 1.2 mg/dL).  Brief Urine Lab Results  (Last result in the past 365 days)      Color   Clarity   Blood   Leuk Est   Nitrite   Protein   CREAT   Urine HCG        01/16/18 1807 Yellow Cloudy(A) Negative  "Moderate (2+)(A) Negative 30 mg/dL (1+)(A)             No results found for: BNP  No results found for: PHART  Imaging Results (last 24 hours)     ** No results found for the last 24 hours. **        Results for orders placed during the hospital encounter of 08/09/17   Adult Transthoracic Echo Complete    Narrative · Left ventricular systolic function is normal.  · Left ventricular diastolic dysfunction (grade I) consistent with   impaired relaxation.  · Estimated right ventricular systolic pressure from tricuspid   regurgitation is moderately elevated (45-55 mmHg).  · Moderate tricuspid valve regurgitation          Assessment/Plan   Assessment / Plan     Hospital Problem List     Hypotension    ALYX (acute kidney injury)    Dehydration    Leukocytosis            Assessment & Plan:  N/V  -- antiemetics  -- clear liquids and adv as tolerated   -- obtain KUB but doubt obstruction as she is passing gas and no significant distention    Diarrhea  -- check Cdiff if diarrhea continues  -- per patient only started couple days ago once she started taking strawberry boost    Hypotension  -- hold home bp meds  -- got 1L NS at infusion center  -- cont Ivf's    Leukocytosis  -- possible viral etiology vs. Hemoconcentration  -- monitor     Dehydration  -- cont IVF\"s 125 ml   -- creatinine increased to 1.20 baseline 0.80  -- hold home bp meds  -- got 1L NS at infusion center     Metastatic breast cancer  Elevated LFTs  --  and  increase from 3/27, were (84, 188)  -- bilirubin 4.4  -- Ct scan of abd on 3/27 showed extensive hepatic metastasis involving all segments  and all lobes of the liver. The metastatic low-attenuation mass lesions  throughout the liver have markedly and diffusely increased in size,  number and extent over the two-month interval. Liver enlarged   -- Dr. Chun to see tomorrow.  Potentially discuss palliative care or hospice.    DVT prophylaxis: odilon maurer     CODE STATUS:  Full Code    Admission " Status:  I believe this patient meets OBSERVATION status, however if further evaluation or treatment plans warrant, status may change.  Based upon current information, I predict patient's care encounter to be less than or equal to 2 midnights.      Electronically signed by LIDIA May, 04/17/18, 4:04 PM.      Brief Attending Admission Attestation     I have seen and examined the patient, performing an independent face-to-face diagnostic evaluation with plan of care reviewed and developed with the advanced practice clinician (APC).      Brief Summary Statement/HPI:   Nava Cline is a 77 y.o. female with metastatic breast cancer presenting as a direct admit from infusion center due to intractable nausea and vomiting for two days with some loose stools beginning today.  She has not had abdominal pain.  She was noted to be hypotensive in the infusion center.  She is now feeling much better with IV fluids.  She has not had fevers, chills, dysuria, cough or other symptoms of infection.  Her family thinks she is grieving due to the loss of her son in January.      Attending Physical Exam:  Constitutional: No acute distress, awake, alert  Eyes: PERRLA, sclerae anicteric, no conjunctival injection  HENT: NCAT, mucous membranes moist, poor dentition  Neck: Supple, trachea midline  Respiratory: Clear to auscultation bilaterally, nonlabored respirations   Cardiovascular: RRR, no murmurs, rubs, or gallops  Gastrointestinal: Positive bowel sounds, soft, nontender, nondistended  Musculoskeletal: No bilateral ankle edema, no clubbing or cyanosis to extremities  Psychiatric: Appropriate affect, cooperative  Neurologic: Oriented x 3, Cranial Nerves grossly intact to confrontation, speech clear  Skin: No rashes      Brief Assessment/Plan :  See above for further detailed assessment and plan developed with APC which I have reviewed and/or edited.      Electronically signed by Estefania Nice MD, 04/17/18, 10:04 PM.

## 2018-04-17 NOTE — PLAN OF CARE
Problem: Patient Care Overview  Goal: Plan of Care Review  Outcome: Ongoing (interventions implemented as appropriate)   04/17/18 1819   Coping/Psychosocial   Plan of Care Reviewed With patient   OTHER   Outcome Summary pt on ns. No n/V at this time. Resting ronnie. npo.        Problem: Skin Injury Risk (Adult)  Goal: Identify Related Risk Factors and Signs and Symptoms  Outcome: Ongoing (interventions implemented as appropriate)   04/17/18 1819   Skin Injury Risk (Adult)   Related Risk Factors (Skin Injury Risk) advanced age;body weight extremes;critical care admission;cognitive impairment;fluid intake inadequate;mechanical forces;nutritional deficiencies;mobility impaired     Goal: Skin Health and Integrity  Outcome: Ongoing (interventions implemented as appropriate)   04/17/18 1819   Skin Injury Risk (Adult)   Skin Health and Integrity making progress toward outcome

## 2018-04-18 NOTE — PROGRESS NOTES
Discharge Planning Assessment  [facility]     Patient Name: Nava Cline  MRN: [id number]  Today's Date: 4/18/2018    Admit Date: 4/17/2018          Discharge Needs Assessment     Row Name 04/18/18 1016       Living Environment    Lives With child(tonya), adult;grandchild(tonya)   son Alfonso and grandson Gigi    Current Living Arrangements home/apartment/condo   One level historic home, completely renovated. Everything she needs is on one level    Primary Care Provided by self    Provides Primary Care For no one    Family Caregiver if Needed child(tonya), adult   son Alfonso    Quality of Family Relationships supportive;involved;helpful    Able to Return to Prior Arrangements yes       Resource/Environmental Concerns    Resource/Environmental Concerns none    Transportation Concerns car, none       Transition Planning    Patient/Family Anticipates Transition to home with family    Patient/Family Anticipated Services at Transition home health care   May/may not need home health. Has ample family support. Our discussion about this topic was interupted by a phone call       Discharge Needs Assessment    Readmission Within the Last 30 Days no previous admission in last 30 days    Concerns to be Addressed no discharge needs identified    Equipment Currently Used at Home grab bar;shower chair;nebulizer;oxygen through [employer].     Anticipated Changes Related to Illness none    Equipment Needed After Discharge none    Current Discharge Risk chronically ill            Discharge Plan     Row Name 04/18/18 1023       Plan    Plan home    Patient/Family in Agreement with Plan yes    Plan Comments     I spoke with Ms. Cline, a very pleasant single 77 year old lady who has much family support for home. Currently her son and grandson reside with her and provide much of the household chores. Her daughter in [facility] also checks on her. Patient tells me she has always been very independent and is not use to all  this attention.     I will continue to follow for discharge planning needs.             Destination     No service coordination in this encounter.      Durable Medical Equipment     No service coordination in this encounter.      Dialysis/Infusion     No service coordination in this encounter.      Home Medical Care     No service coordination in this encounter.      Social Care     No service coordination in this encounter.        Expected Discharge Date and Time     Expected Discharge Date Expected Discharge Time    Apr 20, 2018               Demographic Summary     Row Name 04/18/18 1013       General Information    Referral Source admission list;physician    Preferred Language English       Contact Information    Permission Granted to Share Info With     Contact Information Obtained for     Contact Information Comments PCP is ALEXY GASTELUM             Functional Status     Row Name 04/18/18 1014       Functional Status    Usual Activity Tolerance good    Current Activity Tolerance good       Functional Status, IADL    Medications independent    Meal Preparation assistive person   Her son has been staying with her to see about her. He normally does the household chores. Patient tells me she has no problem doing if he was not there    Housekeeping assistive person    Laundry assistive person    Shopping assistive person       Employment/    Employment/ Comments Has both [employer] and [employer] coverage.             Psychosocial    No documentation.           Abuse/Neglect    No documentation.           Legal    No documentation.           Substance Abuse    No documentation.           Patient Forms    No documentation.         Irasema Mortensen RN

## 2018-04-18 NOTE — PLAN OF CARE
Problem: Patient Care Overview  Goal: Plan of Care Review  Outcome: Ongoing (interventions implemented as appropriate)   04/18/18 1157   Coping/Psychosocial   Plan of Care Reviewed With patient   OTHER   Outcome Summary no nausea and no vomiting, pt rested well, vss , tolerating PO   Plan of Care Review   Progress improving     Goal: Individualization and Mutuality  Outcome: Ongoing (interventions implemented as appropriate)    Goal: Discharge Needs Assessment  Outcome: Ongoing (interventions implemented as appropriate)    Goal: Interprofessional Rounds/Family Conf  Outcome: Ongoing (interventions implemented as appropriate)      Problem: Skin Injury Risk (Adult)  Goal: Identify Related Risk Factors and Signs and Symptoms  Outcome: Ongoing (interventions implemented as appropriate)    Goal: Skin Health and Integrity  Outcome: Ongoing (interventions implemented as appropriate)

## 2018-04-18 NOTE — CONSULTS
Subjective     CHIEF COMPLAINT: Fatigue and weakness    HISTORY OF PRESENT ILLNESS:  The patient is a 77 y.o. female, referred by Shama Gonzalez MD for metastatic breast cancer.  Patient has failed multiple previous labs of treatment.  She was scheduled to start treatment with Opdivo yesterday.  She went infusion room complaining of fatigue and weakness, her blood pressure was 70/50.  She's been having poor appetite.  Patient was directly admitted hospitalist service and I was consulted to further assist in her care.  When I saw the patient today she's feeling better, she is getting IV fluid.  She is still complaining of fatigue.      REVIEW OF SYSTEMS:  A 14 point review of systems was performed and is negative except as noted above.    Past Medical History:   Diagnosis Date   • ALYX (acute kidney injury) 4/17/2018   • Arthritis    • Breast cancer 07/2012   • Disease of thyroid gland    • Drug therapy    • Hx of radiation therapy    • Hypertension    • Liver metastasis 8/17/2016   • Osteoporosis        Current Facility-Administered Medications on File Prior to Encounter   Medication Dose Route Frequency Provider Last Rate Last Dose   • [COMPLETED] ondansetron (ZOFRAN) injection 16 mg  16 mg Intravenous Once LIDIA Khan   16 mg at 04/17/18 1329   • [COMPLETED] sodium chloride 0.9 % infusion  1,000 mL/hr Intravenous Once LIDIA Khan   Stopped at 04/17/18 1444   • [DISCONTINUED] heparin flush (porcine) 100 UNIT/ML injection 500 Units  500 Units Intravenous PRN France Chun MD   500 Units at 04/17/18 1448     Current Outpatient Prescriptions on File Prior to Encounter   Medication Sig Dispense Refill   • albuterol (VENTOLIN HFA) 108 (90 BASE) MCG/ACT inhaler Inhale 2 puffs Every 6 (Six) Hours As Needed for Wheezing or Shortness of Air. 18 g 5   • amLODIPine (NORVASC) 10 MG tablet Take 10 mg by mouth daily.     • ferrous sulfate 325 (65 FE) MG tablet Take 325 mg by mouth 3 (Three) Times a Day With  Meals.     • HYDROcodone-acetaminophen (NORCO) 5-325 MG per tablet Take 1 tablet by mouth Every 6 (Six) Hours As Needed for Moderate Pain . 120 tablet 0   • ipratropium-albuterol (DUO-NEB) 0.5-2.5 mg/mL nebulizer inhale contents of 1 vial every 8 hours in nebulizer  0   • lidocaine-prilocaine (EMLA) 2.5-2.5 % cream Apply  topically As Needed for mild pain (1-3) (prior to port access). (Patient taking differently: Apply 1 application topically As Needed for Mild Pain (1-3) (prior to port access).) 30 g 2   • Multiple Vitamin (TAB-A-TESS) tablet Take 1 tablet by mouth daily.  0   • quinapril (ACCUPRIL) 10 MG tablet Take 10 mg by mouth Every Night.         Allergies   Allergen Reactions   • Penicillin V Potassium [Penicillin V] Hives       Past Surgical History:   Procedure Laterality Date   • BREAST BIOPSY     • MASTECTOMY Right 2012   • MASTECTOMY MODIFIED RADICAL W/ AXILLARY LYMPH NODES W/ OR W/O PECTORALIS MINOR     • PORTACATH PLACEMENT         OB History    Para Term  AB Living   5 5 5      SAB TAB Ectopic Molar Multiple Live Births              # Outcome Date GA Lbr Jamar/2nd Weight Sex Delivery Anes PTL Lv   5 Term            4 Term            3 Term            2 Term            1 Term                   Social History     Social History   • Marital status: Single     Social History Main Topics   • Smoking status: Former Smoker   • Smokeless tobacco: Never Used      Comment: quit 8 years    • Alcohol use No   • Drug use: No   • Sexual activity: Defer     Other Topics Concern   • Not on file     Social History Narrative    Lives with grandson. Retired. Uses [employer]        Family History   Problem Relation Age of Onset   • No Known Problems Other    • Heart disease Mother    • Cancer Son    • Breast cancer Neg Hx    • Ovarian cancer Neg Hx        Objective     Vitals:    18 1942 18 0010 18 0408 18 0700   BP: 105/52 108/56 118/61 126/60   Pulse: 91 90 92 93   Resp:     Temp: 98.7 °F (37.1 °C) 98 °F (36.7 °C) 98 °F (36.7 °C) 98.4 °F (36.9 °C)   TempSrc: Oral Oral Oral    SpO2: 94% 96% 96% 100%   Weight:       Height:                ECOG Performance Status: 2 - Symptomatic, <50% confined to bed  General: well appearing female in no acute distress  Neuro/Psych: A&O x 3, gait steady, appropriate affect, strength 5/5 in all muscle groups  HEENT: sclera anicteric, oropharynx clear  Lymphatics: no cervical, supraclavicular, or axillary adenopathy  Cardiovascular: regular rate and rhythm, no murmurs  Lungs: clear to auscultation bilaterally  Abdomen: soft, nontender, nondistended.  No palpable organomegaly  Extremeties: no lower extremity edema  Skin: no rashes, lesions, bruising, or petechiae      Admission on 04/17/2018   Component Date Value Ref Range Status   • WBC 04/18/2018 12.21* 3.50 - 10.80 10*3/mm3 Final   • RBC 04/18/2018 3.47* 3.89 - 5.14 10*6/mm3 Final   • Hemoglobin 04/18/2018 10.0* 11.5 - 15.5 g/dL Final   • Hematocrit 04/18/2018 30.4* 34.5 - 44.0 % Final   • MCV 04/18/2018 87.6  80.0 - 99.0 fL Final   • MCH 04/18/2018 28.8  27.0 - 31.0 pg Final   • MCHC 04/18/2018 32.9  32.0 - 36.0 g/dL Final   • RDW 04/18/2018 19.3* 11.3 - 14.5 % Final   • RDW-SD 04/18/2018 60.3* 37.0 - 54.0 fl Final   • MPV 04/18/2018 10.0  6.0 - 12.0 fL Final   • Platelets 04/18/2018 319  150 - 450 10*3/mm3 Final   • Neutrophil % 04/18/2018 77.0* 41.0 - 71.0 % Final   • Lymphocyte % 04/18/2018 13.7* 24.0 - 44.0 % Final   • Monocyte % 04/18/2018 8.9  0.0 - 12.0 % Final   • Eosinophil % 04/18/2018 0.2  0.0 - 3.0 % Final   • Basophil % 04/18/2018 0.2  0.0 - 1.0 % Final   • Immature Grans % 04/18/2018 0.7* 0.0 - 0.6 % Final   • Neutrophils, Absolute 04/18/2018 9.40* 1.50 - 8.30 10*3/mm3 Final   • Lymphocytes, Absolute 04/18/2018 1.67  0.60 - 4.80 10*3/mm3 Final   • Monocytes, Absolute 04/18/2018 1.09* 0.00 - 1.00 10*3/mm3 Final   • Eosinophils, Absolute 04/18/2018 0.03  0.00 - 0.30 10*3/mm3 Final   •  Basophils, Absolute 04/18/2018 0.02  0.00 - 0.20 10*3/mm3 Final   • Immature Grans, Absolute 04/18/2018 0.09* 0.00 - 0.03 10*3/mm3 Final   • nRBC 04/18/2018 0.0  0.0 - 0.0 /100 WBC Final   • Glucose 04/18/2018 61* 70 - 100 mg/dL Final   • BUN 04/18/2018 30* 9 - 23 mg/dL Final   • Creatinine 04/18/2018 1.00  0.60 - 1.30 mg/dL Final   • Sodium 04/18/2018 139  132 - 146 mmol/L Final   • Potassium 04/18/2018 4.5  3.5 - 5.5 mmol/L Final   • Chloride 04/18/2018 107  99 - 109 mmol/L Final   • CO2 04/18/2018 27.0  20.0 - 31.0 mmol/L Final   • Calcium 04/18/2018 8.1* 8.7 - 10.4 mg/dL Final   • Total Protein 04/18/2018 5.4* 5.7 - 8.2 g/dL Final   • Albumin 04/18/2018 2.20* 3.20 - 4.80 g/dL Final   • ALT (SGPT) 04/18/2018 155* 7 - 40 U/L Final   • AST (SGOT) 04/18/2018 601* 0 - 33 U/L Final   • Alkaline Phosphatase 04/18/2018 743* 25 - 100 U/L Final   • Total Bilirubin 04/18/2018 4.5* 0.3 - 1.2 mg/dL Final   • eGFR   Amer 04/18/2018 65  >60 mL/min/1.73 Final   • Globulin 04/18/2018 3.2  gm/dL Final   • A/G Ratio 04/18/2018 0.7* 1.5 - 2.5 g/dL Final   • BUN/Creatinine Ratio 04/18/2018 30.0* 7.0 - 25.0 Final   • Anion Gap 04/18/2018 5.0  3.0 - 11.0 mmol/L Final   • Bilirubin, Direct 04/18/2018 3.7* 0.0 - 0.2 mg/dL Final   • Procalcitonin 04/18/2018 1.83* <=0.25 ng/mL Final   Infusion on 04/17/2018   Component Date Value Ref Range Status   • Glucose 04/17/2018 152* 70 - 100 mg/dL Final   • BUN 04/17/2018 32* 9 - 23 mg/dL Final   • Creatinine 04/17/2018 1.20  0.60 - 1.30 mg/dL Final   • Sodium 04/17/2018 135  132 - 146 mmol/L Final   • Potassium 04/17/2018 4.4  3.5 - 5.5 mmol/L Final   • Chloride 04/17/2018 100  99 - 109 mmol/L Final   • CO2 04/17/2018 26.0  20.0 - 31.0 mmol/L Final   • Calcium 04/17/2018 8.3* 8.7 - 10.4 mg/dL Final   • Total Protein 04/17/2018 6.2  5.7 - 8.2 g/dL Final   • Albumin 04/17/2018 2.50* 3.20 - 4.80 g/dL Final   • ALT (SGPT) 04/17/2018 165* 7 - 40 U/L Final   • AST (SGOT) 04/17/2018 633* 0 - 33  U/L Final   • Alkaline Phosphatase 04/17/2018 908* 25 - 100 U/L Final   • Total Bilirubin 04/17/2018 4.4* 0.3 - 1.2 mg/dL Final   • eGFR   Amer 04/17/2018 53* >60 mL/min/1.73 Final   • Globulin 04/17/2018 3.7  gm/dL Final   • A/G Ratio 04/17/2018 0.7* 1.5 - 2.5 g/dL Final   • BUN/Creatinine Ratio 04/17/2018 26.7* 7.0 - 25.0 Final   • Anion Gap 04/17/2018 9.0  3.0 - 11.0 mmol/L Final   • TSH 04/17/2018 0.070* 0.350 - 5.350 mIU/mL Final   • Free T4 04/17/2018 1.18  0.89 - 1.76 ng/dL Final   • WBC 04/17/2018 14.30* 3.50 - 10.80 10*3/mm3 Final   • RBC 04/17/2018 4.08  3.89 - 5.14 10*6/mm3 Final   • Hemoglobin 04/17/2018 11.3* 11.5 - 15.5 g/dL Final   • Hematocrit 04/17/2018 36.3  34.5 - 44.0 % Final   • RDW 04/17/2018 20.6* 11.3 - 14.5 % Final   • MCV 04/17/2018 88.9  80.0 - 99.0 fL Final   • MCH 04/17/2018 27.8  27.0 - 31.0 pg Final   • MCHC 04/17/2018 31.3* 32.0 - 36.0 g/dL Final   • MPV 04/17/2018 7.7  6.0 - 12.0 fL Final   • Platelets 04/17/2018 403  150 - 450 10*3/mm3 Final   • Neutrophil % 04/17/2018 84.3* 41.0 - 71.0 % Final   • Lymphocyte % 04/17/2018 12.1* 24.0 - 44.0 % Final   • Monocyte % 04/17/2018 3.6  0.0 - 12.0 % Final   • Neutrophils, Absolute 04/17/2018 12.10* 1.50 - 8.30 10*3/mm3 Final   • Lymphocytes, Absolute 04/17/2018 1.70  0.60 - 4.80 10*3/mm3 Final   • Monocytes, Absolute 04/17/2018 0.50  0.00 - 1.00 10*3/mm3 Final        No results found.    ASSESSMENT 77 years old female with progressive metastatic breast cancer        PLAN  1.  Breast cancer: I reviewed the patient's chart including admission note, blood work results, and ultrasound report.  I explained to the patient that she has rapidly progressive disease with worsening liver enzymes as well as progressive metastases documented on the ultrasound.  Given her advanced stage disease, failing multiple previous last treatment, and poor performance status I recommend best supportive care at this point. The patient would like to discuss  this further with her family.   2.  Dehydration: Continue IV fluid  3.  Elevated liver enzymes: Induced by metastatic disease.  4.  Cancer pain: We'll continue as needed opiates.  Discussed with her family at the bedside.    France Chun MD    4/18/2018

## 2018-04-18 NOTE — PROGRESS NOTES
[facility]  PROGRESS NOTE    Patient Name: Nava Cline  : 1940  MRN: [id number]    Date of Admission: 2018  Length of Stay: 0  Primary Care Physician: Yobani Lima MD    Subjective   Subjective     CC:  FU hypotension    HPI:  Pt reports feeling much better. No complaints. Ate CLD breakfast well.     Review of Systems  Gen- No fevers, chills  CV- No chest pain, palpitations  Resp- No cough, dyspnea  GI- No N/V/D, abd pain    Otherwise ROS is negative except as mentioned in the HPI.    Objective   Objective     Vital Signs:   Temp:  [97.5 °F (36.4 °C)-98.7 °F (37.1 °C)] 97.5 °F (36.4 °C)  Heart Rate:  [88-99] 99  Resp:  [16-20] 16  BP: (105-126)/(52-71) 126/71        Physical Exam:  Constitutional: No acute distress, awake, alert on RA  Eyes: PERRLA, sclerae anicteric, no conjunctival injection  HENT: NCAT, mucous membranes moist  Neck: Supple, no thyromegaly, no lymphadenopathy, trachea midline  Respiratory: Clear to auscultation bilaterally, nonlabored respirations   Cardiovascular: RRR, no murmurs, rubs, or gallops, palpable pedal pulses bilaterally  Gastrointestinal: Positive bowel sounds, soft, nontender, nondistended  Musculoskeletal: No bilateral ankle edema, no clubbing or cyanosis to extremities  Psychiatric: Appropriate affect, cooperative  Neurologic: Oriented x 3, strength symmetric in all extremities, Cranial Nerves grossly intact to confrontation, speech clear  Skin: No rashes    Results Reviewed:  I have personally reviewed current lab, radiology, and data and agree.      Results from last 7 days  Lab Units 18  0448 18  1324   WBC 10*3/mm3 12.21* 14.30*   HEMOGLOBIN g/dL 10.0* 11.3*   HEMATOCRIT % 30.4* 36.3   PLATELETS 10*3/mm3 319 403       Results from last 7 days  Lab Units 18  0448 18  1324   SODIUM mmol/L 139 135   POTASSIUM mmol/L 4.5 4.4   CHLORIDE mmol/L 107 100   CO2 mmol/L 27.0 26.0   BUN mg/dL 30* 32*    CREATININE mg/dL 1.00 1.20   GLUCOSE mg/dL 61* 152*   CALCIUM mg/dL 8.1* 8.3*   ALT (SGPT) U/L 155* 165*   AST (SGOT) U/L 601* 633*     No results found for: BNP  No results found for: PHART    Microbiology Results Abnormal     None          Imaging Results (last 24 hours)     Procedure Component Value Units Date/Time    US Liver [[id number]] Collected:  04/18/18 0941     Updated:  04/18/18 1307    Narrative:       EXAMINATION: US LIVER- 04/18/2018     INDICATION: ?cholangitis      TECHNIQUE: Ultrasound right upper quadrant abdomen     COMPARISON: CT abdomen and pelvis dated 03/27/2018     FINDINGS:      Severely limited evaluation of the pancreas due to overlying bowel gas.  Liver shows redemonstration of diffuse heterogeneous nodular appearance  consistent with diffuse metastatic disease. Largest focal lesion  measuring up to 8 cm within the right hepatic lobe. Small volume trace  perihepatic fluid.     Gallbladder poorly visualized however contains small area of echogenic  focus with shadowing in the region of the gallbladder fossa of  indeterminate significance may represent cholelithiasis in a contracted  gallbladder.  Common bile duct measures 2 mm in diameter however limited in  visualization.     Right kidney measures 11.8 cm in length without evidence of  hydronephrosis, contour deforming mass or obvious calculi containing  multiple simple appearing renal cortical cysts.       Impression:       1. Diffuse hepatic metastatic disease with trace perihepatic free fluid.  2. Cholelithiasis in a contracted gallbladder of poor visualization  without significant pericholecystic fluid.  3. Severely limited evaluation of the common bile duct measuring 2 mm in  normal diameter on visualized segments.      D:  04/18/2018  E:  04/18/2018     This report was finalized on 4/18/2018 1:05 PM by Dr. Jose Elias eHrnandez.       XR Abdomen KUB [[id number]] Collected:  04/18/18 0757     Updated:  04/18/18 1052    Narrative:        EXAMINATION: XR ABDOMEN KUB-      INDICATION: Nausea and vomiting.      COMPARISON: None.     FINDINGS:   1. Air is seen in the stomach and colon without distention.     Multiple loops of small bowel are noted which are air-filled and  nondistended but considered abnormal.     2. Upper abdomen is otherwise clear.       Impression:       1. Mildly gaseous abdomen with multiple small bowel loops which are  air-filled in the lower abdomen and pelvis. These are not distended and  there is no mass or transition zone but certainly is consistent with  either ileus or perhaps a viral enteritis.  2. Evidence of high-grade focal bowel distention, mass, transition zone  or obstruction is otherwise not identified.     D:  04/17/2018  E:  04/18/2018     This report was finalized on 4/18/2018 10:50 AM by Dr. Sergio South MD.           Results for orders placed during the hospital encounter of 08/09/17   Adult Transthoracic Echo Complete    Narrative · Left ventricular systolic function is normal.  · Left ventricular diastolic dysfunction (grade I) consistent with   impaired relaxation.  · Estimated right ventricular systolic pressure from tricuspid   regurgitation is moderately elevated (45-55 mmHg).  · Moderate tricuspid valve regurgitation          I have reviewed the medications.    Assessment/Plan   Assessment / Plan     Hospital Problem List     Nausea & vomiting    Hypotension    Dehydration    Leukocytosis    Diarrhea    Elevated LFTs           Brief Hospital Course to date:  Nava Cline is a 77 y.o. female with metastatic breast cancer to liver who was a direct admit for hypotension from dehydration.     Assessment & Plan:    - Hypotension with ALYX: Baseline Cr 0.7. Resolved with IVF  - Metastatic breast cancer rapidly progressing to liver with transaminitis: Dr. Chun on board. Supportive care. Low suspicion for cholangitis given no fever, Abd US. Palliative care consult tomorrow    DVT Prophylaxis:  pLOV    CODE  STATUS: Full Code    Disposition: I expect the patient to be discharged TBD. ?Hospice/Home/Home hospice    Shama Gonzalez MD  04/18/18  5:11 PM

## 2018-04-19 NOTE — THERAPY EVALUATION
"Acute Care - Physical Therapy Initial Evaluation  [facility]     Patient Name: Nava Cline  : 1940  MRN: [id number]  Today's Date: 2018   Onset of Illness/Injury or Date of Surgery: 18  Date of Referral to PT: 18  Referring Physician: MD Evelio      Admit Date: 2018    Visit Dx:     ICD-10-CM ICD-9-CM   1. Impaired functional mobility, balance, gait, and endurance Z74.09 V49.89     Patient Active Problem List   Diagnosis   • Malignant neoplasm of central portion of right female breast   • Liver metastasis   • Rheumatoid arthritis   • Neutropenia, drug-induced   • Lung nodules   • Nausea & vomiting   • Constipation   • Hypoxia   • Hypotension   • ALYX (acute kidney injury)   • Dehydration   • Leukocytosis   • Diarrhea   • Elevated LFTs     Past Medical History:   Diagnosis Date   • ALYX (acute kidney injury) 2018   • Arthritis    • Breast cancer 2012   • Disease of thyroid gland    • Drug therapy    • Hx of radiation therapy    • Hypertension    • Liver metastasis 2016   • Osteoporosis      Past Surgical History:   Procedure Laterality Date   • BREAST BIOPSY     • MASTECTOMY Right 2012   • MASTECTOMY MODIFIED RADICAL W/ AXILLARY LYMPH NODES W/ OR W/O PECTORALIS MINOR     • PORTACATH PLACEMENT          PT ASSESSMENT (last 12 hours)      Physical Therapy Evaluation     Row Name 18 0830          PT Evaluation Time/Intention    Subjective Information complains of;weakness  -DM     Document Type evaluation  -DM     Mode of Treatment individual therapy;physical therapy  -DM     Patient Effort good  -DM     Symptoms Noted During/After Treatment fatigue  -DM     Comment has amb toBR W/nsgHHA(\"weak;Need Rwx\")  -DM     Row Name 18 0830          General Information    Patient Profile Reviewed? yes  -DM     Onset of Illness/Injury or Date of Surgery 18  -DM     Referring Physician MD Evelio  -DM     Patient Observations alert;cooperative;agree to therapy  -DM "     General Observations of Patient UIC;RA;NOT on tele;req.differentBreak.tray;nsg ordered  -DM     Prior Level of Function independent:;all household mobility;gait;transfer;bed mobility;ADL's;feeding;grooming;dressing;bathing   indepGtSPC,ADL'S;SITSdown inTub;dtr:drives,shops;son:cleans  -DM     Equipment Currently Used at Home cane, quad;cane, straight;grab bar;nebulizer;oxygen;shower chair   usesSPC,not herQC;DOESN't use showChair(sitsDownInTub)  -DM     Pertinent History of Current Functional Problem on Opdiv( Chemo) for R BR.CA;went to OP infus.ctr 4-17;BP 78/51;reported N/V/fatigue x 1 wk, & 10 lb wt loss recently;took Boost 2 days ago, w/resultant diarrhea;adm to [facility] W/ dx of dehy, ALYX, weakness; had US of Liver 4-18 (mets to liver);has lung nods.   -DM     Existing Precautions/Restrictions fall;other (see comments)   per nsg,fall risk;no BP RUE (MAST.)  -DM     Risks Reviewed patient:;LOB;dizziness;increased discomfort;change in vital signs;lines disloged  -DM     Benefits Reviewed patient:;improve function;increase independence;increase strength;increase balance;increase knowledge  -DM     Barriers to Rehab none identified  -DM     Row Name 04/19/18 0830          Relationship/Environment    Primary Source of Support/Comfort child(tonya);extended family  -DM     Lives With child(tonya), adult;grandchild(tonya)   Lives w/gr.son(worksP.T.;Kris);son fromCAstaying w/  -DM     Row Name 04/19/18 0830          Resource/Environmental Concerns    Current Living Arrangements home/apartment/condo  -DM     Resource/Environmental Concerns none  -DM     Transportation Concerns car, none  -DM     Row Name 04/19/18 0830          Cognitive Assessment/Interventions    Additional Documentation Cognitive Assessment/Intervention (Group)  -DM     Row Name 04/19/18 0830          Cognitive Assessment/Intervention- PT/OT    Affect/Mental Status (Cognitive) WNL  -DM     Orientation Status (Cognition) oriented x 4  -DM     Follows  Commands (Cognition) WNL  -DM     Cognitive Function (Cognitive) WNL  -DM     Safety Deficit (Cognitive) safety precautions awareness   per nsg, fall risk (lowBP) but is def. chair alarm use  -DM     Personal Safety Interventions fall prevention program maintained;gait belt;nonskid shoes/slippers when out of bed  -DM     Cognitive Assessment/Intervention Comment pt not following cues for HP w/TRANSF.  -DM     Row Name 04/19/18 0830          Safety Issues, Functional Mobility    Safety Issues Affecting Function (Mobility) safety precaution awareness  -DM     Impairments Affecting Function (Mobility) balance;strength  -DM     Row Name 04/19/18 0830          Bed Mobility Assessment/Treatment    Comment (Bed Mobility) UIC  -DM     Row Name 04/19/18 0830          Transfer Assessment/Treatment    Transfer Assessment/Treatment sit-stand transfer;stand-sit transfer  -DM     Sit-Stand Saxis (Transfers) verbal cues;contact guard  -DM     Stand-Sit Saxis (Transfers) verbal cues;contact guard  -DM     Row Name 04/19/18 0830          Sit-Stand Transfer    Assistive Device (Sit-Stand Transfers) cane, straight  -DM     Row Name 04/19/18 0830          Stand-Sit Transfer    Assistive Device (Stand-Sit Transfers) cane, straight  -DM     Row Name 04/19/18 0830          Gait/Stairs Assessment/Training    Gait/Stairs Assessment/Training gait/ambulation independence;gait/ambulation assistive device;distance ambulated;gait pattern;gait deviations  -DM     Saxis Level (Gait) contact guard  -DM     Assistive Device (Gait) cane, straight   init.Rwx x160 ft,w/ 2 min.sit.rest,thenSPC Lhand X 50 ft  -DM     Distance in Feet (Gait) 210   C/O fat. s/p 2nd gt (w/ SPC);fire alarm sounded;ret. to room  -DM     Pattern (Gait) step-through  -DM     Deviations/Abnormal Patterns (Gait) base of support, narrow;cora decreased  -DM     Bilateral Gait Deviations forward flexed posture;heel strike decreased  -DM     Row Name 04/19/18  0830          General ROM    GENERAL ROM COMMENTS dec. Rshldr 20 % d/tMast.  -DM     Row Name 04/19/18 0830          General Assessment (Manual Muscle Testing)    General Manual Muscle Testing (MMT) Assessment upper extremity strength deficits identified;lower extremity strength deficits identified  -DM     Row Name 04/19/18 0830          Upper Extremity (Manual Muscle Testing)    Comment, MMT: Upper Extremity R shldr grossly 3-/5  -DM     Row Name 04/19/18 0830          Lower Extremity (Manual Muscle Testing)    Comment, MMT: Lower Extremity B hips grossly 4/5 & quads4+/5  -DM     Row Name 04/19/18 0830          Motor Assessment/Intervention    Additional Documentation Balance (Group);Balance Interventions (Group);Therapeutic Exercise (Group);Therapeutic Exercise Interventions (Group)  -DM     Row Name 04/19/18 0830          Therapeutic Exercise    Additional Documentation Therapeutic Exercise (Row)  -DM     Row Name 04/19/18 0830          Therapeutic Exercise    Upper Extremity Range of Motion (Therapeutic Exercise) shoulder flexion/extension, bilateral;shoulder abduction/adduction, bilateral;shoulder horizontal abduction/adduction, bilateral;elbow flexion/extension, bilateral;forearm supination/pronation, bilateral;wrist flexion/extension, bilateral  -DM     Lower Extremity Range of Motion (Therapeutic Exercise) hip flexion/extension, bilateral;hip abduction/adduction, bilateral;hip internal/external rotation, bilateral;knee flexion/extension, bilateral;ankle dorsiflexion/plantar flexion, bilateral   H.slides, LAQ,SAQ,pillow squeezes,nathan.;MIP X 10  -DM     Exercise Type (Therapeutic Exercise) AROM (active range of motion);isometric contraction, static   also neck AROM exer all planes x 10  -DM     Position (Therapeutic Exercise) seated;standing  -DM     Sets/Reps (Therapeutic Exercise) 10  -DM     Row Name 04/19/18 0830          Balance    Balance static sitting balance;static standing balance  -DM     Row Name  04/19/18 0830          Static Sitting Balance    Level of [place] (Unsupported Sitting, Static Balance) independent  -DM     Sitting Position (Unsupported Sitting, Static Balance) sitting on edge of bed  -DM     Time Able to Maintain Position (Unsupported Sitting, Static Balance) 2 to 3 minutes  -DM     Row Name 04/19/18 0830          Static Standing Balance    Level of [place] (Supported Standing, Static Balance) contact guard assist  -DM     Time Able to Maintain Position (Supported Standing, Static Balance) 2 to 3 minutes  -DM     Assistive Device Utilized (Supported Standing, Static Balance) straight cane   R WX forMIP, then SPC  -DM     Comment (Supported Standing, Static Balance) pt.agreeable to switch from Rwx to ownSPC(@BS)  -DM     Row Name 04/19/18 0830          Pain Assessment    Additional Documentation Pain Scale: Numbers Pre/Post-Treatment (Group)  -DM     Row Name 04/19/18 0830          Pain Scale: Numbers Pre/Post-Treatment    Pain Scale: Numbers, Pretreatment 0/10 - no pain  -DM     Pain Scale: Numbers, Post-Treatment 0/10 - no pain  -DM     Row Name 04/19/18 0830          Plan of Care Review    Plan of Care Reviewed With patient  -DM     Row Name 04/19/18 0830          Physical Therapy Clinical Impression    Date of Referral to PT 04/17/18  -DM     PT Diagnosis (PT Clinical Impression) impaired funct mobil  -DM     Criteria for Skilled Interventions Met (PT Clinical Impression) yes;treatment indicated  -DM     Pathology/Pathophysiology Noted (Describe Specifically for Each System) integumentary  -DM     Impairments Found (describe specific impairments) gait, locomotion, and balance  -DM     Rehab Potential (PT Clinical Summary) good, to achieve stated therapy goals  -DM     Care Plan Review (PT) care plan/treatment goals reviewed;patient/other agree to care plan  -DM     Row Name 04/19/18 0830          Vital Signs    Pre Systolic BP Rehab 104  -DM     Pre Treatment Diastolic BP 56  -DM      Pretreatment Heart Rate (beats/min) 95  -DM     Pre SpO2 (%) 92  -DM     O2 Delivery Pre Treatment room air  -DM     Pre Patient Position Sitting  -DM     Intra Patient Position Standing  -DM     Post Patient Position Sitting  -DM     Row Name 04/19/18 0830          Physical Therapy Goals    Bed Mobility Goal Selection (PT) bed mobility, PT goal 1  -DM     Transfer Goal Selection (PT) transfer, PT goal 1  -DM     Gait Training Goal Selection (PT) gait training, PT goal 1  -DM     Row Name 04/19/18 0830          Bed Mobility Goal 1 (PT)    Activity/Assistive Device (Bed Mobility Goal 1, PT) bed mobility activities, all  -DM     Davis Level/Cues Needed (Bed Mobility Goal 1, PT) independent  -DM     Time Frame (Bed Mobility Goal 1, PT) long term goal (LTG);5 days  -DM     Row Name 04/19/18 0830          Transfer Goal 1 (PT)    Activity/Assistive Device (Transfer Goal 1, PT) sit-to-stand/stand-to-sit;bed-to-chair/chair-to-bed;toilet;cane, straight  -DM     Davis Level/Cues Needed (Transfer Goal 1, PT) conditional independence  -DM     Time Frame (Transfer Goal 1, PT) long term goal (LTG);5 days  -DM     Row Name 04/19/18 0830          Gait Training Goal 1 (PT)    Activity/Assistive Device (Gait Training Goal 1, PT) gait (walking locomotion);assistive device use;cane, straight  -DM     Davis Level (Gait Training Goal 1, PT) conditional independence   stable VS  -DM     Distance (Gait Goal 1, PT) 350  -DM     Time Frame (Gait Training Goal 1, PT) long term goal (LTG);5 days  -DM     Row Name 04/19/18 0830          Patient Education Goal (PT)    Activity (Patient Education Goal, PT) HEP exer  -DM     Davis/Cues/Accuracy (Memory Goal 2, PT) demonstrates adequately;verbalizes understanding  -DM     Time Frame (Patient Education Goal, PT) long term goal (LTG);5 days  -DM     Row Name 04/19/18 0830          Positioning and Restraints    Pre-Treatment Position sitting in chair/recliner  -DM      Post Treatment Position chair  -DM     In Chair notified nsg;reclined;call light within reach;encouraged to call for assist;legs elevated   nsg def. chair alarm  -DM     Row Name 04/19/18 0830          Living Environment    Home Accessibility tub/shower is not walk in  -DM       User Key  (r) = Recorded By, (t) = Taken By, (c) = Cosigned By    Initials Name Provider Type    DM Nicolette Ball, ISELA Physical Therapist          Physical Therapy Education     Title: PT OT SLP Therapies (Active)     Topic: Physical Therapy (Active)     Point: Mobility training (Active)    Learning Progress Summary     Learner Status Readiness Method Response Comment Documented by    Patient Active Eager ENMANUEL,D,H NR  DM 04/19/18 1048          Point: Home exercise program (Active)    Learning Progress Summary     Learner Status Readiness Method Response Comment Documented by    Patient Active Eager ENMANUEL,D,H NR  DM 04/19/18 1048          Point: Body mechanics (Active)    Learning Progress Summary     Learner Status Readiness Method Response Comment Documented by    Patient Active [place]lin SINGERD,H NR  DM 04/19/18 1048          Point: Precautions (Active)    Learning Progress Summary     Learner Status Readiness Method Response Comment Documented by    Patient Active Eager E,D,H NR  DM 04/19/18 1048                      User Key     Initials Effective Dates Name Provider Type Discipline    DM 06/19/15 -  Nicolette Ball, PT Physical Therapist PT                PT Recommendation and Plan  Anticipated Discharge Disposition (PT): home or self care (resume OP infusion(chemo))  Planned Therapy Interventions (PT Eval): balance training, bed mobility training, gait training, home exercise program, patient/family education, strengthening, transfer training  Therapy Frequency (PT Clinical Impression): daily  Outcome Summary/Treatment Plan (PT)  Anticipated Discharge Disposition (PT): home or self care (resume OP infusion(chemo))  Plan of Care Reviewed With:  patient  Progress: improving  Outcome Summary: Presents w/ evolving sympt, incl. dehy w/result.decr.BP, decr. Hgb (10.0), elev BUN, desat to 92% on RA, decr. strength/endurance, & impaired funct mobil; ABLE to amb 160 ft w/ R wx,w/ 3 min.sit rest,then 50 ft w/ SPC, then perform HEP exer x 10 w/ freg rests; limited by  fatigue;recommend  resuming OP CHEMO infusion at d/c           Outcome Measures     Row Name 04/19/18 0830             How much help from another person do you currently need...    Turning from your back to your side while in flat bed without using bedrails? 4  -DM      Moving from lying on back to sitting on the side of a flat bed without bedrails? 3  -DM      Moving to and from a bed to a chair (including a wheelchair)? 3  -DM      Standing up from a chair using your arms (e.g., wheelchair, bedside chair)? 3  -DM      Climbing 3-5 steps with a railing? 3  -DM      To walk in hospital room? 3  -DM      AM-PAC 6 Clicks Score 19  -DM         Functional Assessment    Outcome Measure Options AM-PAC 6 Clicks Basic Mobility (PT)  -DM        User Key  (r) = Recorded By, (t) = Taken By, (c) = Cosigned By    Initials Name Provider Type    DM Nicolette Ball, PT Physical Therapist           Time Calculation:         PT Charges     Row Name 04/19/18 1055             Time Calculation    Start Time 0830  -DM      PT Received On 04/19/18  -DM      PT Goal Re-Cert Due Date 04/29/18  -DM         Time Calculation- PT    Total Timed Code Minutes- PT 52 minute(s)  -DM        User Key  (r) = Recorded By, (t) = Taken By, (c) = Cosigned By    Initials Name Provider Type    DONNA Ball, PT Physical Therapist          Therapy Charges for Today     Code Description Service Date Service Provider Modifiers Qty    91485248744 HC PT MOBILITY CURRENT 4/19/2018 Nicolette Ball, PT GP, CJ 1    04296706810 HC PT MOBILITY PROJECTED 4/19/2018 Nicolette Ball, PT GP, CI 1    54819536871 HC GAIT TRAINING EA 15 MIN 4/19/2018 Nicolette  JAY JAY Ball, PT GP 1    25394333755 HC PT EVAL MOD COMPLEXITY 4 4/19/2018 Nicolette Ball, PT GP 1    78366181077 HC PT THER PROC EA 15 MIN 4/19/2018 Nicolette Ball, PT GP 2          PT G-Codes  PT Professional Judgement Used?: Yes  Outcome Measure Options: AM-PAC 6 Clicks Basic Mobility (PT)  Score: 19  Functional Limitation: Mobility: Walking and moving around  Mobility: Walking and Moving Around Current Status (): At least 20 percent but less than 40 percent impaired, limited or restricted  Mobility: Walking and Moving Around Goal Status (): At least 1 percent but less than 20 percent impaired, limited or restricted      Nicolette Ball, PT  4/19/2018

## 2018-04-19 NOTE — PLAN OF CARE
Problem: Skin Injury Risk (Adult)  Goal: Identify Related Risk Factors and Signs and Symptoms  Outcome: Ongoing (interventions implemented as appropriate)   04/19/18 0513   Skin Injury Risk (Adult)   Related Risk Factors (Skin Injury Risk) advanced age;mobility impaired     Goal: Skin Health and Integrity  Outcome: Ongoing (interventions implemented as appropriate)      Problem: Fall Risk (Adult)  Goal: Identify Related Risk Factors and Signs and Symptoms  Outcome: Ongoing (interventions implemented as appropriate)    Goal: Absence of Fall  Outcome: Ongoing (interventions implemented as appropriate)

## 2018-04-19 NOTE — PROGRESS NOTES
"DATE OF VISIT: 4/19/2018    Chief Complain: Followup for metastatic breast cancer     SUBJECTIVE: The patient rested well overnight.  She is feeling better today.  No dizziness.  Her appetite had improved.  She  denied any fever or  chills, no night sweats, denied any headaches    REVIEW OF SYSTEMS: All the other 9 systems are reviewed by me and negative  except what is mentioned in HPI.     PAST MEDICAL HISTORY/SOCIAL HISTORY/FAMILY HISTORY: Unchanged from my prior documentation done on April 18, 2018      Current Facility-Administered Medications:   •  enoxaparin (LOVENOX) syringe 40 mg, 40 mg, Subcutaneous, Q24H, Shama Gonzalez MD, 40 mg at 04/18/18 1805  •  HYDROcodone-acetaminophen (NORCO) 5-325 MG per tablet 1 tablet, 1 tablet, Oral, Q6H PRN, Estefania Nice MD, 1 tablet at 04/18/18 1354  •  ipratropium-albuterol (DUO-NEB) nebulizer solution 1.5 mL, 1.5 mL, Nebulization, Q6H While Awake - RT, LIDIA May, 1.5 mL at 04/19/18 0652  •  multivitamin (THERAGRAN) tablet 1 tablet, 1 tablet, Oral, Daily, LIDIA May, 1 tablet at 04/18/18 1027  •  ondansetron (ZOFRAN) injection 4 mg, 4 mg, Intravenous, Q6H PRN, LIDIA May  •  sodium chloride 0.9 % flush 1-10 mL, 1-10 mL, Intravenous, PRN, LIDIA May    PHYSICAL EXAMINATION:   /57   Pulse 88 Comment: post HR-88  Temp 98 °F (36.7 °C) (Oral)   Resp 24   Ht 162.6 cm (64\")   Wt 64.2 kg (141 lb 8 oz)   SpO2 98%   BMI 24.29 kg/m²    ECOG Performance Status: 2 - Symptomatic, <50% confined to bed  GENERAL: Age appropriate. No acute distress.   NEURO/PSYCH: A&O x 3, strength 5/5 in all muscle groups  HEENT: Head atraumatic, normocephalic.   NECK: Supple. No JVD. No lymphadenopathy.   LUNGS: Clear to auscultation bilaterally. No wheezing. No rhonchi.   HEART: Regular rate and rhythm. S1, S2, no murmurs.   ABDOMEN: Soft, nontender, nondistended. Bowel sounds positive. No  hepatosplenomegaly.   EXTREMITIES: No " clubbing, cyanosis, or edema.   SKIN: No rashes. No purpura.       Admission on 04/17/2018   Component Date Value Ref Range Status   • WBC 04/18/2018 12.21* 3.50 - 10.80 10*3/mm3 Final   • RBC 04/18/2018 3.47* 3.89 - 5.14 10*6/mm3 Final   • Hemoglobin 04/18/2018 10.0* 11.5 - 15.5 g/dL Final   • Hematocrit 04/18/2018 30.4* 34.5 - 44.0 % Final   • MCV 04/18/2018 87.6  80.0 - 99.0 fL Final   • MCH 04/18/2018 28.8  27.0 - 31.0 pg Final   • MCHC 04/18/2018 32.9  32.0 - 36.0 g/dL Final   • RDW 04/18/2018 19.3* 11.3 - 14.5 % Final   • RDW-SD 04/18/2018 60.3* 37.0 - 54.0 fl Final   • MPV 04/18/2018 10.0  6.0 - 12.0 fL Final   • Platelets 04/18/2018 319  150 - 450 10*3/mm3 Final   • Neutrophil % 04/18/2018 77.0* 41.0 - 71.0 % Final   • Lymphocyte % 04/18/2018 13.7* 24.0 - 44.0 % Final   • Monocyte % 04/18/2018 8.9  0.0 - 12.0 % Final   • Eosinophil % 04/18/2018 0.2  0.0 - 3.0 % Final   • Basophil % 04/18/2018 0.2  0.0 - 1.0 % Final   • Immature Grans % 04/18/2018 0.7* 0.0 - 0.6 % Final   • Neutrophils, Absolute 04/18/2018 9.40* 1.50 - 8.30 10*3/mm3 Final   • Lymphocytes, Absolute 04/18/2018 1.67  0.60 - 4.80 10*3/mm3 Final   • Monocytes, Absolute 04/18/2018 1.09* 0.00 - 1.00 10*3/mm3 Final   • Eosinophils, Absolute 04/18/2018 0.03  0.00 - 0.30 10*3/mm3 Final   • Basophils, Absolute 04/18/2018 0.02  0.00 - 0.20 10*3/mm3 Final   • Immature Grans, Absolute 04/18/2018 0.09* 0.00 - 0.03 10*3/mm3 Final   • nRBC 04/18/2018 0.0  0.0 - 0.0 /100 WBC Final   • Glucose 04/18/2018 61* 70 - 100 mg/dL Final   • BUN 04/18/2018 30* 9 - 23 mg/dL Final   • Creatinine 04/18/2018 1.00  0.60 - 1.30 mg/dL Final   • Sodium 04/18/2018 139  132 - 146 mmol/L Final   • Potassium 04/18/2018 4.5  3.5 - 5.5 mmol/L Final   • Chloride 04/18/2018 107  99 - 109 mmol/L Final   • CO2 04/18/2018 27.0  20.0 - 31.0 mmol/L Final   • Calcium 04/18/2018 8.1* 8.7 - 10.4 mg/dL Final   • Total Protein 04/18/2018 5.4* 5.7 - 8.2 g/dL Final   • Albumin 04/18/2018 2.20*  3.20 - 4.80 g/dL Final   • ALT (SGPT) 04/18/2018 155* 7 - 40 U/L Final   • AST (SGOT) 04/18/2018 601* 0 - 33 U/L Final   • Alkaline Phosphatase 04/18/2018 743* 25 - 100 U/L Final   • Total Bilirubin 04/18/2018 4.5* 0.3 - 1.2 mg/dL Final   • eGFR   Amer 04/18/2018 65  >60 mL/min/1.73 Final   • Globulin 04/18/2018 3.2  gm/dL Final   • A/G Ratio 04/18/2018 0.7* 1.5 - 2.5 g/dL Final   • BUN/Creatinine Ratio 04/18/2018 30.0* 7.0 - 25.0 Final   • Anion Gap 04/18/2018 5.0  3.0 - 11.0 mmol/L Final   • Bilirubin, Direct 04/18/2018 3.7* 0.0 - 0.2 mg/dL Final   • Procalcitonin 04/18/2018 1.83* <=0.25 ng/mL Final       No results found.    ASSESSMENT: The patient is a very pleasant 77 y.o. female  with Metastatic breast cancer      PLAN:  1.  Metastatic breast cancer: Patient is agreeable to best supportive care only at this point.  She is not interested in hospice help at this point.  2.  Dehydration: Improved with IV fluid.  3.  Elevated liver enzymes: Secondary to widely metastatic disease.  4.  Cancer related pain: We'll continue as needed opiates.  Patient can be discharged home.  She will follow-up with me in 1 week.      France Chun MD  4/19/2018

## 2018-04-19 NOTE — PROGRESS NOTES
Continued Stay Note  [facility]     Patient Name: Nava Cline  MRN: [id number]  Today's Date: 4/19/2018    Admit Date: 4/17/2018          Discharge Plan     Row Name 04/19/18 0954       Plan    Plan Home with home health    Patient/Family in Agreement with Plan yes    Plan Comments I spoke with patient about home health and she is agreeable for this. After list of agencies given, she selected [facility]. Referral given to Thaddeus Caldera RN. Patient declines walker and tells me she does not want a home aid for bathing.               Discharge Codes    No documentation.       Expected Discharge Date and Time     Expected Discharge Date Expected Discharge Time    Apr 20, 2018             Irasema Mortensen RN

## 2018-04-19 NOTE — PLAN OF CARE
Problem: Patient Care Overview  Goal: Plan of Care Review  Outcome: Ongoing (interventions implemented as appropriate)   04/19/18 1050   Coping/Psychosocial   Plan of Care Reviewed With patient   OTHER   Outcome Summary Presents w/ evolving sympt, incl. dehy w/result.decr.BP, decr. Hgb (10.0), elev BUN, desat to 92% on RA, decr. strength/endurance, & impaired funct mobil; ABLE to amb 160 ft w/ R wx,w/ 3 min.sit rest,then 50 ft w/ SPC, then perform HEP exer x 10 w/ freg rests; limited by fatigue;recommend resuming OP CHEMO infusion at d/c    Plan of Care Review   Progress improving

## 2018-04-20 NOTE — DISCHARGE SUMMARY
[facility] Medicine Services  DISCHARGE SUMMARY    Patient Name: Nava Cline  : 1940  MRN: [id number]    Date of Admission: 2018  Date of Discharge:  2018  Length of Stay: 0  Primary Care Physician: Yobani Lima MD    Consults     Date and Time Order Name Status Description    2018 0030 Hematology & Oncology Inpatient Consult Completed         Hospital Course     Presenting Problem:   Dehydration [E86.0]    Active Hospital Problems (** Indicates Principal Problem)    Diagnosis Date Noted   • **Hypotension [I95.9] 2018   • Dehydration [E86.0] 2018   • Leukocytosis [D72.829] 2018   • Diarrhea [R19.7] 2018   • Elevated LFTs [R79.89] 2018   • Nausea & vomiting [R11.2] 2017      Resolved Hospital Problems    Diagnosis Date Noted Date Resolved   No resolved problems to display.          Hospital Course:  Nava Cline is a 77 y.o. female with a PMH Of Arthritis , breast cancer with radiation and chemo, Mastectomy Hypothyroid, HTN, and  Liver metastasis. Patient was a direct admit from Dr. Chun office. Patient arrived at infusion center she was scheduled to start Opdivo on day of admission. Patient had bp of 78/51. Patient has been unable to keep any food down for at least a week, also had diarrhea.     Hypotension resolved with IVF. Pt was able to maintain hydration on her own prior to discharge. Pt also had cholestatic pattern of transaminitis but without jaundice or signs of cholangitis.        Day of Discharge     HPI:   Eating well. No complaints.     Review of Systems  Gen- No fevers or chills  CV- No chest pain or palpitations  Resp- No cough or dyspnea  GI- No N/V/D or abd pain    Otherwise ROS is negative except as mentioned in the HPI.    Vital Signs:   Temp:  [98 °F (36.7 °C)-98.8 °F (37.1 °C)] 98.8 °F (37.1 °C)  Heart Rate:  [] 99  Resp:  [16-28] 18  BP: (104-116)/(56-57) 104/56     Physical  Exam:  Constitutional: No acute distress, awake, alert on RA  Eyes: PERRLA, sclerae anicteric, no conjunctival injection  HENT: NCAT, mucous membranes moist  Neck: Supple, no thyromegaly, no lymphadenopathy, trachea midline  Respiratory: Clear to auscultation bilaterally, nonlabored respirations   Cardiovascular: RRR, no murmurs, rubs, or gallops, palpable pedal pulses bilaterally  Gastrointestinal: Positive bowel sounds, soft, nontender, nondistended  Musculoskeletal: No bilateral ankle edema, no clubbing or cyanosis to extremities  Psychiatric: Appropriate affect, cooperative  Neurologic: Oriented x 3, strength symmetric in all extremities, Cranial Nerves grossly intact to confrontation, speech clear  Skin: No rashes    Pertinent  and/or Most Recent Results         Results from last 7 days  Lab Units 04/18/18  0448 04/17/18  1324   WBC 10*3/mm3 12.21* 14.30*   HEMOGLOBIN g/dL 10.0* 11.3*   HEMATOCRIT % 30.4* 36.3   PLATELETS 10*3/mm3 319 403   SODIUM mmol/L 139 135   POTASSIUM mmol/L 4.5 4.4   CHLORIDE mmol/L 107 100   CO2 mmol/L 27.0 26.0   BUN mg/dL 30* 32*   CREATININE mg/dL 1.00 1.20   GLUCOSE mg/dL 61* 152*   CALCIUM mg/dL 8.1* 8.3*       Results from last 7 days  Lab Units 04/18/18  0448 04/17/18  1324   BILIRUBIN mg/dL 4.5* 4.4*   ALK PHOS U/L 743* 908*   ALT (SGPT) U/L 155* 165*   AST (SGOT) U/L 601* 633*           Invalid input(s): TG, LDLCALC, LDLREALC    Results from last 7 days  Lab Units 04/17/18  1324   TSH mIU/mL 0.070*     Brief Urine Lab Results  (Last result in the past 365 days)      Color   Clarity   Blood   Leuk Est   Nitrite   Protein   CREAT   Urine HCG        01/16/18 1807 Yellow Cloudy(A) Negative Moderate (2+)(A) Negative 30 mg/dL (1+)(A)               Microbiology Results Abnormal     None          Imaging Results (all)     Procedure Component Value Units Date/Time    US Liver [[id number]] Collected:  04/18/18 0941     Updated:  04/18/18 1307    Narrative:       EXAMINATION: US LIVER-  04/18/2018     INDICATION: ?cholangitis      TECHNIQUE: Ultrasound right upper quadrant abdomen     COMPARISON: CT abdomen and pelvis dated 03/27/2018     FINDINGS:      Severely limited evaluation of the pancreas due to overlying bowel gas.  Liver shows redemonstration of diffuse heterogeneous nodular appearance  consistent with diffuse metastatic disease. Largest focal lesion  measuring up to 8 cm within the right hepatic lobe. Small volume trace  perihepatic fluid.     Gallbladder poorly visualized however contains small area of echogenic  focus with shadowing in the region of the gallbladder fossa of  indeterminate significance may represent cholelithiasis in a contracted  gallbladder.  Common bile duct measures 2 mm in diameter however limited in  visualization.     Right kidney measures 11.8 cm in length without evidence of  hydronephrosis, contour deforming mass or obvious calculi containing  multiple simple appearing renal cortical cysts.       Impression:       1. Diffuse hepatic metastatic disease with trace perihepatic free fluid.  2. Cholelithiasis in a contracted gallbladder of poor visualization  without significant pericholecystic fluid.  3. Severely limited evaluation of the common bile duct measuring 2 mm in  normal diameter on visualized segments.      D:  04/18/2018  E:  04/18/2018     This report was finalized on 4/18/2018 1:05 PM by Dr. Jose Elias Hernandez.       XR Abdomen KUB [[id number]] Collected:  04/18/18 0757     Updated:  04/18/18 1052    Narrative:       EXAMINATION: XR ABDOMEN KUB-      INDICATION: Nausea and vomiting.      COMPARISON: None.     FINDINGS:   1. Air is seen in the stomach and colon without distention.     Multiple loops of small bowel are noted which are air-filled and  nondistended but considered abnormal.     2. Upper abdomen is otherwise clear.       Impression:       1. Mildly gaseous abdomen with multiple small bowel loops which are  air-filled in the lower abdomen and  pelvis. These are not distended and  there is no mass or transition zone but certainly is consistent with  either ileus or perhaps a viral enteritis.  2. Evidence of high-grade focal bowel distention, mass, transition zone  or obstruction is otherwise not identified.     D:  04/17/2018  E:  04/18/2018     This report was finalized on 4/18/2018 10:50 AM by Dr. Sergio South MD.             Results for orders placed during the hospital encounter of 08/09/17   Adult Transthoracic Echo Complete    Narrative · Left ventricular systolic function is normal.  · Left ventricular diastolic dysfunction (grade I) consistent with   impaired relaxation.  · Estimated right ventricular systolic pressure from tricuspid   regurgitation is moderately elevated (45-55 mmHg).  · Moderate tricuspid valve regurgitation            Discharge Details      Nava Cline   Home Medication Instructions CRISTINA:[id number]    Printed on:04/19/18 2217   Medication Information                      albuterol (VENTOLIN HFA) 108 (90 BASE) MCG/ACT inhaler  Inhale 2 puffs Every 6 (Six) Hours As Needed for Wheezing or Shortness of Air.             amLODIPine (NORVASC) 10 MG tablet  Take 10 mg by mouth daily.             ferrous sulfate 325 (65 FE) MG tablet  Take 325 mg by mouth 3 (Three) Times a Day With Meals.             hydrALAZINE (APRESOLINE) 25 MG tablet  Take 25 mg by mouth 2 (Two) Times a Day.             HYDROcodone-acetaminophen (NORCO) 5-325 MG per tablet  Take 1 tablet by mouth Every 6 (Six) Hours As Needed for Moderate Pain .             ipratropium-albuterol (DUO-NEB) 0.5-2.5 mg/mL nebulizer  inhale contents of 1 vial every 8 hours in nebulizer             lidocaine-prilocaine (EMLA) 2.5-2.5 % cream  Apply  topically As Needed for mild pain (1-3) (prior to port access).             Multiple Vitamin (TAB-A-TESS) tablet  Take 1 tablet by mouth daily.             tiotropium bromide monohydrate (SPIRIVA RESPIMAT) 2.5 MCG/ACT aerosol solution  inhaler  Inhale 1 capsule Daily.                   Discharge Disposition:  Home or Self Care    Discharge Diet:  Regular    Discharge Activity:  As tolerated    Future Appointments  Date Time Provider Department Center   4/30/2018 10:00 AM France Chun MD [facility]   5/8/2018 1:00 PM France Chun MD [facility]   5/8/2018 1:30 PM CHAIR 4  [facility]       Additional Instructions for the Follow-ups that You Need to Schedule     Follow up with Dr Chun and for infusions as scheduled.                  Time Spent on Discharge:  >35min    Shama Gonzalez MD  04/19/18  10:17 PM

## 2018-04-23 NOTE — TELEPHONE ENCOUNTER
----- Message from Estrella Avina sent at 4/23/2018  1:16 PM EDT -----  Regarding: BELEM-PHONE CALL  Contact: [phone]  Carin patient's daughter called she wants a phone back to discuss what stage her cancer is?

## 2018-04-30 NOTE — PROGRESS NOTES
DATE OF VISIT: 4/30/2018    REASON FOR VISIT: Followup for   1. Breast cancer:   a) Initially presenting as stage IIB disease, T2N1M0, estrogen receptor  strongly positive, progesterone receptor intermediate, HER-2/jean marie negative.   b) Status post right mastectomy followed by adjuvant chemotherapy using  Taxotere and Cytoxan 4 cycles.   c) Took Arimidex 1 mg p.o. daily from 01/10/2013 until 04/28/2015.   2. Locally relapsed disease with right chest wall as well as axillary lymph  nodes biopsy proven metastatic disease done 04/17/2015.  3. Enrolled on clinical trial [employer]  randomizing patients for 2nd line  hormone treatment with Aromasin with placebo versus Aromasin with Entinostat.   4. Currently on Aromasin single agent.   5. Completed adjuvant radiation treatment by Dr. Benavides to the right chest  wall as well as right axillary lymph nodes 12/09/2015.   6. Currently metastatic disease with new liver metastases documented on CAT  scan 04/22/2016.   7. Started Faslodex Ibrance 05/02/2016.   8. Switched to Carbo/gemzar weekly secondary to progressive disease from 07/22/2016 till 10/11/2016.  9. Started gemzar single agent weekly 2 weeks on and 1 week off 10/25/2016.  10.  Started weekly Adriamycin single agent on February 14, 2017   11. Changed to Doxil single agent every 4 weeks on May 30, 2017.   12.  Progressive disease documented CAT scan done August 9, 2017  13.  Started Halaven in August 21, 2017  14.  Progressive disease documented CAT scan done January 16, 2018  15.  Treatment changed to Abraxane 3 weeks on one week off, started January 30, 2018  16.  Progressive disease documented CAT scan done on March 27, 2018    HISTORY OF PRESENT ILLNESS: The patient is a very pleasant 75-year-old female  with past medical history significant for invasive ductal carcinoma of the  right breast diagnosed 06/21/2012. The patient is status post right mastectomy  on 07/13/2012 with lymph node dissection, tumor greatest  dimension was 2.5 cm,  2 out of 8 positive axial lymph nodes, clear surgical margins. The patient was  started on adjuvant chemotherapy using Taxotere and Cytoxan on 10/08/2012. The  patient completed her treatment on 12/10/2012. The patient was started on  adjuvant hormone treatment using Arimidex 1 mg p.o. daily 01/10/2013. The  patient presented with right chest wall mass. Biopsy done on 04/17/2015  revealed relapsed high-grade ductal carcinoma. She had CAT scan of the chest,  abdomen, and pelvis that revealed disease in the right chest wall as well as  right axilla. The patient was enrolled with ECoG  Aromasin with or without  Entinostat on 05/08/2015. The patient had progressive disease documented on  scans done 04/22/2016 with liver metastases. The patient was started on  Faslodex Ibrance on 05/02/2016. Repeat CT scan done 7/22/2016 showed continued progression of disease, and the patient's treatment was changed to carbo/gemzar 2 weeks on, 1 week off. Completed 5 cycles on 10/11/2016. shw started Gemzar single agent 10/25/2016.  Repeated CAT scan done February 3, 2017 showed progressive liver metastases.  Patient was switched to weekly Adriamycin started on February 14, 2017. She had stable disease documented on CT scans done 5/26/2017 and was switched to Doxil given every 4 weeks.  Repeated scan done August 9, 2017 revealed progressive disease.  Patient was switched to Halaven August 21, 2017.  She completed 6 cycles.  Repeated scans done on January 16, 2018 revealed mixed response with increase in size of liver metastases and right axillary lymph nodes.  She was started on Abraxane weekly January 30, 2018.  Repeated CT scan done on March 27, 2018 revealed progressive disease.  The patient is here today for scheduled follow-up visit.     SUBJECTIVE: The patient is here today with her daughter and niece.  She is complaining of poor appetite.  She is having fatigue.  She denied any nausea or vomiting.  She  is having mild right upper quadrant abdominal pain.    PAST MEDICAL HISTORY/SOCIAL HISTORY/FAMILY HISTORY: Unchanged from my prior documentation done on 10/2/2012.     Review of Systems   Constitutional: Positive for fatigue. Negative for activity change, chills, fever and unexpected weight change.   HENT: Negative for hearing loss, mouth sores, nosebleeds, sore throat and trouble swallowing.    Eyes: Negative for visual disturbance.   Respiratory: Positive for cough. Negative for chest tightness, shortness of breath and wheezing.    Cardiovascular: Negative for chest pain, palpitations and leg swelling.   Gastrointestinal: Positive for abdominal pain and nausea. Negative for abdominal distention, blood in stool, constipation, diarrhea, rectal pain and vomiting.        Poor appetite   Endocrine: Negative for cold intolerance and heat intolerance.   Genitourinary: Negative for difficulty urinating, dysuria, frequency and urgency.   Musculoskeletal: Positive for arthralgias. Negative for back pain, gait problem and myalgias.   Skin: Negative for rash.   Neurological: Positive for weakness. Negative for dizziness, tremors, syncope, light-headedness, numbness and headaches.   Hematological: Negative for adenopathy. Does not bruise/bleed easily.   Psychiatric/Behavioral: Negative for confusion, sleep disturbance and suicidal ideas. The patient is not nervous/anxious.          Current Outpatient Prescriptions:   •  albuterol (VENTOLIN HFA) 108 (90 BASE) MCG/ACT inhaler, Inhale 2 puffs Every 6 (Six) Hours As Needed for Wheezing or Shortness of Air., Disp: 18 g, Rfl: 5  •  amLODIPine (NORVASC) 10 MG tablet, Take 10 mg by mouth daily., Disp: , Rfl:   •  ferrous sulfate 325 (65 FE) MG tablet, Take 325 mg by mouth 3 (Three) Times a Day With Meals., Disp: , Rfl:   •  hydrALAZINE (APRESOLINE) 25 MG tablet, Take 25 mg by mouth 2 (Two) Times a Day., Disp: , Rfl:   •  HYDROcodone-acetaminophen (NORCO) 5-325 MG per tablet, Take 1  "tablet by mouth Every 6 (Six) Hours As Needed for Moderate Pain ., Disp: 120 tablet, Rfl: 0  •  ipratropium-albuterol (DUO-NEB) 0.5-2.5 mg/mL nebulizer, inhale contents of 1 vial every 8 hours in nebulizer, Disp: , Rfl: 0  •  lidocaine-prilocaine (EMLA) 2.5-2.5 % cream, Apply  topically As Needed for mild pain (1-3) (prior to port access). (Patient taking differently: Apply 1 application topically As Needed for Mild Pain (1-3) (prior to port access).), Disp: 30 g, Rfl: 2  •  Multiple Vitamin (TAB-A-TESS) tablet, Take 1 tablet by mouth daily., Disp: , Rfl: 0  •  tiotropium bromide monohydrate (SPIRIVA RESPIMAT) 2.5 MCG/ACT aerosol solution inhaler, Inhale 1 capsule Daily., Disp: , Rfl:     PHYSICAL EXAMINATION:   /78   Pulse 99   Temp 97.5 °F (36.4 °C) (Temporal Artery )   Resp 15   Ht 162.6 cm (64\")   Wt 59.9 kg (132 lb)   BMI 22.66 kg/m²    ECOG Performance Status: 1  General Appearance:  alert, cooperative, no apparent distress and appears stated age   Neurologic/Psychiatric: A&O x 3, gait steady, appropriate affect, strength 5/5 in all muscle groups   HEENT:  Normocephalic, without obvious abnormality, mucous membranes moist   Neck: Supple, symmetrical, trachea midline, no adenopathy;  No thyromegaly, masses, or tenderness   Lungs:   Clear to auscultation bilaterally; respirations regular, even, and unlabored bilaterally   Heart:  Regular rate and rhythm, no murmurs appreciated   Abdomen:   Soft, non-tender, non-distended and no organomegaly   Lymph nodes: No cervical, supraclavicular, inguinal or axillary adenopathy noted   Extremities: Normal, atraumatic; no clubbing, cyanosis, or edema    Skin: No rashes, ulcers, or suspicious lesions noted     Admission on 04/17/2018, Discharged on 04/19/2018   Component Date Value Ref Range Status   • WBC 04/18/2018 12.21* 3.50 - 10.80 10*3/mm3 Final   • RBC 04/18/2018 3.47* 3.89 - 5.14 10*6/mm3 Final   • Hemoglobin 04/18/2018 10.0* 11.5 - 15.5 g/dL Final   • " Hematocrit 04/18/2018 30.4* 34.5 - 44.0 % Final   • MCV 04/18/2018 87.6  80.0 - 99.0 fL Final   • MCH 04/18/2018 28.8  27.0 - 31.0 pg Final   • MCHC 04/18/2018 32.9  32.0 - 36.0 g/dL Final   • RDW 04/18/2018 19.3* 11.3 - 14.5 % Final   • RDW-SD 04/18/2018 60.3* 37.0 - 54.0 fl Final   • MPV 04/18/2018 10.0  6.0 - 12.0 fL Final   • Platelets 04/18/2018 319  150 - 450 10*3/mm3 Final   • Neutrophil % 04/18/2018 77.0* 41.0 - 71.0 % Final   • Lymphocyte % 04/18/2018 13.7* 24.0 - 44.0 % Final   • Monocyte % 04/18/2018 8.9  0.0 - 12.0 % Final   • Eosinophil % 04/18/2018 0.2  0.0 - 3.0 % Final   • Basophil % 04/18/2018 0.2  0.0 - 1.0 % Final   • Immature Grans % 04/18/2018 0.7* 0.0 - 0.6 % Final   • Neutrophils, Absolute 04/18/2018 9.40* 1.50 - 8.30 10*3/mm3 Final   • Lymphocytes, Absolute 04/18/2018 1.67  0.60 - 4.80 10*3/mm3 Final   • Monocytes, Absolute 04/18/2018 1.09* 0.00 - 1.00 10*3/mm3 Final   • Eosinophils, Absolute 04/18/2018 0.03  0.00 - 0.30 10*3/mm3 Final   • Basophils, Absolute 04/18/2018 0.02  0.00 - 0.20 10*3/mm3 Final   • Immature Grans, Absolute 04/18/2018 0.09* 0.00 - 0.03 10*3/mm3 Final   • nRBC 04/18/2018 0.0  0.0 - 0.0 /100 WBC Final   • Glucose 04/18/2018 61* 70 - 100 mg/dL Final   • BUN 04/18/2018 30* 9 - 23 mg/dL Final   • Creatinine 04/18/2018 1.00  0.60 - 1.30 mg/dL Final   • Sodium 04/18/2018 139  132 - 146 mmol/L Final   • Potassium 04/18/2018 4.5  3.5 - 5.5 mmol/L Final   • Chloride 04/18/2018 107  99 - 109 mmol/L Final   • CO2 04/18/2018 27.0  20.0 - 31.0 mmol/L Final   • Calcium 04/18/2018 8.1* 8.7 - 10.4 mg/dL Final   • Total Protein 04/18/2018 5.4* 5.7 - 8.2 g/dL Final   • Albumin 04/18/2018 2.20* 3.20 - 4.80 g/dL Final   • ALT (SGPT) 04/18/2018 155* 7 - 40 U/L Final   • AST (SGOT) 04/18/2018 601* 0 - 33 U/L Final   • Alkaline Phosphatase 04/18/2018 743* 25 - 100 U/L Final   • Total Bilirubin 04/18/2018 4.5* 0.3 - 1.2 mg/dL Final   • eGFR   Amer 04/18/2018 65  >60 mL/min/1.73 Final    • Globulin 04/18/2018 3.2  gm/dL Final   • A/G Ratio 04/18/2018 0.7* 1.5 - 2.5 g/dL Final   • BUN/Creatinine Ratio 04/18/2018 30.0* 7.0 - 25.0 Final   • Anion Gap 04/18/2018 5.0  3.0 - 11.0 mmol/L Final   • Bilirubin, Direct 04/18/2018 3.7* 0.0 - 0.2 mg/dL Final   • Procalcitonin 04/18/2018 1.83* <=0.25 ng/mL Final   Infusion on 04/17/2018   Component Date Value Ref Range Status   • Glucose 04/17/2018 152* 70 - 100 mg/dL Final   • BUN 04/17/2018 32* 9 - 23 mg/dL Final   • Creatinine 04/17/2018 1.20  0.60 - 1.30 mg/dL Final   • Sodium 04/17/2018 135  132 - 146 mmol/L Final   • Potassium 04/17/2018 4.4  3.5 - 5.5 mmol/L Final   • Chloride 04/17/2018 100  99 - 109 mmol/L Final   • CO2 04/17/2018 26.0  20.0 - 31.0 mmol/L Final   • Calcium 04/17/2018 8.3* 8.7 - 10.4 mg/dL Final   • Total Protein 04/17/2018 6.2  5.7 - 8.2 g/dL Final   • Albumin 04/17/2018 2.50* 3.20 - 4.80 g/dL Final   • ALT (SGPT) 04/17/2018 165* 7 - 40 U/L Final   • AST (SGOT) 04/17/2018 633* 0 - 33 U/L Final   • Alkaline Phosphatase 04/17/2018 908* 25 - 100 U/L Final   • Total Bilirubin 04/17/2018 4.4* 0.3 - 1.2 mg/dL Final   • eGFR   Amer 04/17/2018 53* >60 mL/min/1.73 Final   • Globulin 04/17/2018 3.7  gm/dL Final   • A/G Ratio 04/17/2018 0.7* 1.5 - 2.5 g/dL Final   • BUN/Creatinine Ratio 04/17/2018 26.7* 7.0 - 25.0 Final   • Anion Gap 04/17/2018 9.0  3.0 - 11.0 mmol/L Final   • TSH 04/17/2018 0.070* 0.350 - 5.350 mIU/mL Final   • Free T4 04/17/2018 1.18  0.89 - 1.76 ng/dL Final   • WBC 04/17/2018 14.30* 3.50 - 10.80 10*3/mm3 Final   • RBC 04/17/2018 4.08  3.89 - 5.14 10*6/mm3 Final   • Hemoglobin 04/17/2018 11.3* 11.5 - 15.5 g/dL Final   • Hematocrit 04/17/2018 36.3  34.5 - 44.0 % Final   • RDW 04/17/2018 20.6* 11.3 - 14.5 % Final   • MCV 04/17/2018 88.9  80.0 - 99.0 fL Final   • MCH 04/17/2018 27.8  27.0 - 31.0 pg Final   • MCHC 04/17/2018 31.3* 32.0 - 36.0 g/dL Final   • MPV 04/17/2018 7.7  6.0 - 12.0 fL Final   • Platelets 04/17/2018 403   150 - 450 10*3/mm3 Final   • Neutrophil % 04/17/2018 84.3* 41.0 - 71.0 % Final   • Lymphocyte % 04/17/2018 12.1* 24.0 - 44.0 % Final   • Monocyte % 04/17/2018 3.6  0.0 - 12.0 % Final   • Neutrophils, Absolute 04/17/2018 12.10* 1.50 - 8.30 10*3/mm3 Final   • Lymphocytes, Absolute 04/17/2018 1.70  0.60 - 4.80 10*3/mm3 Final   • Monocytes, Absolute 04/17/2018 0.50  0.00 - 1.00 10*3/mm3 Final      Us Liver    Result Date: 4/18/2018  Narrative: EXAMINATION: US LIVER- 04/18/2018  INDICATION: ?cholangitis  TECHNIQUE: Ultrasound right upper quadrant abdomen  COMPARISON: CT abdomen and pelvis dated 03/27/2018  FINDINGS:  Severely limited evaluation of the pancreas due to overlying bowel gas. Liver shows redemonstration of diffuse heterogeneous nodular appearance consistent with diffuse metastatic disease. Largest focal lesion measuring up to 8 cm within the right hepatic lobe. Small volume trace perihepatic fluid.  Gallbladder poorly visualized however contains small area of echogenic focus with shadowing in the region of the gallbladder fossa of indeterminate significance may represent cholelithiasis in a contracted gallbladder. Common bile duct measures 2 mm in diameter however limited in visualization.  Right kidney measures 11.8 cm in length without evidence of hydronephrosis, contour deforming mass or obvious calculi containing multiple simple appearing renal cortical cysts.      Impression: 1. Diffuse hepatic metastatic disease with trace perihepatic free fluid. 2. Cholelithiasis in a contracted gallbladder of poor visualization without significant pericholecystic fluid. 3. Severely limited evaluation of the common bile duct measuring 2 mm in normal diameter on visualized segments.   D:  04/18/2018 E:  04/18/2018  This report was finalized on 4/18/2018 1:05 PM by Dr. Jose Elias Hernandez.      Xr Abdomen Kub    Result Date: 4/18/2018  Narrative: EXAMINATION: XR ABDOMEN KUB-  INDICATION: Nausea and vomiting.  COMPARISON: None.   FINDINGS: 1. Air is seen in the stomach and colon without distention.  Multiple loops of small bowel are noted which are air-filled and nondistended but considered abnormal.  2. Upper abdomen is otherwise clear.      Impression: 1. Mildly gaseous abdomen with multiple small bowel loops which are air-filled in the lower abdomen and pelvis. These are not distended and there is no mass or transition zone but certainly is consistent with either ileus or perhaps a viral enteritis. 2. Evidence of high-grade focal bowel distention, mass, transition zone or obstruction is otherwise not identified.  D:  04/17/2018 E:  04/18/2018  This report was finalized on 4/18/2018 10:50 AM by Dr. Sergio South MD.    (  No results found.    ASSESSMENT: The patient is a very pleasant 75-year-old female with right breast  cancer.     PROBLEM LIST:  1. T2N1M0 invasive ductal carcinoma of the right breast, tumor greatest  dimension 2.5 cm, estrogen receptor strongly positive, progesterone receptor  intermediate, HER-2/jean marie negative by IHC score of 0, 2 out of 8 positive  axillary lymph nodes.   2. Status post right mastectomy with axillary lymph node dissection done by  Dr. Heart on 07/30/2012.   3. Status post 4 cycles of adjuvant chemotherapy using Taxotere and Cytoxan  from 10/08/2012 until 12/10/2012.   4. Took Arimidex 1 mg p.o. daily from 01/10/2013 until 04/28/2015.   5. Relapsed disease documented on CAT scan of the chest, abdomen, and pelvis  done 04/27/2015. Revealed right chest wall mass as well as right axillary  lymphadenopathy. Biopsy done 04/17/2015 revealed invasive ductal carcinoma.   6. Enrolled on ECoG  protocol, Aromasin with placebo versus Aromasin and  Entinostat, 05/08/2015.   7. Stopped Aromasin 04/25/2016 secondary to new liver metastases documented on  CAT scan 04/22/2016.   8. Completed adjuvant radiation treatment to the right chest wall 12/09/2015.  9. Status post right axillary dissection done by Dr. Heart  03/14/2016.   10. Progressive disease documented on CAT scan done 04/22/2016 with new liver  metastases.   11. Started Faslodex Ibrance 05/02/2016.  12.  Worsening metastatic disease documented on CT scans done on 07/25/2016.  13. Started on carboplatin AUC 2 with gemzar given two weeks on, one week off. She is status post three cycle of treatment.   14. Mixed response to treatment documented on CAT scans done 11/15/2016.  15. On gemzar single agent, status post 6 cycles.  16.  Progressive disease documented on CAT scan done February 4, 2017.  17.  Started weekly Adriamycin February 14, 2017.   18. Changed to maintenance Doxil after maximum response from weekly Adriamycin on 5/30/2017.   19. Progressive disease documented on CAT scan done on August 9, 2017  20. Started Halaven August 21, 2017, status post 6 cycles  21.  Progressive disease documented on CAT scan done on January 16, 2018  22.  Treatment was switched to Abraxane weekly that we'll start January 30, 2018  23.  Rheumatoid arthritis  24.  Treatment induced pancytopenia  25.  Chemotherapy-induced nausea    PLAN:  1.  Given her rapidly progressive disease as well as poor performance status I do recommend to focus on symptoms management only.  2.  After long discussion with the patient and her family she is interested in hospice care.  I would be her primary provider through hospice.  3.  The patient will follow-up with me in one month.    4. We will continue Norco 5/325 mg 1 tablet taken every 6 hours as needed for cancer-related pain.    5.  I will continue Zofran as needed for chemotherapy-induced nausea.    France Chun MD,     4/30/2018  10:38 AM

## 2018-05-19 ENCOUNTER — OUTSIDE FACILITY SERVICE (OUTPATIENT)
Dept: HOSPITALIST | Facility: HOSPITAL | Age: 78
End: 2018-05-19